# Patient Record
Sex: MALE | Race: WHITE | NOT HISPANIC OR LATINO | Employment: FULL TIME | URBAN - METROPOLITAN AREA
[De-identification: names, ages, dates, MRNs, and addresses within clinical notes are randomized per-mention and may not be internally consistent; named-entity substitution may affect disease eponyms.]

---

## 2017-01-31 ENCOUNTER — OFFICE VISIT - HEALTHEAST (OUTPATIENT)
Dept: FAMILY MEDICINE | Facility: CLINIC | Age: 37
End: 2017-01-31

## 2017-01-31 DIAGNOSIS — J32.9 SINUSITIS: ICD-10-CM

## 2017-01-31 DIAGNOSIS — H66.91 ROM (RIGHT OTITIS MEDIA): ICD-10-CM

## 2017-02-21 ENCOUNTER — OFFICE VISIT - HEALTHEAST (OUTPATIENT)
Dept: FAMILY MEDICINE | Facility: CLINIC | Age: 37
End: 2017-02-21

## 2017-02-21 DIAGNOSIS — J11.1 INFLUENZA-LIKE SYNDROME: ICD-10-CM

## 2017-07-10 ENCOUNTER — OFFICE VISIT - HEALTHEAST (OUTPATIENT)
Dept: FAMILY MEDICINE | Facility: CLINIC | Age: 37
End: 2017-07-10

## 2017-07-10 DIAGNOSIS — I83.11: ICD-10-CM

## 2017-07-10 DIAGNOSIS — R60.9 CHRONIC EDEMA: ICD-10-CM

## 2017-08-21 ENCOUNTER — OFFICE VISIT - HEALTHEAST (OUTPATIENT)
Dept: FAMILY MEDICINE | Facility: CLINIC | Age: 37
End: 2017-08-21

## 2017-08-21 DIAGNOSIS — J32.9 SINUSITIS: ICD-10-CM

## 2017-08-23 ENCOUNTER — OFFICE VISIT - HEALTHEAST (OUTPATIENT)
Dept: VASCULAR SURGERY | Facility: CLINIC | Age: 37
End: 2017-08-23

## 2017-08-23 DIAGNOSIS — Q66.6 VALGUS DEFORMITY OF BOTH FEET: ICD-10-CM

## 2017-08-23 DIAGNOSIS — I87.303 VENOUS HYPERTENSION OF LOWER EXTREMITY, BILATERAL: ICD-10-CM

## 2017-08-23 DIAGNOSIS — E66.01 MORBID OBESITY WITH BMI OF 40.0-44.9, ADULT (H): ICD-10-CM

## 2017-08-23 DIAGNOSIS — M21.42 ACQUIRED BILATERAL FLAT FEET: ICD-10-CM

## 2017-08-23 DIAGNOSIS — M21.41 ACQUIRED BILATERAL FLAT FEET: ICD-10-CM

## 2017-08-23 ASSESSMENT — MIFFLIN-ST. JEOR: SCORE: 2474.82

## 2017-08-24 ENCOUNTER — COMMUNICATION - HEALTHEAST (OUTPATIENT)
Dept: SURGERY | Facility: CLINIC | Age: 37
End: 2017-08-24

## 2017-10-11 ENCOUNTER — COMMUNICATION - HEALTHEAST (OUTPATIENT)
Dept: SURGERY | Facility: CLINIC | Age: 37
End: 2017-10-11

## 2017-10-23 ENCOUNTER — OFFICE VISIT - HEALTHEAST (OUTPATIENT)
Dept: FAMILY MEDICINE | Facility: CLINIC | Age: 37
End: 2017-10-23

## 2017-10-23 DIAGNOSIS — H66.90 ACUTE OTITIS MEDIA: ICD-10-CM

## 2017-11-30 ENCOUNTER — OFFICE VISIT - HEALTHEAST (OUTPATIENT)
Dept: VASCULAR SURGERY | Facility: CLINIC | Age: 37
End: 2017-11-30

## 2017-11-30 DIAGNOSIS — M21.41 ACQUIRED BILATERAL FLAT FEET: ICD-10-CM

## 2017-11-30 DIAGNOSIS — I87.2 VENOUS INSUFFICIENCY OF BOTH LOWER EXTREMITIES: ICD-10-CM

## 2017-11-30 DIAGNOSIS — M21.42 ACQUIRED BILATERAL FLAT FEET: ICD-10-CM

## 2017-11-30 DIAGNOSIS — R60.9 EDEMA: ICD-10-CM

## 2017-11-30 DIAGNOSIS — R06.83 SNORING: ICD-10-CM

## 2017-11-30 DIAGNOSIS — E66.01 MORBID OBESITY WITH BMI OF 40.0-44.9, ADULT (H): ICD-10-CM

## 2017-11-30 DIAGNOSIS — I87.303 VENOUS HYPERTENSION OF LOWER EXTREMITY, BILATERAL: ICD-10-CM

## 2017-11-30 ASSESSMENT — MIFFLIN-ST. JEOR: SCORE: 2456.68

## 2017-12-01 ENCOUNTER — COMMUNICATION - HEALTHEAST (OUTPATIENT)
Dept: OTHER | Facility: CLINIC | Age: 37
End: 2017-12-01

## 2017-12-05 ENCOUNTER — COMMUNICATION - HEALTHEAST (OUTPATIENT)
Dept: SURGERY | Facility: CLINIC | Age: 37
End: 2017-12-05

## 2017-12-07 ENCOUNTER — COMMUNICATION - HEALTHEAST (OUTPATIENT)
Dept: OTHER | Facility: CLINIC | Age: 37
End: 2017-12-07

## 2018-01-12 ENCOUNTER — AMBULATORY - HEALTHEAST (OUTPATIENT)
Dept: LAB | Facility: CLINIC | Age: 38
End: 2018-01-12

## 2018-01-12 ENCOUNTER — OFFICE VISIT - HEALTHEAST (OUTPATIENT)
Dept: SURGERY | Facility: CLINIC | Age: 38
End: 2018-01-12

## 2018-01-12 DIAGNOSIS — E66.01 OBESITY, CLASS III, BMI 40-49.9 (MORBID OBESITY) (H): ICD-10-CM

## 2018-01-12 LAB
ALBUMIN SERPL-MCNC: 4.1 G/DL (ref 3.5–5)
ALP SERPL-CCNC: 80 U/L (ref 45–120)
ALT SERPL W P-5'-P-CCNC: 66 U/L (ref 0–45)
ANION GAP SERPL CALCULATED.3IONS-SCNC: 12 MMOL/L (ref 5–18)
AST SERPL W P-5'-P-CCNC: 33 U/L (ref 0–40)
BILIRUB SERPL-MCNC: 0.6 MG/DL (ref 0–1)
BUN SERPL-MCNC: 20 MG/DL (ref 8–22)
CALCIUM SERPL-MCNC: 9.7 MG/DL (ref 8.5–10.5)
CHLORIDE BLD-SCNC: 108 MMOL/L (ref 98–107)
CHOLEST SERPL-MCNC: 187 MG/DL
CO2 SERPL-SCNC: 23 MMOL/L (ref 22–31)
CREAT SERPL-MCNC: 0.84 MG/DL (ref 0.7–1.3)
ERYTHROCYTE [DISTWIDTH] IN BLOOD BY AUTOMATED COUNT: 12.1 % (ref 11–14.5)
FASTING STATUS PATIENT QL REPORTED: ABNORMAL
FERRITIN SERPL-MCNC: 236 NG/ML (ref 27–300)
FOLATE SERPL-MCNC: 13.5 NG/ML
GFR SERPL CREATININE-BSD FRML MDRD: >60 ML/MIN/1.73M2
GLUCOSE BLD-MCNC: 98 MG/DL (ref 70–125)
HBA1C MFR BLD: 5.7 % (ref 3.5–6)
HCT VFR BLD AUTO: 45.1 % (ref 40–54)
HDLC SERPL-MCNC: 33 MG/DL
HGB BLD-MCNC: 15.3 G/DL (ref 14–18)
LDLC SERPL CALC-MCNC: 101 MG/DL
MCH RBC QN AUTO: 30.3 PG (ref 27–34)
MCHC RBC AUTO-ENTMCNC: 34 G/DL (ref 32–36)
MCV RBC AUTO: 89 FL (ref 80–100)
PLATELET # BLD AUTO: 266 THOU/UL (ref 140–440)
PMV BLD AUTO: 7.3 FL (ref 7–10)
POTASSIUM BLD-SCNC: 4.2 MMOL/L (ref 3.5–5)
PROT SERPL-MCNC: 7.6 G/DL (ref 6–8)
PTH-INTACT SERPL-MCNC: 37 PG/ML (ref 10–86)
RBC # BLD AUTO: 5.05 MILL/UL (ref 4.4–6.2)
SODIUM SERPL-SCNC: 143 MMOL/L (ref 136–145)
TRIGL SERPL-MCNC: 263 MG/DL
TSH SERPL DL<=0.005 MIU/L-ACNC: 1.77 UIU/ML (ref 0.3–5)
VIT B12 SERPL-MCNC: 505 PG/ML (ref 213–816)
WBC: 9.4 THOU/UL (ref 4–11)

## 2018-01-12 ASSESSMENT — MIFFLIN-ST. JEOR: SCORE: 2497.04

## 2018-01-15 ENCOUNTER — AMBULATORY - HEALTHEAST (OUTPATIENT)
Dept: SURGERY | Facility: CLINIC | Age: 38
End: 2018-01-15

## 2018-01-15 LAB
25(OH)D3 SERPL-MCNC: 24.2 NG/ML (ref 30–80)
ANNOTATION COMMENT IMP: NORMAL
VIT A SERPL-MCNC: 0.7 MG/L (ref 0.3–1.2)
VITAMIN A (RETINYL PALMITATE): 0.06 MG/L (ref 0–0.1)
ZINC SERPL-MCNC: 92 UG/DL (ref 60–120)

## 2018-01-17 ENCOUNTER — COMMUNICATION - HEALTHEAST (OUTPATIENT)
Dept: SURGERY | Facility: CLINIC | Age: 38
End: 2018-01-17

## 2018-01-17 LAB
TESTOST SERPL-MCNC: 145 NG/DL (ref 240–950)
TESTOSTERONE, FREE, S - HISTORICAL: 5.51 NG/DL (ref 4.65–18.1)
VIT B1 PYROPHOSHATE BLD-SCNC: 159 NMOL/L (ref 70–180)

## 2018-01-19 ENCOUNTER — OFFICE VISIT - HEALTHEAST (OUTPATIENT)
Dept: SLEEP MEDICINE | Facility: CLINIC | Age: 38
End: 2018-01-19

## 2018-01-19 DIAGNOSIS — R06.83 SNORING: ICD-10-CM

## 2018-01-19 DIAGNOSIS — R29.818 SUSPECTED SLEEP APNEA: ICD-10-CM

## 2018-01-19 DIAGNOSIS — G47.8 SLEEP DYSFUNCTION WITH SLEEP STAGE DISTURBANCE: ICD-10-CM

## 2018-01-19 DIAGNOSIS — E66.9 OBESITY: ICD-10-CM

## 2018-01-19 ASSESSMENT — MIFFLIN-ST. JEOR: SCORE: 2465.75

## 2018-02-23 ENCOUNTER — RECORDS - HEALTHEAST (OUTPATIENT)
Dept: SLEEP MEDICINE | Facility: CLINIC | Age: 38
End: 2018-02-23

## 2018-02-23 ENCOUNTER — RECORDS - HEALTHEAST (OUTPATIENT)
Dept: ADMINISTRATIVE | Facility: OTHER | Age: 38
End: 2018-02-23

## 2018-02-23 DIAGNOSIS — G47.8 OTHER SLEEP DISORDERS: ICD-10-CM

## 2018-02-23 DIAGNOSIS — R06.83 SNORING: ICD-10-CM

## 2018-02-23 DIAGNOSIS — G47.30 SLEEP APNEA, UNSPECIFIED: ICD-10-CM

## 2018-02-28 ENCOUNTER — OFFICE VISIT - HEALTHEAST (OUTPATIENT)
Dept: FAMILY MEDICINE | Facility: CLINIC | Age: 38
End: 2018-02-28

## 2018-02-28 DIAGNOSIS — J11.1 INFLUENZA-LIKE ILLNESS: ICD-10-CM

## 2018-02-28 DIAGNOSIS — R05.9 COUGH: ICD-10-CM

## 2018-02-28 DIAGNOSIS — R50.9 FEVER: ICD-10-CM

## 2018-02-28 DIAGNOSIS — J02.9 SORE THROAT: ICD-10-CM

## 2018-02-28 LAB
DEPRECATED S PYO AG THROAT QL EIA: NORMAL
FLUAV AG SPEC QL IA: NORMAL
FLUBV AG SPEC QL IA: NORMAL

## 2018-03-01 LAB — GROUP A STREP BY PCR: NORMAL

## 2018-03-02 ENCOUNTER — AMBULATORY - HEALTHEAST (OUTPATIENT)
Dept: SURGERY | Facility: CLINIC | Age: 38
End: 2018-03-02

## 2018-03-06 ENCOUNTER — AMBULATORY - HEALTHEAST (OUTPATIENT)
Dept: SLEEP MEDICINE | Facility: CLINIC | Age: 38
End: 2018-03-06

## 2018-03-06 ENCOUNTER — COMMUNICATION - HEALTHEAST (OUTPATIENT)
Dept: SLEEP MEDICINE | Facility: CLINIC | Age: 38
End: 2018-03-06

## 2018-03-06 DIAGNOSIS — G47.10 HYPERSOMNIA: ICD-10-CM

## 2018-03-06 DIAGNOSIS — G47.33 OBSTRUCTIVE SLEEP APNEA: ICD-10-CM

## 2018-03-09 ENCOUNTER — AMBULATORY - HEALTHEAST (OUTPATIENT)
Dept: SLEEP MEDICINE | Facility: CLINIC | Age: 38
End: 2018-03-09

## 2018-03-12 ENCOUNTER — OFFICE VISIT - HEALTHEAST (OUTPATIENT)
Dept: SURGERY | Facility: CLINIC | Age: 38
End: 2018-03-12

## 2018-03-12 ENCOUNTER — OFFICE VISIT - HEALTHEAST (OUTPATIENT)
Dept: VASCULAR SURGERY | Facility: CLINIC | Age: 38
End: 2018-03-12

## 2018-03-12 DIAGNOSIS — I87.303 VENOUS HYPERTENSION OF LOWER EXTREMITY, BILATERAL: ICD-10-CM

## 2018-03-12 DIAGNOSIS — Q66.6 VALGUS DEFORMITY OF BOTH FEET: ICD-10-CM

## 2018-03-12 DIAGNOSIS — E66.01 OBESITY, MORBID, BMI 40.0-49.9 (H): ICD-10-CM

## 2018-03-12 DIAGNOSIS — Z71.3 DIETARY COUNSELING: ICD-10-CM

## 2018-03-12 DIAGNOSIS — E66.01 MORBID OBESITY WITH BMI OF 40.0-44.9, ADULT (H): ICD-10-CM

## 2018-03-12 DIAGNOSIS — I87.2 VENOUS INSUFFICIENCY OF BOTH LOWER EXTREMITIES: ICD-10-CM

## 2018-03-12 DIAGNOSIS — M21.41 ACQUIRED BILATERAL FLAT FEET: ICD-10-CM

## 2018-03-12 DIAGNOSIS — M21.42 ACQUIRED BILATERAL FLAT FEET: ICD-10-CM

## 2018-03-12 DIAGNOSIS — E66.01 MORBID OBESITY (H): ICD-10-CM

## 2018-03-12 ASSESSMENT — MIFFLIN-ST. JEOR: SCORE: 2382.74

## 2018-03-14 ENCOUNTER — COMMUNICATION - HEALTHEAST (OUTPATIENT)
Dept: SURGERY | Facility: CLINIC | Age: 38
End: 2018-03-14

## 2018-04-23 ENCOUNTER — OFFICE VISIT - HEALTHEAST (OUTPATIENT)
Dept: SLEEP MEDICINE | Facility: CLINIC | Age: 38
End: 2018-04-23

## 2018-04-23 ENCOUNTER — OFFICE VISIT - HEALTHEAST (OUTPATIENT)
Dept: SURGERY | Facility: CLINIC | Age: 38
End: 2018-04-23

## 2018-04-23 DIAGNOSIS — Z71.3 DIETARY COUNSELING: ICD-10-CM

## 2018-04-23 DIAGNOSIS — G47.8 SLEEP DYSFUNCTION WITH SLEEP STAGE DISTURBANCE: ICD-10-CM

## 2018-04-23 DIAGNOSIS — G47.33 OBSTRUCTIVE SLEEP APNEA: ICD-10-CM

## 2018-04-23 DIAGNOSIS — E66.01 MORBID OBESITY (H): ICD-10-CM

## 2018-04-23 DIAGNOSIS — G47.33 OSA ON CPAP: ICD-10-CM

## 2018-04-23 DIAGNOSIS — E66.01 OBESITY, MORBID, BMI 40.0-49.9 (H): ICD-10-CM

## 2018-04-23 ASSESSMENT — MIFFLIN-ST. JEOR
SCORE: 2409.05
SCORE: 2396.35

## 2018-04-24 ENCOUNTER — AMBULATORY - HEALTHEAST (OUTPATIENT)
Dept: SURGERY | Facility: CLINIC | Age: 38
End: 2018-04-24

## 2018-05-07 ENCOUNTER — COMMUNICATION - HEALTHEAST (OUTPATIENT)
Dept: SURGERY | Facility: CLINIC | Age: 38
End: 2018-05-07

## 2018-05-23 ENCOUNTER — OFFICE VISIT - HEALTHEAST (OUTPATIENT)
Dept: SURGERY | Facility: CLINIC | Age: 38
End: 2018-05-23

## 2018-05-23 DIAGNOSIS — Z71.3 DIETARY COUNSELING: ICD-10-CM

## 2018-05-23 DIAGNOSIS — G47.33 OBSTRUCTIVE SLEEP APNEA ON CPAP: ICD-10-CM

## 2018-05-23 DIAGNOSIS — E66.01 OBESITY, MORBID, BMI 40.0-49.9 (H): ICD-10-CM

## 2018-05-23 ASSESSMENT — MIFFLIN-ST. JEOR: SCORE: 2364.59

## 2018-06-11 ENCOUNTER — OFFICE VISIT - HEALTHEAST (OUTPATIENT)
Dept: SURGERY | Facility: CLINIC | Age: 38
End: 2018-06-11

## 2018-06-11 DIAGNOSIS — E66.01 MORBID OBESITY (H): ICD-10-CM

## 2018-06-11 DIAGNOSIS — G47.33 OBSTRUCTIVE SLEEP APNEA: ICD-10-CM

## 2018-06-11 ASSESSMENT — MIFFLIN-ST. JEOR: SCORE: 2384.1

## 2018-06-14 ENCOUNTER — AMBULATORY - HEALTHEAST (OUTPATIENT)
Dept: SURGERY | Facility: CLINIC | Age: 38
End: 2018-06-14

## 2018-06-15 ENCOUNTER — COMMUNICATION - HEALTHEAST (OUTPATIENT)
Dept: SLEEP MEDICINE | Facility: CLINIC | Age: 38
End: 2018-06-15

## 2018-06-26 ENCOUNTER — OFFICE VISIT - HEALTHEAST (OUTPATIENT)
Dept: SLEEP MEDICINE | Facility: CLINIC | Age: 38
End: 2018-06-26

## 2018-06-26 DIAGNOSIS — G47.33 OSA ON CPAP: ICD-10-CM

## 2018-06-26 DIAGNOSIS — G47.8 SLEEP DYSFUNCTION WITH SLEEP STAGE DISTURBANCE: ICD-10-CM

## 2018-06-26 ASSESSMENT — MIFFLIN-ST. JEOR: SCORE: 2364.6

## 2018-06-29 ENCOUNTER — RECORDS - HEALTHEAST (OUTPATIENT)
Dept: ADMINISTRATIVE | Facility: OTHER | Age: 38
End: 2018-06-29

## 2018-07-03 ENCOUNTER — COMMUNICATION - HEALTHEAST (OUTPATIENT)
Dept: FAMILY MEDICINE | Facility: CLINIC | Age: 38
End: 2018-07-03

## 2018-07-05 ENCOUNTER — AMBULATORY - HEALTHEAST (OUTPATIENT)
Dept: SURGERY | Facility: CLINIC | Age: 38
End: 2018-07-05

## 2018-07-05 DIAGNOSIS — Z01.818 PRE-OP TESTING: ICD-10-CM

## 2018-07-09 ENCOUNTER — AMBULATORY - HEALTHEAST (OUTPATIENT)
Dept: SURGERY | Facility: CLINIC | Age: 38
End: 2018-07-09

## 2018-07-09 ENCOUNTER — OFFICE VISIT - HEALTHEAST (OUTPATIENT)
Dept: FAMILY MEDICINE | Facility: CLINIC | Age: 38
End: 2018-07-09

## 2018-07-09 DIAGNOSIS — E66.01 OBESITY, MORBID, BMI 40.0-49.9 (H): ICD-10-CM

## 2018-07-09 DIAGNOSIS — K21.9 ACID REFLUX: ICD-10-CM

## 2018-07-09 DIAGNOSIS — Z98.84 S/P BARIATRIC SURGERY: ICD-10-CM

## 2018-07-09 DIAGNOSIS — Z71.3 DIETARY COUNSELING: ICD-10-CM

## 2018-07-09 DIAGNOSIS — G47.33 OSA (OBSTRUCTIVE SLEEP APNEA): ICD-10-CM

## 2018-07-09 DIAGNOSIS — E66.01 MORBID OBESITY (H): ICD-10-CM

## 2018-07-09 DIAGNOSIS — Z01.818 PRE-OP TESTING: ICD-10-CM

## 2018-07-09 DIAGNOSIS — Z98.84 BARIATRIC SURGERY STATUS: ICD-10-CM

## 2018-07-09 LAB
ALBUMIN SERPL-MCNC: 4.3 G/DL (ref 3.5–5)
ALBUMIN UR-MCNC: NEGATIVE MG/DL
ALP SERPL-CCNC: 79 U/L (ref 45–120)
ALT SERPL W P-5'-P-CCNC: 40 U/L (ref 0–45)
AMORPH CRY #/AREA URNS HPF: ABNORMAL /[HPF]
ANION GAP SERPL CALCULATED.3IONS-SCNC: 8 MMOL/L (ref 5–18)
APPEARANCE UR: ABNORMAL
APTT PPP: 26 SECONDS (ref 24–37)
AST SERPL W P-5'-P-CCNC: 21 U/L (ref 0–40)
ATRIAL RATE - MUSE: 67 BPM
BACTERIA #/AREA URNS HPF: ABNORMAL HPF
BASOPHILS # BLD AUTO: 0.1 THOU/UL (ref 0–0.2)
BASOPHILS NFR BLD AUTO: 1 % (ref 0–2)
BILIRUB SERPL-MCNC: 0.9 MG/DL (ref 0–1)
BILIRUB UR QL STRIP: NEGATIVE
BUN SERPL-MCNC: 11 MG/DL (ref 8–22)
CALCIUM SERPL-MCNC: 9.8 MG/DL (ref 8.5–10.5)
CHLORIDE BLD-SCNC: 109 MMOL/L (ref 98–107)
CO2 SERPL-SCNC: 27 MMOL/L (ref 22–31)
COLOR UR AUTO: YELLOW
CREAT SERPL-MCNC: 0.9 MG/DL (ref 0.7–1.3)
DIASTOLIC BLOOD PRESSURE - MUSE: NORMAL MMHG
EOSINOPHIL # BLD AUTO: 0.3 THOU/UL (ref 0–0.4)
EOSINOPHIL NFR BLD AUTO: 2 % (ref 0–6)
ERYTHROCYTE [DISTWIDTH] IN BLOOD BY AUTOMATED COUNT: 12.2 % (ref 11–14.5)
GFR SERPL CREATININE-BSD FRML MDRD: >60 ML/MIN/1.73M2
GLUCOSE BLD-MCNC: 95 MG/DL (ref 70–125)
GLUCOSE UR STRIP-MCNC: NEGATIVE MG/DL
HCT VFR BLD AUTO: 48.5 % (ref 40–54)
HGB BLD-MCNC: 16.2 G/DL (ref 14–18)
HGB UR QL STRIP: NEGATIVE
INR PPP: 1.05 (ref 0.9–1.1)
INTERPRETATION ECG - MUSE: NORMAL
KETONES UR STRIP-MCNC: NEGATIVE MG/DL
LEUKOCYTE ESTERASE UR QL STRIP: NEGATIVE
LYMPHOCYTES # BLD AUTO: 2.9 THOU/UL (ref 0.8–4.4)
LYMPHOCYTES NFR BLD AUTO: 25 % (ref 20–40)
MCH RBC QN AUTO: 30.5 PG (ref 27–34)
MCHC RBC AUTO-ENTMCNC: 33.5 G/DL (ref 32–36)
MCV RBC AUTO: 91 FL (ref 80–100)
MONOCYTES # BLD AUTO: 0.8 THOU/UL (ref 0–0.9)
MONOCYTES NFR BLD AUTO: 7 % (ref 2–10)
NEUTROPHILS # BLD AUTO: 7.8 THOU/UL (ref 2–7.7)
NEUTROPHILS NFR BLD AUTO: 66 % (ref 50–70)
NITRATE UR QL: NEGATIVE
P AXIS - MUSE: 33 DEGREES
PH UR STRIP: 7.5 [PH] (ref 5–8)
PLATELET # BLD AUTO: 303 THOU/UL (ref 140–440)
PMV BLD AUTO: 7.2 FL (ref 7–10)
POTASSIUM BLD-SCNC: 4.1 MMOL/L (ref 3.5–5)
PR INTERVAL - MUSE: 150 MS
PROT SERPL-MCNC: 7.4 G/DL (ref 6–8)
QRS DURATION - MUSE: 102 MS
QT - MUSE: 404 MS
QTC - MUSE: 426 MS
R AXIS - MUSE: 33 DEGREES
RBC # BLD AUTO: 5.33 MILL/UL (ref 4.4–6.2)
RBC #/AREA URNS AUTO: ABNORMAL HPF
SODIUM SERPL-SCNC: 144 MMOL/L (ref 136–145)
SP GR UR STRIP: 1.01 (ref 1–1.03)
SQUAMOUS #/AREA URNS AUTO: ABNORMAL LPF
SYSTOLIC BLOOD PRESSURE - MUSE: NORMAL MMHG
T AXIS - MUSE: 34 DEGREES
UROBILINOGEN UR STRIP-ACNC: ABNORMAL
VENTRICULAR RATE- MUSE: 67 BPM
WBC #/AREA URNS AUTO: ABNORMAL HPF
WBC: 11.8 THOU/UL (ref 4–11)

## 2018-07-09 ASSESSMENT — MIFFLIN-ST. JEOR
SCORE: 2329.1
SCORE: 2330
SCORE: 2329.1

## 2018-07-12 ENCOUNTER — COMMUNICATION - HEALTHEAST (OUTPATIENT)
Dept: SURGERY | Facility: CLINIC | Age: 38
End: 2018-07-12

## 2018-07-16 ENCOUNTER — COMMUNICATION - HEALTHEAST (OUTPATIENT)
Dept: SURGERY | Facility: CLINIC | Age: 38
End: 2018-07-16

## 2018-08-10 ENCOUNTER — OFFICE VISIT - HEALTHEAST (OUTPATIENT)
Dept: SURGERY | Facility: CLINIC | Age: 38
End: 2018-08-10

## 2018-08-10 DIAGNOSIS — E66.01 OBESITY, CLASS III, BMI 40-49.9 (MORBID OBESITY) (H): ICD-10-CM

## 2018-08-10 DIAGNOSIS — G47.33 OSA ON CPAP: ICD-10-CM

## 2018-08-10 DIAGNOSIS — R63.4 WEIGHT LOSS OF MORE THAN 10% BODY WEIGHT: ICD-10-CM

## 2018-08-10 ASSESSMENT — MIFFLIN-ST. JEOR: SCORE: 2314.13

## 2018-08-23 ENCOUNTER — COMMUNICATION - HEALTHEAST (OUTPATIENT)
Dept: SURGERY | Facility: CLINIC | Age: 38
End: 2018-08-23

## 2018-09-10 ENCOUNTER — OFFICE VISIT - HEALTHEAST (OUTPATIENT)
Dept: SURGERY | Facility: CLINIC | Age: 38
End: 2018-09-10

## 2018-09-10 DIAGNOSIS — E66.01 OBESITY, MORBID, BMI 40.0-49.9 (H): ICD-10-CM

## 2018-09-10 DIAGNOSIS — Z71.3 DIETARY COUNSELING: ICD-10-CM

## 2018-09-10 ASSESSMENT — MIFFLIN-ST. JEOR: SCORE: 2315.49

## 2018-11-27 ENCOUNTER — OFFICE VISIT - HEALTHEAST (OUTPATIENT)
Dept: FAMILY MEDICINE | Facility: CLINIC | Age: 38
End: 2018-11-27

## 2018-11-27 DIAGNOSIS — R68.83 CHILLS (WITHOUT FEVER): ICD-10-CM

## 2018-11-27 DIAGNOSIS — B34.9 ACUTE VIRAL SYNDROME: ICD-10-CM

## 2018-11-27 DIAGNOSIS — R07.0 THROAT PAIN: ICD-10-CM

## 2018-11-27 LAB
DEPRECATED S PYO AG THROAT QL EIA: NORMAL
FLUAV AG SPEC QL IA: NORMAL
FLUBV AG SPEC QL IA: NORMAL

## 2018-11-28 LAB — GROUP A STREP BY PCR: NORMAL

## 2019-01-02 ENCOUNTER — OFFICE VISIT - HEALTHEAST (OUTPATIENT)
Dept: SLEEP MEDICINE | Facility: CLINIC | Age: 39
End: 2019-01-02

## 2019-01-02 DIAGNOSIS — G47.33 OBSTRUCTIVE SLEEP APNEA: ICD-10-CM

## 2019-01-02 DIAGNOSIS — G47.8 SLEEP DYSFUNCTION WITH SLEEP STAGE DISTURBANCE: ICD-10-CM

## 2019-01-02 ASSESSMENT — MIFFLIN-ST. JEOR: SCORE: 2407.12

## 2019-03-18 ENCOUNTER — COMMUNICATION - HEALTHEAST (OUTPATIENT)
Dept: SURGERY | Facility: CLINIC | Age: 39
End: 2019-03-18

## 2019-03-20 ENCOUNTER — AMBULATORY - HEALTHEAST (OUTPATIENT)
Dept: LAB | Facility: CLINIC | Age: 39
End: 2019-03-20

## 2019-03-20 ENCOUNTER — OFFICE VISIT - HEALTHEAST (OUTPATIENT)
Dept: SURGERY | Facility: CLINIC | Age: 39
End: 2019-03-20

## 2019-03-20 DIAGNOSIS — E66.01 OBESITY, CLASS III, BMI 40-49.9 (MORBID OBESITY) (H): ICD-10-CM

## 2019-03-20 DIAGNOSIS — E66.01 MORBID OBESITY (H): ICD-10-CM

## 2019-03-20 DIAGNOSIS — G47.33 OSA ON CPAP: ICD-10-CM

## 2019-03-20 DIAGNOSIS — K76.0 FATTY LIVER DISEASE, NONALCOHOLIC: ICD-10-CM

## 2019-03-20 DIAGNOSIS — G47.33 OBSTRUCTIVE SLEEP APNEA: ICD-10-CM

## 2019-03-20 LAB
ALBUMIN SERPL-MCNC: 4.4 G/DL (ref 3.5–5)
ALP SERPL-CCNC: 68 U/L (ref 45–120)
ALT SERPL W P-5'-P-CCNC: 52 U/L (ref 0–45)
ANION GAP SERPL CALCULATED.3IONS-SCNC: 11 MMOL/L (ref 5–18)
AST SERPL W P-5'-P-CCNC: 27 U/L (ref 0–40)
BILIRUB SERPL-MCNC: 0.8 MG/DL (ref 0–1)
BUN SERPL-MCNC: 17 MG/DL (ref 8–22)
CALCIUM SERPL-MCNC: 9.7 MG/DL (ref 8.5–10.5)
CHLORIDE BLD-SCNC: 105 MMOL/L (ref 98–107)
CHOLEST SERPL-MCNC: 193 MG/DL
CO2 SERPL-SCNC: 26 MMOL/L (ref 22–31)
CREAT SERPL-MCNC: 0.9 MG/DL (ref 0.7–1.3)
ERYTHROCYTE [DISTWIDTH] IN BLOOD BY AUTOMATED COUNT: 11.7 % (ref 11–14.5)
FASTING STATUS PATIENT QL REPORTED: ABNORMAL
FERRITIN SERPL-MCNC: 171 NG/ML (ref 27–300)
FOLATE SERPL-MCNC: >20 NG/ML
GFR SERPL CREATININE-BSD FRML MDRD: >60 ML/MIN/1.73M2
GLUCOSE BLD-MCNC: 86 MG/DL (ref 70–125)
HBA1C MFR BLD: 5 % (ref 3.5–6)
HCT VFR BLD AUTO: 47 % (ref 40–54)
HDLC SERPL-MCNC: 33 MG/DL
HGB BLD-MCNC: 16.1 G/DL (ref 14–18)
LDLC SERPL CALC-MCNC: 99 MG/DL
MCH RBC QN AUTO: 30.9 PG (ref 27–34)
MCHC RBC AUTO-ENTMCNC: 34.2 G/DL (ref 32–36)
MCV RBC AUTO: 90 FL (ref 80–100)
PLATELET # BLD AUTO: 284 THOU/UL (ref 140–440)
PMV BLD AUTO: 7 FL (ref 7–10)
POTASSIUM BLD-SCNC: 4.3 MMOL/L (ref 3.5–5)
PROT SERPL-MCNC: 7.7 G/DL (ref 6–8)
PTH-INTACT SERPL-MCNC: 40 PG/ML (ref 10–86)
RBC # BLD AUTO: 5.2 MILL/UL (ref 4.4–6.2)
SODIUM SERPL-SCNC: 142 MMOL/L (ref 136–145)
TRIGL SERPL-MCNC: 303 MG/DL
TSH SERPL DL<=0.005 MIU/L-ACNC: 1.68 UIU/ML (ref 0.3–5)
VIT B12 SERPL-MCNC: 1530 PG/ML (ref 213–816)
WBC: 10.4 THOU/UL (ref 4–11)

## 2019-03-20 ASSESSMENT — MIFFLIN-ST. JEOR
SCORE: 2442.5
SCORE: 2442.5

## 2019-03-21 LAB — 25(OH)D3 SERPL-MCNC: 32.4 NG/ML (ref 30–80)

## 2019-03-22 LAB — ZINC SERPL-MCNC: 94 UG/DL (ref 60–120)

## 2019-03-23 LAB — VIT B1 PYROPHOSHATE BLD-SCNC: 216 NMOL/L (ref 70–180)

## 2019-03-24 LAB
ANNOTATION COMMENT IMP: ABNORMAL
VIT A SERPL-MCNC: 0.8 MG/L (ref 0.3–1.2)
VITAMIN A (RETINYL PALMITATE): 0.2 MG/L (ref 0–0.1)

## 2019-03-27 ENCOUNTER — OFFICE VISIT - HEALTHEAST (OUTPATIENT)
Dept: VASCULAR SURGERY | Facility: CLINIC | Age: 39
End: 2019-03-27

## 2019-03-27 ENCOUNTER — RECORDS - HEALTHEAST (OUTPATIENT)
Dept: ADMINISTRATIVE | Facility: OTHER | Age: 39
End: 2019-03-27

## 2019-03-27 DIAGNOSIS — Q66.6 VALGUS DEFORMITY OF BOTH FEET: ICD-10-CM

## 2019-03-27 DIAGNOSIS — I87.2 VENOUS INSUFFICIENCY OF BOTH LOWER EXTREMITIES: ICD-10-CM

## 2019-03-27 DIAGNOSIS — M21.41 ACQUIRED BILATERAL FLAT FEET: ICD-10-CM

## 2019-03-27 DIAGNOSIS — M21.42 ACQUIRED BILATERAL FLAT FEET: ICD-10-CM

## 2019-03-27 DIAGNOSIS — I87.303 VENOUS HYPERTENSION OF LOWER EXTREMITY, BILATERAL: ICD-10-CM

## 2019-03-27 DIAGNOSIS — E66.01 MORBID OBESITY WITH BMI OF 40.0-44.9, ADULT (H): ICD-10-CM

## 2019-03-27 ASSESSMENT — MIFFLIN-ST. JEOR: SCORE: 2434

## 2019-04-02 ENCOUNTER — COMMUNICATION - HEALTHEAST (OUTPATIENT)
Dept: SURGERY | Facility: CLINIC | Age: 39
End: 2019-04-02

## 2019-04-04 ENCOUNTER — AMBULATORY - HEALTHEAST (OUTPATIENT)
Dept: SURGERY | Facility: CLINIC | Age: 39
End: 2019-04-04

## 2019-04-08 ENCOUNTER — COMMUNICATION - HEALTHEAST (OUTPATIENT)
Dept: SURGERY | Facility: CLINIC | Age: 39
End: 2019-04-08

## 2019-04-08 ENCOUNTER — COMMUNICATION - HEALTHEAST (OUTPATIENT)
Dept: FAMILY MEDICINE | Facility: CLINIC | Age: 39
End: 2019-04-08

## 2019-04-17 ENCOUNTER — OFFICE VISIT - HEALTHEAST (OUTPATIENT)
Dept: FAMILY MEDICINE | Facility: CLINIC | Age: 39
End: 2019-04-17

## 2019-04-17 DIAGNOSIS — G47.33 OSA (OBSTRUCTIVE SLEEP APNEA): ICD-10-CM

## 2019-04-17 DIAGNOSIS — E66.01 MORBID OBESITY WITH BMI OF 40.0-44.9, ADULT (H): ICD-10-CM

## 2019-04-17 DIAGNOSIS — J30.1 SEASONAL ALLERGIC RHINITIS DUE TO POLLEN: ICD-10-CM

## 2019-04-17 ASSESSMENT — MIFFLIN-ST. JEOR: SCORE: 2406.78

## 2019-04-22 ENCOUNTER — AMBULATORY - HEALTHEAST (OUTPATIENT)
Dept: SURGERY | Facility: CLINIC | Age: 39
End: 2019-04-22

## 2019-04-22 DIAGNOSIS — K21.9 ACID REFLUX: ICD-10-CM

## 2019-04-22 DIAGNOSIS — Z01.818 PRE-OPERATIVE CLEARANCE: ICD-10-CM

## 2019-04-22 DIAGNOSIS — Z98.84 STATUS POST LAPAROSCOPIC SLEEVE GASTRECTOMY: ICD-10-CM

## 2019-05-01 ENCOUNTER — RECORDS - HEALTHEAST (OUTPATIENT)
Dept: GENERAL RADIOLOGY | Facility: CLINIC | Age: 39
End: 2019-05-01

## 2019-05-01 ENCOUNTER — OFFICE VISIT - HEALTHEAST (OUTPATIENT)
Dept: FAMILY MEDICINE | Facility: CLINIC | Age: 39
End: 2019-05-01

## 2019-05-01 DIAGNOSIS — G47.33 OBSTRUCTIVE SLEEP APNEA SYNDROME: ICD-10-CM

## 2019-05-01 DIAGNOSIS — Z01.818 PRE-OPERATIVE CLEARANCE: ICD-10-CM

## 2019-05-01 DIAGNOSIS — Z01.818 ENCOUNTER FOR OTHER PREPROCEDURAL EXAMINATION: ICD-10-CM

## 2019-05-01 DIAGNOSIS — E66.01 MORBID OBESITY WITH BMI OF 40.0-44.9, ADULT (H): ICD-10-CM

## 2019-05-01 LAB
ALBUMIN UR-MCNC: ABNORMAL MG/DL
APPEARANCE UR: CLEAR
BACTERIA #/AREA URNS HPF: ABNORMAL HPF
BILIRUB UR QL STRIP: NEGATIVE
COLOR UR AUTO: YELLOW
GLUCOSE UR STRIP-MCNC: NEGATIVE MG/DL
HGB UR QL STRIP: NEGATIVE
KETONES UR STRIP-MCNC: ABNORMAL MG/DL
LEUKOCYTE ESTERASE UR QL STRIP: NEGATIVE
MUCOUS THREADS #/AREA URNS LPF: ABNORMAL LPF
NITRATE UR QL: NEGATIVE
PH UR STRIP: 5.5 [PH] (ref 5–8)
RBC #/AREA URNS AUTO: ABNORMAL HPF
SP GR UR STRIP: 1.02 (ref 1–1.03)
SQUAMOUS #/AREA URNS AUTO: ABNORMAL LPF
UROBILINOGEN UR STRIP-ACNC: ABNORMAL
WBC #/AREA URNS AUTO: ABNORMAL HPF

## 2019-05-01 ASSESSMENT — MIFFLIN-ST. JEOR: SCORE: 2367.99

## 2019-05-02 LAB
ALBUMIN SERPL-MCNC: 4.7 G/DL (ref 3.5–5)
ALP SERPL-CCNC: 69 U/L (ref 45–120)
ALT SERPL W P-5'-P-CCNC: 51 U/L (ref 0–45)
ANION GAP SERPL CALCULATED.3IONS-SCNC: 13 MMOL/L (ref 5–18)
APTT PPP: 29 SECONDS (ref 24–37)
AST SERPL W P-5'-P-CCNC: 33 U/L (ref 0–40)
ATRIAL RATE - MUSE: 61 BPM
BASOPHILS # BLD AUTO: 0.1 THOU/UL (ref 0–0.2)
BASOPHILS NFR BLD AUTO: 1 % (ref 0–2)
BILIRUB SERPL-MCNC: 1.1 MG/DL (ref 0–1)
BUN SERPL-MCNC: 18 MG/DL (ref 8–22)
CALCIUM SERPL-MCNC: 9.9 MG/DL (ref 8.5–10.5)
CHLORIDE BLD-SCNC: 107 MMOL/L (ref 98–107)
CO2 SERPL-SCNC: 22 MMOL/L (ref 22–31)
CREAT SERPL-MCNC: 0.97 MG/DL (ref 0.7–1.3)
DIASTOLIC BLOOD PRESSURE - MUSE: NORMAL MMHG
EOSINOPHIL # BLD AUTO: 0.3 THOU/UL (ref 0–0.4)
EOSINOPHIL NFR BLD AUTO: 3 % (ref 0–6)
ERYTHROCYTE [DISTWIDTH] IN BLOOD BY AUTOMATED COUNT: 12.6 % (ref 11–14.5)
GFR SERPL CREATININE-BSD FRML MDRD: >60 ML/MIN/1.73M2
GLUCOSE BLD-MCNC: 85 MG/DL (ref 70–125)
HCT VFR BLD AUTO: 46.2 % (ref 40–54)
HGB BLD-MCNC: 15.8 G/DL (ref 14–18)
INR PPP: 1.07 (ref 0.9–1.1)
INTERPRETATION ECG - MUSE: NORMAL
LYMPHOCYTES # BLD AUTO: 2.6 THOU/UL (ref 0.8–4.4)
LYMPHOCYTES NFR BLD AUTO: 26 % (ref 20–40)
MCH RBC QN AUTO: 29.9 PG (ref 27–34)
MCHC RBC AUTO-ENTMCNC: 34.2 G/DL (ref 32–36)
MCV RBC AUTO: 87 FL (ref 80–100)
MONOCYTES # BLD AUTO: 0.7 THOU/UL (ref 0–0.9)
MONOCYTES NFR BLD AUTO: 7 % (ref 2–10)
NEUTROPHILS # BLD AUTO: 6.4 THOU/UL (ref 2–7.7)
NEUTROPHILS NFR BLD AUTO: 64 % (ref 50–70)
P AXIS - MUSE: 8 DEGREES
PLATELET # BLD AUTO: NORMAL THOU/UL (ref 140–440)
PMV BLD AUTO: NORMAL FL (ref 8.5–12.5)
POTASSIUM BLD-SCNC: 4.3 MMOL/L (ref 3.5–5)
PR INTERVAL - MUSE: 150 MS
PROT SERPL-MCNC: 7.8 G/DL (ref 6–8)
QRS DURATION - MUSE: 106 MS
QT - MUSE: 428 MS
QTC - MUSE: 430 MS
R AXIS - MUSE: 17 DEGREES
RBC # BLD AUTO: 5.29 MILL/UL (ref 4.4–6.2)
SODIUM SERPL-SCNC: 142 MMOL/L (ref 136–145)
SYSTOLIC BLOOD PRESSURE - MUSE: NORMAL MMHG
T AXIS - MUSE: 44 DEGREES
VENTRICULAR RATE- MUSE: 61 BPM
WBC: 10.1 THOU/UL (ref 4–11)

## 2019-05-03 ENCOUNTER — COMMUNICATION - HEALTHEAST (OUTPATIENT)
Dept: FAMILY MEDICINE | Facility: CLINIC | Age: 39
End: 2019-05-03

## 2019-05-07 ENCOUNTER — COMMUNICATION - HEALTHEAST (OUTPATIENT)
Dept: SURGERY | Facility: CLINIC | Age: 39
End: 2019-05-07

## 2019-05-10 ENCOUNTER — COMMUNICATION - HEALTHEAST (OUTPATIENT)
Dept: SURGERY | Facility: CLINIC | Age: 39
End: 2019-05-10

## 2019-05-11 ENCOUNTER — ANESTHESIA - HEALTHEAST (OUTPATIENT)
Dept: SURGERY | Facility: CLINIC | Age: 39
End: 2019-05-11

## 2019-05-13 ENCOUNTER — SURGERY - HEALTHEAST (OUTPATIENT)
Dept: SURGERY | Facility: CLINIC | Age: 39
End: 2019-05-13

## 2019-05-13 ASSESSMENT — MIFFLIN-ST. JEOR
SCORE: 2215.13
SCORE: 2212.58
SCORE: 2215.13

## 2019-05-16 ENCOUNTER — COMMUNICATION - HEALTHEAST (OUTPATIENT)
Dept: SURGERY | Facility: CLINIC | Age: 39
End: 2019-05-16

## 2019-05-17 ENCOUNTER — COMMUNICATION - HEALTHEAST (OUTPATIENT)
Dept: SURGERY | Facility: CLINIC | Age: 39
End: 2019-05-17

## 2019-05-20 ENCOUNTER — COMMUNICATION - HEALTHEAST (OUTPATIENT)
Dept: SURGERY | Facility: CLINIC | Age: 39
End: 2019-05-20

## 2019-05-21 ENCOUNTER — COMMUNICATION - HEALTHEAST (OUTPATIENT)
Dept: SURGERY | Facility: CLINIC | Age: 39
End: 2019-05-21

## 2019-05-22 ENCOUNTER — COMMUNICATION - HEALTHEAST (OUTPATIENT)
Dept: SURGERY | Facility: CLINIC | Age: 39
End: 2019-05-22

## 2019-05-22 ENCOUNTER — AMBULATORY - HEALTHEAST (OUTPATIENT)
Dept: SURGERY | Facility: CLINIC | Age: 39
End: 2019-05-22

## 2019-05-22 DIAGNOSIS — Z71.3 DIETARY COUNSELING: ICD-10-CM

## 2019-05-22 DIAGNOSIS — Z98.84 STATUS POST LAPAROSCOPIC SLEEVE GASTRECTOMY: ICD-10-CM

## 2019-05-22 DIAGNOSIS — E66.9 OBESITY (BMI 30-39.9): ICD-10-CM

## 2019-05-29 ENCOUNTER — OFFICE VISIT - HEALTHEAST (OUTPATIENT)
Dept: SURGERY | Facility: CLINIC | Age: 39
End: 2019-05-29

## 2019-05-29 DIAGNOSIS — E66.01 MORBID OBESITY (H): ICD-10-CM

## 2019-05-29 ASSESSMENT — MIFFLIN-ST. JEOR: SCORE: 2224.2

## 2019-06-05 ENCOUNTER — COMMUNICATION - HEALTHEAST (OUTPATIENT)
Dept: SURGERY | Facility: CLINIC | Age: 39
End: 2019-06-05

## 2019-06-20 ENCOUNTER — AMBULATORY - HEALTHEAST (OUTPATIENT)
Dept: SURGERY | Facility: CLINIC | Age: 39
End: 2019-06-20

## 2019-06-20 DIAGNOSIS — Z98.84 BARIATRIC SURGERY STATUS: ICD-10-CM

## 2019-06-20 DIAGNOSIS — Z71.3 NUTRITIONAL COUNSELING: ICD-10-CM

## 2019-06-20 DIAGNOSIS — E66.9 OBESITY WITH BODY MASS INDEX OF 30.0-39.9: ICD-10-CM

## 2019-07-19 ENCOUNTER — COMMUNICATION - HEALTHEAST (OUTPATIENT)
Dept: SURGERY | Facility: CLINIC | Age: 39
End: 2019-07-19

## 2019-08-12 ENCOUNTER — COMMUNICATION - HEALTHEAST (OUTPATIENT)
Dept: SURGERY | Facility: CLINIC | Age: 39
End: 2019-08-12

## 2019-08-12 ENCOUNTER — OFFICE VISIT - HEALTHEAST (OUTPATIENT)
Dept: SURGERY | Facility: CLINIC | Age: 39
End: 2019-08-12

## 2019-08-12 DIAGNOSIS — E66.9 OBESITY (BMI 30-39.9): ICD-10-CM

## 2019-08-12 DIAGNOSIS — Z98.84 S/P BARIATRIC SURGERY: ICD-10-CM

## 2019-08-12 DIAGNOSIS — Z71.3 DIETARY COUNSELING: ICD-10-CM

## 2019-08-12 ASSESSMENT — MIFFLIN-ST. JEOR: SCORE: 2006.48

## 2019-08-13 ENCOUNTER — COMMUNICATION - HEALTHEAST (OUTPATIENT)
Dept: SURGERY | Facility: CLINIC | Age: 39
End: 2019-08-13

## 2019-08-15 ENCOUNTER — COMMUNICATION - HEALTHEAST (OUTPATIENT)
Dept: SURGERY | Facility: CLINIC | Age: 39
End: 2019-08-15

## 2019-11-13 ENCOUNTER — COMMUNICATION - HEALTHEAST (OUTPATIENT)
Dept: SURGERY | Facility: CLINIC | Age: 39
End: 2019-11-13

## 2019-11-13 ENCOUNTER — AMBULATORY - HEALTHEAST (OUTPATIENT)
Dept: LAB | Facility: CLINIC | Age: 39
End: 2019-11-13

## 2019-11-13 ENCOUNTER — OFFICE VISIT - HEALTHEAST (OUTPATIENT)
Dept: SURGERY | Facility: CLINIC | Age: 39
End: 2019-11-13

## 2019-11-13 DIAGNOSIS — K91.2 POSTOPERATIVE MALABSORPTION: ICD-10-CM

## 2019-11-13 DIAGNOSIS — Z90.3 HISTORY OF SLEEVE GASTRECTOMY: ICD-10-CM

## 2019-11-13 DIAGNOSIS — Z86.69 HISTORY OF SLEEP APNEA: ICD-10-CM

## 2019-11-13 LAB
ALBUMIN SERPL-MCNC: 4.2 G/DL (ref 3.5–5)
ALP SERPL-CCNC: 80 U/L (ref 45–120)
ALT SERPL W P-5'-P-CCNC: 17 U/L (ref 0–45)
ANION GAP SERPL CALCULATED.3IONS-SCNC: 8 MMOL/L (ref 5–18)
AST SERPL W P-5'-P-CCNC: 17 U/L (ref 0–40)
BILIRUB SERPL-MCNC: 1.2 MG/DL (ref 0–1)
BUN SERPL-MCNC: 17 MG/DL (ref 8–22)
CALCIUM SERPL-MCNC: 9.5 MG/DL (ref 8.5–10.5)
CHLORIDE BLD-SCNC: 108 MMOL/L (ref 98–107)
CO2 SERPL-SCNC: 27 MMOL/L (ref 22–31)
CREAT SERPL-MCNC: 0.78 MG/DL (ref 0.7–1.3)
ERYTHROCYTE [DISTWIDTH] IN BLOOD BY AUTOMATED COUNT: 11.1 % (ref 11–14.5)
FERRITIN SERPL-MCNC: 155 NG/ML (ref 27–300)
GFR SERPL CREATININE-BSD FRML MDRD: >60 ML/MIN/1.73M2
GLUCOSE BLD-MCNC: 93 MG/DL (ref 70–125)
HBA1C MFR BLD: 4.7 % (ref 3.5–6)
HCT VFR BLD AUTO: 43.3 % (ref 40–54)
HGB BLD-MCNC: 14.6 G/DL (ref 14–18)
MAGNESIUM SERPL-MCNC: 2 MG/DL (ref 1.8–2.6)
MCH RBC QN AUTO: 31.5 PG (ref 27–34)
MCHC RBC AUTO-ENTMCNC: 33.6 G/DL (ref 32–36)
MCV RBC AUTO: 94 FL (ref 80–100)
PLATELET # BLD AUTO: 260 THOU/UL (ref 140–440)
PMV BLD AUTO: 7 FL (ref 7–10)
POTASSIUM BLD-SCNC: 5.2 MMOL/L (ref 3.5–5)
PROT SERPL-MCNC: 6.7 G/DL (ref 6–8)
PTH-INTACT SERPL-MCNC: 55 PG/ML (ref 10–86)
RBC # BLD AUTO: 4.63 MILL/UL (ref 4.4–6.2)
SODIUM SERPL-SCNC: 143 MMOL/L (ref 136–145)
VIT B12 SERPL-MCNC: >2000 PG/ML (ref 213–816)
WBC: 5.7 THOU/UL (ref 4–11)

## 2019-11-13 RX ORDER — IBUPROFEN 200 MG
1 CAPSULE ORAL DAILY
Status: SHIPPED | COMMUNITY
Start: 2019-11-13 | End: 2023-05-16

## 2019-11-13 ASSESSMENT — MIFFLIN-ST. JEOR: SCORE: 1910.31

## 2019-11-14 LAB — 25(OH)D3 SERPL-MCNC: 73.3 NG/ML (ref 30–80)

## 2019-11-16 LAB
ANNOTATION COMMENT IMP: NORMAL
COPPER SERPL-MCNC: 98 UG/DL (ref 70–140)
VIT A SERPL-MCNC: 0.46 MG/L (ref 0.3–1.2)
VIT B1 PYROPHOSHATE BLD-SCNC: 166 NMOL/L (ref 70–180)
VITAMIN A (RETINYL PALMITATE): 0.04 MG/L (ref 0–0.1)
ZINC SERPL-MCNC: 77.1 UG/DL (ref 60–120)

## 2019-11-18 ENCOUNTER — COMMUNICATION - HEALTHEAST (OUTPATIENT)
Dept: SURGERY | Facility: CLINIC | Age: 39
End: 2019-11-18

## 2019-12-11 ENCOUNTER — COMMUNICATION - HEALTHEAST (OUTPATIENT)
Dept: OTHER | Facility: CLINIC | Age: 39
End: 2019-12-11

## 2019-12-11 ENCOUNTER — OFFICE VISIT - HEALTHEAST (OUTPATIENT)
Dept: VASCULAR SURGERY | Facility: CLINIC | Age: 39
End: 2019-12-11

## 2019-12-11 DIAGNOSIS — I87.2 VENOUS INSUFFICIENCY OF BOTH LOWER EXTREMITIES: ICD-10-CM

## 2019-12-11 DIAGNOSIS — Q66.6 VALGUS DEFORMITY OF BOTH FEET: ICD-10-CM

## 2019-12-11 DIAGNOSIS — M21.41 FLAT FEET, BILATERAL: ICD-10-CM

## 2019-12-11 DIAGNOSIS — I87.303 VENOUS HYPERTENSION OF LOWER EXTREMITY, BILATERAL: ICD-10-CM

## 2019-12-11 DIAGNOSIS — M21.42 FLAT FEET, BILATERAL: ICD-10-CM

## 2019-12-11 ASSESSMENT — MIFFLIN-ST. JEOR: SCORE: 1893.08

## 2020-02-17 ENCOUNTER — COMMUNICATION - HEALTHEAST (OUTPATIENT)
Dept: SURGERY | Facility: CLINIC | Age: 40
End: 2020-02-17

## 2020-09-24 ENCOUNTER — AMBULATORY - HEALTHEAST (OUTPATIENT)
Dept: INTERNAL MEDICINE | Facility: CLINIC | Age: 40
End: 2020-09-24

## 2020-09-24 ENCOUNTER — VIRTUAL VISIT (OUTPATIENT)
Dept: FAMILY MEDICINE | Facility: OTHER | Age: 40
End: 2020-09-24
Payer: COMMERCIAL

## 2020-09-24 DIAGNOSIS — Z20.822 SUSPECTED 2019 NOVEL CORONAVIRUS INFECTION: ICD-10-CM

## 2020-09-24 PROCEDURE — 99421 OL DIG E/M SVC 5-10 MIN: CPT | Performed by: PHYSICIAN ASSISTANT

## 2020-09-24 NOTE — PROGRESS NOTES
"Date: 2020 10:35:22  Clinician: Lester Dyson  Clinician NPI: [id number]  Patient: Lawson Daniels  Patient : 1980  Patient Address: [address] [place] [address]  Patient Phone: [phone]  Visit Protocol: URI  Patient Summary:  Lawson is a 40 year old ( : 1980 ) male who initiated a [facility] Visit for COVID-19 (Coronavirus) evaluation and screening. When asked the question \"Please sign me up to receive news, health information and promotions from [facility].\", Lawson responded \"Yes\".    Lawson states his symptoms started 1-2 days ago.   His symptoms consist of nasal congestion, a headache, nausea, facial pain or pressure, myalgia, chills, and malaise.   Symptom details     Nasal secretions: The color of his mucus is green.    Facial pain or pressure: The facial pain or pressure feels worse when bending over or leaning forward.     Headache: He states the headache is severe (7-9 on a 10 point pain scale).      Lawson denies having cough, ear pain, anosmia, vomiting, rhinitis, wheezing, enlarged lymph nodes, fever, sore throat, teeth pain, ageusia, and diarrhea. He also denies taking antibiotic medication in the past month and having recent facial or sinus surgery in the past 60 days. He is not experiencing dyspnea.   Precipitating events  He has not recently been exposed to someone with influenza. Lawson has not been in close contact with any high risk individuals.   Pertinent COVID-19 (Coronavirus) information  In the past 14 days, Lawson has not worked in a congregate living setting.   He does not work or volunteer as healthcare worker or a  and does not work or volunteer in a healthcare facility.   Lawson also has not lived in a congregate living setting in the past 14 days. He lives with a healthcare worker.   Lawson has not had a close contact with a laboratory-confirmed COVID-19 patient within 14 days of symptom onset.   Since 2019, Lawson " and has had upper respiratory infection (URI) or influenza-like illness. Has not been diagnosed with lab-confirmed COVID-19 test      Date(s) of previous URI or influenza-like illness (free-text): December 2019 - January 2020     Symptoms Lawson experienced during previous URI or influenza-like illness as reported by the patient (free-text): Headache, chills, weakness, continuously tired, sore throat, coughing, chills        Pertinent medical history  Lawson had 1 sinus infection within the past year.   Lawson does not need a return to work/school note.   Weight: 209 lbs   Lawson does not smoke or use smokeless tobacco.   Additional information as reported by the patient (free text): 3/17/19 had bariatric surgery.   Weight: 209 lbs    MEDICATIONS: Allegra Allergy oral, acetaminophen-DM oral, ALLERGIES: NKDA  Clinician Response:  Dear Lawson,   Your symptoms show that you may have coronavirus (COVID-19). This illness can cause fever, cough and trouble breathing. Many people get a mild case and get better on their own. Some people can get very sick.  What should I do?  We would like to test you for this virus.   1. Please call [phone] to schedule your visit. Explain that you were referred by [facility] to have a COVID-19 test. Be ready to share your [facility] visit ID number.  The following will serve as your written order for this COVID Test, ordered by me, for the indication of suspected COVID [Z20.828]: The test will be ordered in [employer], our electronic health record, after you are scheduled. It will show as ordered and authorized by José Antonio Yan MD.  Order: COVID-19 (Coronavirus) PCR for SYMPTOMATIC testing from [facility].      2. When it's time for your COVID test:  Stay at least 6 feet away from others. (If someone will drive you to your test, stay in the backseat, as far away from the  as you can.)   Cover your mouth and nose with a mask, tissue or washcloth.  Go straight to the testing site. Don't make any  "stops on the way there or back.      3.Starting now: Stay home and away from others (self-isolate) until:   You've had no fever---and no medicine that reduces fever---for one full day (24 hours). And...   Your other symptoms have gotten better. For example, your cough or breathing has improved. And...   At least 10 days have passed since your symptoms started.       During this time, don't leave the house except for testing or medical care.   Stay in your own room, even for meals. Use your own bathroom if you can.   Stay away from others in your home. No hugging, kissing or shaking hands. No visitors.  Don't go to work, school or anywhere else.    Clean \"high touch\" surfaces often (doorknobs, counters, handles, etc.). Use a household cleaning spray or wipes. You'll find a full list of  on the EPA website: www.epa.gov/pesticide-registration/list-n-disinfectants-use-against-sars-cov-2.   Cover your mouth and nose with a mask, tissue or washcloth to avoid spreading germs.  Wash your hands and face often. Use soap and water.  Caregivers in these groups are at risk for severe illness due to COVID-19:  o People 65 years and older  o People who live in a nursing home or long-term care facility  o People with chronic disease (lung, heart, cancer, diabetes, kidney, liver, immunologic)  o People who have a weakened immune system, including those who:   Are in cancer treatment  Take medicine that weakens the immune system, such as corticosteroids  Had a bone marrow or organ transplant  Have an immune deficiency  Have poorly controlled HIV or AIDS  Are obese (body mass index of 40 or higher)  Smoke regularly   o Caregivers should wear gloves while washing dishes, handling laundry and cleaning bedrooms and bathrooms.  o Use caution when washing and drying laundry: Don't shake dirty laundry, and use the warmest water setting that you can.  o For more tips, go to www.cdc.gov/coronavirus/2019-ncov/downloads/10Things.pdf.    " How can I take care of myself?    Get lots of rest. Drink extra fluids (unless a doctor has told you not to).   Take Tylenol (acetaminophen) for fever or pain. If you have liver or kidney problems, ask your family doctor if it's okay to take Tylenol.   Adults can take either:    650 mg (two 325 mg pills) every 4 to 6 hours, or...   1,000 mg (two 500 mg pills) every 8 hours as needed.    Note: Don't take more than 3,000 mg in one day. Acetaminophen is found in many medicines (both prescribed and over-the-counter medicines). Read all labels to be sure you don't take too much.   For children, check the Tylenol bottle for the right dose. The dose is based on the child's age or weight.    If you have other health problems (like cancer, heart failure, an organ transplant or severe kidney disease): Call your specialty clinic if you don't feel better in the next 2 days.       Know when to call 911. Emergency warning signs include:    Trouble breathing or shortness of breath Pain or pressure in the chest that doesn't go away Feeling confused like you haven't felt before, or not being able to wake up Bluish-colored lips or face.  Where can I get more information?   [facility] -- About COVID-19: [email or web address]   CDC -- What to Do If You're Sick: www.cdc.gov/coronavirus/2019-ncov/about/steps-when-sick.html   CDC -- Ending Home Isolation: www.cdc.gov/coronavirus/2019-ncov/hcp/disposition-in-home-patients.html   CDC -- Caring for Someone: www.cdc.gov/coronavirus/2019-ncov/if-you-are-sick/care-for-someone.html   [facility] -- Interim Guidance for Hospital Discharge to Home: www.health.[facility].[place]/diseases/coronavirus/hcp/hospdischarge.pdf   [facility] clinical trials (COVID-19 research studies): clinicalaffairs.[email or web address]/umn-clinical-trials    Below are the COVID-19 hotlines at the [place] Department of Health ([facility]). Interpreters are available.    For health questions: Call 711-436-9682 or  1-624.987.3499 (7 a.m. to 7 p.m.) For questions about schools and childcare: Call 412-386-5160 or 1-486.234.1348 (7 a.m. to 7 p.m.)    Diagnosis: Nasal congestion  Diagnosis ICD: R09.81

## 2020-09-25 ENCOUNTER — AMBULATORY - HEALTHEAST (OUTPATIENT)
Dept: FAMILY MEDICINE | Facility: CLINIC | Age: 40
End: 2020-09-25

## 2020-09-25 DIAGNOSIS — Z20.822 SUSPECTED 2019 NOVEL CORONAVIRUS INFECTION: ICD-10-CM

## 2020-09-27 ENCOUNTER — COMMUNICATION - HEALTHEAST (OUTPATIENT)
Dept: SCHEDULING | Facility: CLINIC | Age: 40
End: 2020-09-27

## 2020-10-05 ENCOUNTER — AMBULATORY - HEALTHEAST (OUTPATIENT)
Dept: NURSING | Facility: CLINIC | Age: 40
End: 2020-10-05

## 2021-01-15 ENCOUNTER — HEALTH MAINTENANCE LETTER (OUTPATIENT)
Age: 41
End: 2021-01-15

## 2021-04-10 ENCOUNTER — IMMUNIZATION (OUTPATIENT)
Dept: NURSING | Facility: CLINIC | Age: 41
End: 2021-04-10
Payer: COMMERCIAL

## 2021-04-10 PROCEDURE — 91301 PR COVID VAC MODERNA 100 MCG/0.5 ML IM: CPT

## 2021-04-10 PROCEDURE — 0011A PR COVID VAC MODERNA 100 MCG/0.5 ML IM: CPT

## 2021-05-08 ENCOUNTER — IMMUNIZATION (OUTPATIENT)
Dept: NURSING | Facility: CLINIC | Age: 41
End: 2021-05-08
Attending: INTERNAL MEDICINE
Payer: COMMERCIAL

## 2021-05-08 PROCEDURE — 0012A PR COVID VAC MODERNA 100 MCG/0.5 ML IM: CPT

## 2021-05-08 PROCEDURE — 91301 PR COVID VAC MODERNA 100 MCG/0.5 ML IM: CPT

## 2021-05-27 NOTE — PROGRESS NOTES
ASSESSMENT/PLAN:       1. Seasonal allergic rhinitis due to pollen  The patient will continue on his Allegra    2. URIEL (obstructive sleep apnea)  Continue to use CPAP even if staying over in the hospital overnight.      3. Morbid obesity with BMI of 40.0-44.9, adult (H)  Will see the patient back on May 1 for preop exam.  Answered his questions about the procedure and expectations.  I think it is good that he has stopped the naltrexone.  He has omeprazole on hand is he may have some increased heartburn after his surgery.  His blood pressure initially today was elevated but on the second check it was okay.  He is not had a history of diabetes.  He has had some peripheral edema which is likely related to his weight.    25 minutes spent total with the patient with greater than 50% of that time being spent in counseling in regard to his upcoming preop evaluation and his upcoming surgery.    Dawood Mondragon MD      PROGRESS NOTE   4/17/2019    SUBJECTIVE:  Lawson Daniels is a 39 y.o. male  who presents for   Chief Complaint   Patient presents with     Establish Care     pt having surgery in 1 1/2 wanting to establish care     1. Seasonal allergic rhinitis due to pollen  The patient has seasonal allergies particularly in the spring and he uses Allegra on a as needed basis.  Is currently not having much for symptoms    2. URIEL (obstructive sleep apnea)  The patient has a history of obstructive sleep apnea using CPAP which is been very effective for him improving the quality of his sleep and decreasing his daytime somnolence.  He is using nasal pillows for the interface    3. Morbid obesity with BMI of 40.0-44.9, adult (H)  The patient has a history of morbid obesity and over about a 2-year timeframe he has been working on weight loss through the weight management program had success at losing about 50 pounds with lifestyle changes but now has gained about 30 pounds back and he is decided to go ahead with surgical  "intervention with a Oestreich sleeve which is scheduled for about a month from now.  He had been on naltrexone to decrease appetite but stopped that about 2 weeks ago.  He has had labs done about a month ago and some further preop labs scheduled for next month.  He is also scheduled to see me on May 1 for a preop exam  Patient Active Problem List   Diagnosis     Morbid obesity with BMI of 40.0-44.9, adult (H)     Acquired bilateral flat feet     Allergic rhinitis     PAC (premature atrial contraction)     Edema     Venous hypertension of lower extremity, bilateral     Valgus deformity of both feet     Snoring     Venous insufficiency of both lower extremities       Current Outpatient Medications   Medication Sig Dispense Refill     cyanocobalamin, vitamin B-12, 1,000 mcg Subl Place 1,000 mcg under the tongue daily.       fexofenadine (ALLEGRA) 180 MG tablet Take 180 mg by mouth.       multivit-mins-ferrous gluconat 9 mg iron/15 mL Liqd Take 15 mL by mouth 2 (two) times a day.        [START ON 2019] omeprazole (PRILOSEC) 20 MG capsule Take 1 capsule (20 mg total) by mouth daily. Open capsule and sprinkle on food. 90 capsule 0     No current facility-administered medications for this visit.        Social History     Tobacco Use   Smoking Status Former Smoker     Packs/day: 2.00     Years: 15.00     Pack years: 30.00     Types: Cigarettes     Start date: 1995     Last attempt to quit: 2006     Years since quittin.6   Smokeless Tobacco Never Used       Review of Systems   Eyes:        Has some issues with excessive tearing   Gastrointestinal: Positive for nausea.   Musculoskeletal: Positive for neck pain.   Neurological: Positive for headaches.       OBJECTIVE:        No results found for this or any previous visit (from the past 240 hour(s)).    Vitals:    19 1624 19 1625   BP: (!) 120/98 122/82   Pulse: 64    Weight: (!) 324 lb (147 kg)    Height: 5' 11\" (1.803 m)      Initial Weight: " 331 lbs  Weight: (!) 324 lb (147 kg)  Weight loss from initial: 0  % Weight loss: 0 %          Physical Exam:  GENERAL APPEARANCE: 39-year-old female, NAD, well hydrated, well nourished

## 2021-05-27 NOTE — PATIENT INSTRUCTIONS - HE
Your surgery is scheduled for 5/13/2019 at 9:30 am.  Please arrive at 7:30 am.    Medication Recommendations    Acetaminophen (Brand Name: Tylenol or MPAP)   You will likely be sent home on Tylenol to go with your other pain medications after surgery.  It is ok to cut/crush regular or extra strength tablets.  Make sure tablet is not long acting or extended release.  Do not take more than 3000mg in 24 hours.  If you decide to use liquid Tylenol at home, we recommend you use Adult strength because you will have to take less liquid to get the same effect.  Dilute the medication 1:1 with water before taking the liquid version to prevent dumping or stomach upset.    Fexofenadine (Brand Name: Allegra)   Ok to cut/crush tablet.  Immediate release is preferred.    Multivitamin with Iron   If not already taking, start chewable MVI with at least 18mg of iron and 10-15 mg of zinc twice a day at least 2 weeks prior to surgery and resume the day after surgery for life. Do not take the morning of surgery.    Naltrexone  Stop 2 weeks prior to surgery.  Check with surgeon and Bariatrician after surgery prior to restarting.  If you are able to restart, you can swallow naltrexone whole if small enough. Do not break, crush, divide, or chew before swallowing. If you cannot swallow naltrexone whole, contact your doctor.    Omeprazole (Brand Name: Prilosec)   If not already taking, you will get a prescription to start when you get home from the hospital.  Tablets cannot be cut or crushed.  If a capsule, entire capsule contents may be sprinkled on a spoonful of applesauce and taken immediately without chewing.  If only on a full liquid diet, dump capsule contents into a spoonful of liquid and take without chewing.  May swallow whole again starting 6 weeks after surgery but can try swallowing sooner if having issues with opening into food.  Continue after surgery for at least 3 months.    Vitamin B12   If not already taking, start  sublingual (under the tongue) vitamin B12 1,000 mcg at least 2 weeks prior to surgery and resume the day after surgery for life. Do not take the morning of surgery.          [facility]   Laparoscopic Hector-en-Y Gastric Bypass, Gastric Sleeve & Single Anastomosis Duodenal Switch  Home Discharge Instructions      Coughing and Deep Breathing   Use your incentive spirometer frequently after you get home. Continued coughing and deep breathing help prevent complications such as fevers and pneumonia. If you are fever free after one week, stop using it, and discard it.    Incision and Dressing   All incisional coverings can get wet so you may continue to shower at home.  If you have Band-Aids, they should come off while in the hospital.  Remove all steri-strips one week from your surgery date unless told otherwise by the surgeon.  If you have gauze covered by a clear dressing, remove 2-3 days after surgery as directed by the surgeon.     Notify the clinic or your surgeon if:  You develop a fever orally of 101.5 or greater, see any unusual bright red or green infection-like drainage; see redness or swelling around the incision; have left shoulder pain or pain that does not go away with your narcotic; increasing anxiety or feeling that something is just  not right;  a persistent rapid heart rate (greater than or equal to 120 beats per minute) lasting longer than 15 minutes; progressive rapid breathing; increasing shortness of breath; continuous (non-stop) hiccups lasting longer than 15 minutes; persistent nausea; if you are unable to keep liquids down; have frothy vomiting; difficulty swallowing; excessive bloating; swelling, warmth, discoloration or pain in your lower legs; dark, bright red, black, or tarry bowel movements; or anything you are concerned might be an urgent problem or need reassurance about.    Dehydration/Nausea/Vomiting  Vomiting is not normal after surgery and can be caused by drinking with meals,  eating large portions, not chewing thoroughly and drinking large sips.  If you continue to have nausea and vomiting despite following our recommendations, call the clinic.  Nausea can be caused by dehydration so make sure you are drinking the suggested amount of fluids.  Symptoms of dehydration include dark colored urine, lack of energy, nausea, dizziness, headache and a bad taste in your mouth.  If you notice any of these symptoms, please don t delay in calling the clinic.  Early identification gives us more options for treatment.    Bowel Movements  Consider that your stools might be loose since you are currently only taking in fluids. Diarrhea may be caused by dumping syndrome if you had Gastric Bypass, so make sure you aren t drinking liquids containing excess sugar.  Otherwise, call the clinic if you have 3 or more diarrhea stools per day. If constipation is a problem, it is ok to use a Dulcolax  rectal suppository, Miralax or Milk of Magnesia . Other helpful ways to avoid constipation include: increasing your fluids; stop taking narcotic medications; and increasing your activity. Adding Benefiber  daily to your liquids once you have had a stool may help keep you regular until there is more natural fiber in your diet. You may also have foul smelling gas.    Risk for Blood Clots  For the next 6 weeks, if you are in any vehicle longer than 30 minutes, stop every 30 minutes, and walk for at least 3 minutes before continuing your journey. Traveling and/or flying are not recommended for 6 weeks.  If leaving the country within the first 3-6 months after surgery, check with your surgeon first.    Activity  Do not lift greater than 20 lbs. for 2 weeks unless told differently by your surgeon. After two weeks, let your body be your guide. You may drive only after you have been completely off of narcotics for a minimum of 24 hours. Continue to shower as you have in the hospital. NO BATHING IN THE BATHTUB, SWIMMING, etc.  for 2-4 weeks.  (You need to be sure your incisions are well healed to prevent infection.)    Pain Control  You will go home with a prescription for pain medication. If your pain does not go away with this medication, please notify your surgeon. If the pain requires medication, but not something as strong as the prescription, you may try Tylenol  (acetaminophen) cut  <    inch or crushed.   DO NOT USE PRESCRIBED OR OVER THE COUNTER NONSTEROIDAL ANTI-  INFLAMMATORY MEDICATION LIKE MOTRIN, ADVIL, IBUPROFEN OR ASPIRIN.    Acid Reducer and Gallbladder Medication  It is important to reduce the amount of acid in your new stomach for a couple months after surgery while it is healing.  We will prescribe an acid reducer that you should take daily.  It is important for you to let us know if you have any symptoms of heartburn or reflux.      For those of you who still have a gallbladder, we will also prescribe a medication called Actigall (ursodiol) and we recommend taking it twice a day for 6 months.  It is only effective if you take it twice a day.  You will start taking this two weeks after surgery.    ER Needs  If you need to go to the Emergency Room, we prefer you go to the [facility] ER.  Be sure to inform the ER staff that you are a bariatric surgery patient, and ask them to notify your surgeon that you are there.    Remember to refer to your bariatric program handbook and keep follow up appointments for long-term success. You will need regular labs to check your nutritional status and it is very important to take ALL your supplements and protein religiously,    For urgent concerns on evenings, weekends & holidays, call the clinic and the message will direct you to the surgeon on call.    [facility]: [phone]

## 2021-05-27 NOTE — TELEPHONE ENCOUNTER
New Appointment Needed  What is the reason for the visit:    Pre-Op Appt Request  When is the surgery? :  5/13/19  Where is the surgery?:   [facility]  Who is the surgeon? :  Dr Hernández  What type of surgery is being done?: bariatric surgery  Provider Preference: patient would like to establish care with another provider, he need pre-op after 4/23  How soon do you need to be seen?: 4/23 or after  Waitlist offered?: No  Okay to leave a detailed message:  Yes

## 2021-05-27 NOTE — PATIENT INSTRUCTIONS - HE
"Swelling in the legs can be caused by many reasons. No matter what the reason, treatment usually includes some type of compression. You should wear your compression socks as much as you can. Your compression should be put on first thing in the morning. Take the compression off at night. It is especially important to wear them with long periods of sitting/standing, long car rides or if you will be flying. Going without compression for even brief periods of time can be damaging to your legs and your health.  Compression socks should get replaced usually every 4-6 months. They do not need to be worn at night while in bed. If we have seen you in the last year, we can refill your sock prescription, otherwise your primary care provider is able to refill them for you. Call us with any problems or questions.   Compression Velcro may need to be replaced every 9-12 months.  Often the liners and socks need to be replaced every three or four months.  The neoprene velcro may last up to 2 years.  If the velcro becomes worn a tailor may be able to repair it for you inexpensively.    If you do a lot of standing, it is good to do calf raises to help keep the blood pumping. If you sit a lot at work, it is good to get up periodically to walk around. Elevation of the foot of your bed 4-6\" helps the blood return back to where it is needed.   Please call us if you have any questions [phone]    Thank you [facility] choosing [email or web address].  "

## 2021-05-27 NOTE — PROGRESS NOTES
Date of Service:  19    Date last seen by Dr. Ramos:  18    PCP: Aidan Comer MD    Impression:  1. Bilateral leg swelling-stable  2. Venous hypertension of the legs bilaterally-stable  3. Valgus foot deformity-bilateral  4. Morbid obesity     Plan:  1. Questions were answered.  2. Continue to work with bariatrics.  Planning for surgery.   3. Compression stockings will continue to wear.  4. Continue to exercise.  5. Patient will follow up in 8 months, or when needed. I would like to see how he does with the surgery and if he needs compression adjustment.      Time spent with patient 15 minutes with greater than 50% time in consultation, education and coordination of care.     ---------------------------------------------------------------------------------------------------------------------  Chief Complaint: Bilateral leg swelling     History of Present Illness:     Lawson Daniels returns to the [facility] for his bilateral leg swelling felt to be due to venous insufficiency with dependent swelling exacerbated by valgus foot deformity and morbid obesity.  When I last saw him he was wearing his compression.   He was working with his CPAP machine.  He was working with bariatric surgery.  He was to increase his exercise program.  He is here for follow-up.  He last saw Dr. Hogan on 3/20/2019.  He is getting scheduled for bariatric surgery specifically the laparoscopic sleeve gastrectomy.  He is using his CPAP machine.  He is sleeping well.  He works out regularly and coaches lacrosse. There has been no new numbness, tingling or weakness.  There have been no new masses, rashes, or swellings of any other joints. There has been no new decrease in appetite, unexplained weight loss, abdominal bloating and bowel or bladder changes.    Past Medical History:   Diagnosis Date     Abnormal ECG     PACs     Fatty liver      Hx of migraines     monthly     Jaw  fracture (H) 1993     Lower leg edema     compression socks. sees Dr. Ramos     Morbid obesity with BMI of 40.0-44.9, adult (H)      PAC (premature atrial contraction) 2/24/2016     Pes Planus      Radius head fracture December 2008    left     Sleep apnea     using cpap       Past Surgical History:   Procedure Laterality Date     CHOLECYSTECTOMY  May 2006    stones.     LAPAROSCOPIC CHOLECYSTECTOMY  May 2006    [place], [place]- [facility]     LASIK Bilateral 2015     VASECTOMY  4/30/2010    Dr Pickering at [facility]       Current Outpatient Medications:      fexofenadine (ALLEGRA) 180 MG tablet, Take 180 mg by mouth., Disp: , Rfl:      multivit-mins-ferrous gluconat 9 mg iron/15 mL Liqd, Take 15 mL by mouth 2 (two) times a day. , Disp: , Rfl:      naltrexone (DEPADE) 50 mg tablet, Start half tab daily for 10 days then increase to half tab twice daily (breakfast and supper)., Disp: 60 tablet, Rfl: 0    Allergies   Allergen Reactions     Demerol [Meperidine] Nausea And Vomiting     Sumatriptan Succinate      Annotation: heartburn, dizziness, upset stomach, would not want to use it again       Tramadol Nausea And Vomiting     Annotation: when broke toe, given tramadol, had nausea and vomiting from that         Social History     Socioeconomic History     Marital status:      Spouse name: Mitchel     Number of children: 2     Years of education: Not on file     Highest education level: Not on file   Occupational History     Occupation:      Employer: [employer]   Social Needs     Financial resource strain: Not on file     Food insecurity:     Worry: Not on file     Inability: Not on file     Transportation needs:     Medical: Not on file     Non-medical: Not on file   Tobacco Use     Smoking status: Former Smoker     Packs/day: 2.00     Years: 15.00     Pack years: 30.00     Types: Cigarettes     Start date: 8/23/1995     Last attempt to quit: 8/23/2006      Years since quittin.6     Smokeless tobacco: Never Used   Substance and Sexual Activity     Alcohol use: Yes     Alcohol/week: 0.0 - 1.2 oz     Comment: now and then     Drug use: No     Sexual activity: Yes     Partners: Female     Birth control/protection: Surgical     Comment: 2 kids 10-12 yo   Lifestyle     Physical activity:     Days per week: Not on file     Minutes per session: Not on file     Stress: Not on file   Relationships     Social connections:     Talks on phone: Not on file     Gets together: Not on file     Attends Sabianist service: Not on file     Active member of club or organization: Not on file     Attends meetings of clubs or organizations: Not on file     Relationship status: Not on file     Intimate partner violence:     Fear of current or ex partner: Not on file     Emotionally abused: Not on file     Physically abused: Not on file     Forced sexual activity: Not on file   Other Topics Concern     Not on file   Social History Narrative    .  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       Family History   Problem Relation Age of Onset     Breast cancer Mother      Myasthenia gravis Father      Snoring Father      Breast cancer Maternal Grandmother      ALS Paternal Grandmother      No Medical Problems Son      No Medical Problems Daughter        Review of Systems:  Lawson Daniels no new numbess, tingling or weakness, redness or rashes, fevers, new masses, abdominal bloating or discomfort, unexplained weight loss, increased pain, new ulcers, shortness of breath and chest pain  Full 12 point review of systems was completed.    Imaging:    I personally reviewed the following imaging today and those on care everywhere, if indicated    Nothing new to review    Labs:    I personally reviewed the following labs today and those on care everywhere, if indicated    Lab Results   Component Value Date    SEDRATE 3 2016         Lab Results   Component Value Date    CRP 0.4 2016            Lab Results   Component Value Date    CREATININE 0.90 03/20/2019      Lab Results   Component Value Date    HGBA1C 5.0 03/20/2019           Lab Results   Component Value Date    BUN 17 03/20/2019              Lab Results   Component Value Date    ALBUMIN 4.4 03/20/2019       Vitamin D, Total (25-Hydroxy)   Date Value Ref Range Status   03/20/2019 32.4 30.0 - 80.0 ng/mL Final       Lab Results   Component Value Date    TSH 1.68 03/20/2019     Lab Results   Component Value Date    WBC 10.4 03/20/2019    HGB 16.1 03/20/2019    HCT 47.0 03/20/2019    MCV 90 03/20/2019     03/20/2019     Physical Exam:  Vitals:    03/27/19 0919   BP: 138/82   Pulse: 76   Resp: 20   Temp: 98.4  F (36.9  C)      BMI 46.03 (increased) weight 330 pounds    Circumferential measures:    Vasc Edema 8/23/2017 11/30/2017 3/12/2018 3/27/2019   Right just above MTP 26.5 26.0 26 26.5   Right Ankle 26.3 25.2 25.5 26.3   Right Widest Calf 49.8 49.0 49.5 49.3   Right Thigh Up 10cm 61 60.0 57 59.8   Left - just above MTP 27 26.5 26.5 26.9   Left Ankle 24.8 24.0 25.5 26   Left Widest Calf 48 48.5 46 48.5   Left Thigh Up 10cm 60 59.2 53 59     Measures stable      General:  39 y.o. male in no apparent distress.    Psych: Alert and oriented x 3.  Cooperative. Affect normal.    HEENT: Atraumatic and normocephalic.     Musculoskeletal:  Normal range of motion in knees and ankles bilaterally.  There is no active joint synovitis, erythema, swelling or joint laxity.       Neurological:  Strength testing is normal in ankle dorsiflexion and great toe extension bilaterally.       Vascular: Dorsalis pedis and posterior tibialis pulses are strong and equal bilaterally. There are slight telangietasias, medial ankle venous flares and spider veins.     Integumentary: Skin of the legs is uniformly warm and dry and with negative Stemmer's Sign. Slight hemosiderin deposition bilateral distal anterior calves.   Nails are normal.  No unusual skin  changes.      Laura Ramos MD, ABWMS, FACCWS, [facility]  Medical Director Wound Care and Lymphedema  [facility]  [phone]

## 2021-05-27 NOTE — PROGRESS NOTES
Patient attended group class to review pre-op instructions and dietary plan for upcoming surgery.    Pt will begin 2-week pre-op liquid diet 4/29/2019, will do clear liquids the day before surgery. Pt is scheduled for Gastric Sleeve on 5/13/2019, and will then follow 2 week post-op liquid diet. Pt will f/u with RD 1-week post-op for further diet advancement.    Educated pt on 2-week pre-op and 1-week post-op liquid diets. Discussed appropriate liquids and demonstrated portions for each of the food groupings during each diet phase. Reviewed appropriate calories/protein/fluid goals during 2-week pre-op liquid diet. Educated on correct vitamins/minerals to take after surgery in correct dosage and frequency. Provided grocery list and sample menu plan for each diet stage, as well as unflavored protein powder samples.       Discussed admission process and hospital course.  Pharmacy information packet given and explained. Patient was given exercises to work on post-op for maintaining muscle mass and strengthening that was created by [facility].  Bariatric quiz given to patient for a review on their own.  Discharge instructions, information card and follow up appointments given and reviewed with pt at this time and patient verbalized understanding. He will have his H&P on 5/1/2019 with Dr. Mondragon and do his  pre-op testing during that visit with him as well. Prescriptions for Omeprazole sent to the patient's pharmacy to be started after surgery.      Ayaka Clifton, RN, CBN

## 2021-05-27 NOTE — TELEPHONE ENCOUNTER
PATIENT STATED THAT HE SPOKE TO LJ STANFORD ABOUT GETTING HELP FINDING A NEW PCP SINCE HIS RETIRED. PATIENT CALLING TO SEE IF SHE WOULD STILL BE ABLE TO HELP WITH THIS. OK TO LEAVE A DETAILED MESSAGE -[phone]

## 2021-05-28 NOTE — ANESTHESIA PROCEDURE NOTES
Spinal Block    Patient location during procedure: OR  Start time: 5/13/2019 9:10 AM  End time: 5/13/2019 9:15 AM  Reason for block: at surgeon's request and post-op pain management    Staffing:  Performing  Anesthesiologist: Artie Wolf MD    Preanesthetic Checklist  Completed: patient identified, risks, benefits, and alternatives discussed, timeout performed, consent obtained, at patient's request, airway assessed, oxygen available, suction available, emergency drugs available and hand hygiene performed  Spinal Block  Patient position: sitting  Prep: ChloraPrep  Patient monitoring: heart rate, cardiac monitor, continuous pulse ox and blood pressure  Approach: midline  Location: L3-4  Injection technique: single-shot  Needle type: pencil-tip   Needle gauge: 24 G

## 2021-05-28 NOTE — ANESTHESIA PREPROCEDURE EVALUATION
Anesthesia Evaluation      Patient summary reviewed   No history of anesthetic complications     Airway   Mallampati: II  Neck ROM: full   Pulmonary - normal exam   (+) sleep apnea on CPAP, ,                          Cardiovascular - normal exam  Exercise tolerance: > or = 4 METS  (+) dysrhythmias, ,      Neuro/Psych - negative ROS     Endo/Other    (+) obesity,      GI/Hepatic/Renal - negative ROS           Dental    (+) caps                       Anesthesia Plan  Planned anesthetic: general endotracheal and ITN  50 mg ketamine IV on induction.  Intrathecal Duramorph for post op pain control per surgeon request.  ASA 3   Induction: intravenous   Anesthetic plan and risks discussed with: patient  Anesthesia plan special considerations: antiemetics,   Post-op plan: routine recovery

## 2021-05-28 NOTE — TELEPHONE ENCOUNTER
"Post-Op Phone Call  [facility] Bariatric Care    Surgeon: Marcos Hernández M.D.  Date of Surgery: 5/13/2019  Discharge Date: 5/14/2019    Date/Time Called:   Date: 5/16/2019 Time: 2:45 PM   Attempt: First    Pain Control:  Intensity: Mild (1 - 3)  Duration/Location/Explain: \"tightness\" on the incisions on the right side  What makes it better/worse? Pain medication and movement  Medications:  Narcotic Use - No  Drug type: NA  Frequency: NA    Non-prescription pain control: Tylenol every 6 hours    Other medications currently taking: Gabapentin around the clock per dosage, See medication list    Complete Multivitamin + Iron BID? Yes    Vitamin B12? sublingual daily    Diabetics: NA    Incisions:  Drainage? clean and dry  Comment: slightly itchy at the incisions, but tolerable.      Intake/Output:  Fluid Intake(oz/day)? 56 sips so far of the full med cup  Fluid type? Water mostly, protein shake,     Heartburn? No  Counseling:  Yes  Nausea? No  Vomiting? No  BM? Yes - more liquid  Gas? Yes    Voiding Frequency? 4 or more/day     Are you using your incentive spirometer? If yes, how often? 3+/day    Any fever type symptoms? No    Walking activity? Yes  Frequency/Type: Walking, trying to get a couple big walks a day    In Preparation for Surgery:  On a scale of 1-5, with 5 being the highest, how well did the pre-op class prepare you for what actually happened in the hospital? 5    Is there anything that you wish you would have known prior to surgery that you did not know? If yes, what? no    On a scale of 1-5, with 5 being the highest, how was the service while you were in the hospital? way above 5 - the whole staff was amazing, Nicola at night was very friendly and patient.\"      Is/was there anyone in particular; nurse, aide, hospital staff, that did a great job and you would like us to recognize? If yes, whom. Nursing assistant before Nicola was also awesome - he was checking in with him on the way out and was paying " attention to the IV when it has started to inflitrate    Would you recommend [facility] to others? Yes    Thank you for your time. Please do not hesitate to call us with any questions or concerns.    Call completed by: Ayaka Clifton

## 2021-05-28 NOTE — PROGRESS NOTES
Preoperative Exam    Scheduled Procedure: Bariatric Surgery  Surgery Date:  May 13th 2019  Surgery Location: [facility], fax [phone]    Surgeon:  Dr. Hernández     Assessment/Plan:     1. Pre-operative clearance    - HM1(CBC and Differential)  - Comprehensive Metabolic Panel  - APTT(PTT)  - INR  - HM1 (CBC with Diff)  -EKG, Sinus rhythm, with PACs and incomplete RBBB, no change from 2018  -Chest x-ray, Normal    2. Morbid obesity with BMI of 40.0-44.9, adult (H)  The patient has not had complications of diabetes and is not on any medication for hypertension.  The patient does have a history of metabolic syndrome with triglycerides in the 300s and 33.  He certainly is at risk for diabetes without weight loss    3. Obstructive sleep apnea syndrome  Patient has severe obstructive sleep apnea using CPAP with great results        Surgical Procedure Risk: Intermediate (reported cardiac risk generally 1-5%)  Have you had prior anesthesia?: Yes  Have you or any family members had a previous anesthesia reaction:  Had trouble getting put under for endoscopy previously.  Do you or any family members have a history of a clotting or bleeding disorder?: No  Cardiac Risk Assessment: no increased risk for major cardiac complications  The patient is able to exercise 4 METs without needing to rest and has no significant shortness of breath or chest pain associated with that level of exercise  He has no family history of anesthesia related complications  He has had some nausea and vomiting after anesthesia in the past  No complications of bleeding or blood clots    Patient approved for surgery with general or local anesthesia.  Please note: labs, CXR and EKG reviewed and no concerns    Please Note:  Patient uses a CPAP machine.    Functional Status: Independent  Patient plans to recover at home with family.     Subjective:      Lawson Daniels is a 39 y.o. male who presents for a preoperative consultation.  He presently  feels well with no complaints including no respiratory or GI acute illness.  Over the last 6 weeks he has lost about 17 pounds with the ketogenic diet.    All other systems reviewed and are negative, other than those listed in the HPI.    Pertinent History  Do you have difficulty breathing or chest pain after walking up a flight of stairs: No  History of obstructive sleep apnea: Yes: uses cpap machine  Steroid use in the last 6 months: No  Frequent Aspirin/NSAID use: No  Prior Blood Transfusion: No  Prior Blood Transfusion Reaction: No  If for some reason prior to, during or after the procedure, if it is medically indicated, would you be willing to have a blood transfusion?:  There is no transfusion refusal.    Current Outpatient Medications   Medication Sig Dispense Refill     fexofenadine (ALLEGRA) 180 MG tablet Take 180 mg by mouth.       multivit-mins-ferrous gluconat 9 mg iron/15 mL Liqd Take 15 mL by mouth 2 (two) times a day.        [START ON 5/14/2019] omeprazole (PRILOSEC) 20 MG capsule Take 1 capsule (20 mg total) by mouth daily. Open capsule and sprinkle on food. 90 capsule 0     No current facility-administered medications for this visit.         Allergies   Allergen Reactions     Demerol [Meperidine] Nausea And Vomiting     Sumatriptan Succinate      Annotation: heartburn, dizziness, upset stomach, would not want to use it again       Tramadol Nausea And Vomiting     Annotation: when broke toe, given tramadol, had nausea and vomiting from that         Patient Active Problem List   Diagnosis     Morbid obesity with BMI of 40.0-44.9, adult (H)     Acquired bilateral flat feet     Allergic rhinitis     PAC (premature atrial contraction)     Edema     Venous hypertension of lower extremity, bilateral     Valgus deformity of both feet     Snoring     Venous insufficiency of both lower extremities     Sleep apnea       Past Medical History:   Diagnosis Date     Abnormal ECG     PACs     Fatty liver      Hx of  migraines     monthly     Jaw fracture (H)      Lower leg edema     compression socks. sees Dr. Ramos     Morbid obesity with BMI of 40.0-44.9, adult (H)      PAC (premature atrial contraction) 2016     Pes Planus      Radius head fracture 2008    left     Sleep apnea     using cpap       Past Surgical History:   Procedure Laterality Date     CHOLECYSTECTOMY  May 2006    stones.     LAPAROSCOPIC CHOLECYSTECTOMY  May 2006    [place], [place]- [facility]     LASIK Bilateral 2015     VASECTOMY  2010    Dr Pickering at [facility]       Social History     Socioeconomic History     Marital status:      Spouse name: Mitchel     Number of children: 2     Years of education: Not on file     Highest education level: Not on file   Occupational History     Occupation:      Employer: [employer]   Social Needs     Financial resource strain: Not on file     Food insecurity:     Worry: Not on file     Inability: Not on file     Transportation needs:     Medical: Not on file     Non-medical: Not on file   Tobacco Use     Smoking status: Former Smoker     Packs/day: 2.00     Years: 15.00     Pack years: 30.00     Types: Cigarettes     Start date: 1995     Last attempt to quit: 2006     Years since quittin.7     Smokeless tobacco: Never Used   Substance and Sexual Activity     Alcohol use: Not Currently     Alcohol/week: 0.0 - 1.2 oz     Comment: now and then     Drug use: No     Sexual activity: Yes     Partners: Female     Birth control/protection: Surgical     Comment: 2 kids 10-12 yo   Lifestyle     Physical activity:     Days per week: Not on file     Minutes per session: Not on file     Stress: Not on file   Relationships     Social connections:     Talks on phone: Not on file     Gets together: Not on file     Attends Buddhist service: Not on file     Active member of club or organization: Not on file     Attends meetings of clubs or  "organizations: Not on file     Relationship status: Not on file     Intimate partner violence:     Fear of current or ex partner: Not on file     Emotionally abused: Not on file     Physically abused: Not on file     Forced sexual activity: Not on file   Other Topics Concern     Not on file   Social History Narrative    .  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       Patient Care Team:  Dawood Mondragon MD as PCP - General (Family Medicine)          Objective:     Vitals:    05/01/19 1616   BP: 120/82   Pulse: 61   SpO2: 95%   Weight: (!) 313 lb 11.2 oz (142.3 kg)   Height: 5' 11.5\" (1.816 m)         Physical Exam:  Physical Exam   Constitutional: He is oriented to person, place, and time. He appears well-developed and well-nourished.   HENT:   Mouth/Throat: Oropharyngeal exudate present.   Posterior pharynx malum potty score 4 with marked narrowing   Eyes: Pupils are equal, round, and reactive to light.   Neck: Neck supple. No JVD present. No thyromegaly present.   Cardiovascular: Normal rate, regular rhythm and normal heart sounds.   No murmur heard.  Pulmonary/Chest: Effort normal and breath sounds normal.   Abdominal: Soft. Bowel sounds are normal. He exhibits no mass. There is no tenderness.   Laparoscopic scars from previous laparoscopic cholecystectomy   Musculoskeletal: Normal range of motion.   Trace of edema in the lower legs and ankles bilaterally   Lymphadenopathy:     He has no cervical adenopathy.   Neurological: He is alert and oriented to person, place, and time.   Skin: No rash noted.   Particular attention to rash on the abdomen in the region of his surgery and no concerns noted   Psychiatric: He has a normal mood and affect. His behavior is normal. Judgment and thought content normal.   Nursing note and vitals reviewed.        EKG: No change from 1 year ago with PACs noted and a incomplete right bundle branch block    Labs:  INR 1.10  CBC normal with Hgb 15.8  UA negative except for positive " ketones  Metabolic panel ALT 51, T Bili 1.1,  PTT 29      Immunization History   Administered Date(s) Administered     Influenza, Seasonal, Inj PF IIV3 09/27/2010, 10/06/2015     Influenza, inj, historic,unspecified 11/05/2014, 09/15/2016, 10/23/2018     Influenza, seasonal,quad inj 6-35 mos 09/27/2010     Influenza,seasonal quad, PF, 36+MOS 11/16/2017     Influenza,seasonal, Inj IIV3 10/12/2011, 10/10/2012, 10/28/2013     Tdap 11/16/2015           Electronically signed by Dawood Mondragon MD 05/03/19 4:10 PM

## 2021-05-28 NOTE — ANESTHESIA CARE TRANSFER NOTE
Patient spontaneously breathing.  Tidal volumes > 400ml.  Following commands, suctioned and extubated with balloon down.  O2 via mask at 10L.  To PACU, VSS, SBAR report to RN per institutional handoff policy and procedure.  Transfer of care.    Last vitals:   Vitals:    05/13/19 1054   BP: 142/71   Pulse: 72   Resp: 18   Temp: 36.7  C (98.1  F)   SpO2: 97%     Patient's level of consciousness is drowsy  Spontaneous respirations: yes  Maintains airway independently: yes  Dentition unchanged: yes  Oropharynx: oropharynx clear of all foreign objects    QCDR Measures:  ASA# 20 - Surgical Safety Checklist: WHO surgical safety checklist completed prior to induction    PQRS# 430 - Adult PONV Prevention: 4558F - Pt received => 2 anti-emetic agents (different classes) preop & intraop  ASA# 8 - Peds PONV Prevention: NA - Not pediatric patient, not GA or 2 or more risk factors NOT present  PQRS# 424 - Natasha-op Temp Management: 4559F - At least one body temp DOCUMENTED => 35.5C or 95.9F within required timeframe  PQRS# 426 - PACU Transfer Protocol: - Transfer of care checklist used  ASA# 14 - Acute Post-op Pain: ASA14B - Patient did NOT experience pain >= 7 out of 10

## 2021-05-28 NOTE — TELEPHONE ENCOUNTER
I completed FMLA and and Fitness for Duty forms for Lawson. Per his message, he is ready to go back on 05/20/19 with no restrictions.    Forms are in [facility] and will be brought over to [facility] on Monday for physicians signature.    Fabio Kuhn, [facility]  P: [phone]  F: [phone]

## 2021-05-28 NOTE — ANESTHESIA POSTPROCEDURE EVALUATION
Patient: Lawson Daniels  LAPAROSCOPIC SLEEVE GASTRECTOMY  Anesthesia type: general    Patient location: PACU  Last vitals:   Vitals Value Taken Time   /56 5/13/2019 11:15 AM   Temp 36.8  C (98.2  F) 5/13/2019 11:00 AM   Pulse 58 5/13/2019 11:18 AM   Resp 24 5/13/2019 11:18 AM   SpO2 95 % 5/13/2019 11:18 AM   Vitals shown include unvalidated device data.  Post vital signs: stable  Level of consciousness: awake and responds to simple questions  Post-anesthesia pain: pain controlled  Post-anesthesia nausea and vomiting: no  Pulmonary: unassisted, nasal cannula  Cardiovascular: stable and blood pressure at baseline  Hydration: adequate  Anesthetic events: no    QCDR Measures:  ASA# 11 - Natasha-op Cardiac Arrest: ASA11B - Patient did NOT experience unanticipated cardiac arrest  ASA# 12 - Natasha-op Mortality Rate: ASA12B - Patient did NOT die  ASA# 13 - PACU Re-Intubation Rate: ASA13B - Patient did NOT require a new airway mgmt  ASA# 10 - Composite Anes Safety: ASA10A - No serious adverse event    Additional Notes:

## 2021-05-29 NOTE — PROGRESS NOTES
Time in: 4:00 pm Time out: 4:30 pm  .     Pt presents for 1 month post op dietitian follow up class . Reports tolerating soft food diet well. Denies n/v, constipation/diarrhea, or significant pain. Taking MVI and SL B12 appropriately.Instructed pt to begin taking 500 mg calcium citrate BID and 5000 IU Vitamin D3. Educated pt on soft to bariatric regular diets, medications, developing an exercise regimen, and other dietary points of care. Provided  Education handout on items discussed . Pt to follow up with RD at 3 months post op.

## 2021-05-29 NOTE — PROGRESS NOTES
Time in: 10:00a   /Time out: 11:00a     BMI: There is no height or weight on file to calculate BMI.     Pt presents for 1 week post op dietitian follow up. Reports tolerating full liquids well. Denies n/v, constipation/diarrhea, or significant pain. Taking MVI and SL B12 appropriately. Taking 70 oz fluid/day. Educated pt on pureed and soft to bariatric regular diets. Provided grocery list and sample meal plan for each diet stage.  Pt will begin pureed diet on 5/27/2019 and advance as tolerated to softs/bariatric regular on 6/17/2019. Instructed pt to begin 500 mg calcium citrate BID and 5000IU Vitamin D3 on 6/17/2019. Pt to follow up with RD at 3 months post op.     ++FMLA form given to pt from [employer]

## 2021-05-29 NOTE — PROGRESS NOTES
HPI: Pt is here for follow up of a Laparoscopic Sleeve Gastrectomy. he is doing well. Taking po well. No vomiting. No fevers or chills. Ambulating without problems.       There were no vitals taken for this visit.  Wt Readings from Last 3 Encounters:   05/13/19 (!) 280 lb (127 kg)   05/01/19 (!) 313 lb 11.2 oz (142.3 kg)   04/22/19 (!) 327 lb (148.3 kg)     There is no height or weight on file to calculate BMI.    EXAM:  GENERAL:Appears well  ABDOMEN: Incisions healing well      Assessment/Plan: Pt s/p Laparoscopic Sleeve Gastrectomy. Doing well. Diet and activity discussed. he will [facility] in [facility] months.    Marcos Hernández MD  [facility] Department of Surgery

## 2021-05-30 VITALS — BODY MASS INDEX: 45.9 KG/M2 | WEIGHT: 315 LBS

## 2021-05-30 VITALS — WEIGHT: 315 LBS | BODY MASS INDEX: 45.15 KG/M2

## 2021-05-31 VITALS — WEIGHT: 315 LBS | BODY MASS INDEX: 46.58 KG/M2

## 2021-05-31 VITALS — BODY MASS INDEX: 44.1 KG/M2 | WEIGHT: 315 LBS | HEIGHT: 71 IN

## 2021-05-31 VITALS — BODY MASS INDEX: 44.1 KG/M2 | HEIGHT: 71 IN | WEIGHT: 315 LBS

## 2021-05-31 VITALS — WEIGHT: 315 LBS | BODY MASS INDEX: 46.64 KG/M2

## 2021-05-31 VITALS — HEIGHT: 71 IN | BODY MASS INDEX: 44.1 KG/M2 | WEIGHT: 315 LBS

## 2021-05-31 VITALS — BODY MASS INDEX: 47.32 KG/M2 | WEIGHT: 315 LBS

## 2021-05-31 NOTE — PROGRESS NOTES
"Post-op Surgical Weight Loss Diet Evaluation     Assessment:  Pt presents for 3 months post-op RD visit, s/p LSG on 5/13/2019 with Dr. Hernández. Today we reviewed current eating habits and level of physical activity, and instructed on the changes that are required for successful bariatric outcomes.    Patient Progress: Pt is doing amazing! He has been hiking and kayaking and really enjoying life!  -children are running with him  -does not have a weight goal and answered questions throughout    Pt's Initial Weight: 343 lbs  Weight: (!) 234 lb (106.1 kg)  Weight loss from initial: 109  % Weight loss: 31.78 %      Body mass index is 32.18 kg/m .     Concerns: none at this point; pt is on track and doing well  +discussed strength training    Vitamins   Multi Vit with Iron: yes  Calcium Citrate: yes  B12: yes  D3: yes    Do you experience hunger? Yes  Do you have \"dumping\" syndrome? No  Do you experience any reflux or discomfort with eating? No - taking omeprazole   Nausea: no  Vomiting: no  Diarrhea: no  Constipation: no  Hair loss:no    Diet Recall/Time:   Breakfast: 2 eggs w/ cutie outside OR oikos greek yogurt (15g)   Am Snack:  Protein shake (30g)  Lunch: tuna packets w/ fruit   Pm snack: Protein shake (30g)   Dinner: Pro/CHO (15g)  HS Snack: sometimes protein chemo if working ou (30g)    Typical Snacks: protein shake     Proteins/Veg/Fruits/CHO (NOT well tolerated): none    Estimated protein intake: 100 grams    Estimated portion size per meals: 1/2-3/4 cup/meal    Incorporation of vegetables, fruits, carbohydrates into diet/meals using   Bariatric \"Plate Method\"   The patient and I discussed the importance of including lean/low fat protein at each meal, including a vegetable/fruit, and limiting carbohydrate intake to less than 25% of plate volume. Always keeping within approved perimeters of post op meal portion sizes according to 3 months post op guidelines.    Meals per week away from home: rare  Recommended " "limiting eating out to no more than 2x/week.    Meal Duration:20 minutes  Encouraged slowing meal times down, 20-30 minutes, chewing to applesauce consistency.     Fluid-meal separation:  Fluids are  30min before and 30 minutes after meals.  The patient and I reviewed the anatomy of the bypass and why  fluids from a meal is so important.    Fluid Intake  Water: 64oz plus  Caffeine: none  Alcohol: none  Carbonation: none  Milk: none  Juice: 100% jucy juice - rare     Discussed the importance of adequate hydration after surgery and the goal of 64+ oz of fluid daily.   The patient understands the importance of avoiding all carbonated, caffeinated, and sweetened drinks; and instead choosing 64oz plain water.    Exercise  Running and hiking  Discussed strength training - plans to this during the winter months    Pt's understands that 30-60 minutes of daily activity is an important part of bariatric surgery success.   Encouraged pt to incorporate strength training exercise along with cardiovascular exercise as well, most days of the week.    PES statement:    1. (NC-1.4) Altered GI Function related to Alteration in gastrointestinal tract structure and/or function/ Decreased functional length of the GI tract as evidenced by Weight loss of 20% initial body weight; sleeve gastrectomy    Intervention    Discussion  1. Discussed 3 months  Post-Op Nutritional Guidelines for LSG  2. Recommended to consume 15-20gm protein at 3 meals daily, along with protein supplement/\"planned protein containing snack\" of 15-30gm protein, to reach goal of 60-80 gm protein daily.  3. Educated on post-op vitamin regimen: Multi Vit + iron 2x/day, calcium citrate 400-600 mg 2x/day, 4243-5001 mcg of Sublingual B-12 daily, and 5000 IU Vitamin D3 daily (MVI and calcium can be taken at the same time BID)  4. Reviewed lean protein sources  5. Bariatric Plate Method-  including lean/low fat protein at each meal, including a " "vegetable/fruit, and limiting carbohydrate intake to less than 25% of plate volume. Approved perimeters of post op meal portion sizes according to 3 months post op guidelines.  Instructions  1. Include 15-20gm protein at each meal, along with protein supplement/\"planned protein containing snack\" of 15-30gm protein, to reach goal of 60-80 gm protein daily.  2. Increase fluid intake to 64oz daily: choose plain or calorie/carbonation-free beverages.  3. Incorporate daily structured activity, 30-60 minutes most days of the week  4. Recommended pt to start taking: Multi Vit + iron 2x/day, calcium citrate 400-600 mg 2x/day, 8105-5845 mcg of Sublingual B-12 daily, and 5000 IU Vitamin D3 daily. (MVI and calcium can be taken at the same time)  5. Read food labels more consistently: keeping total fat grams <10, total sugar grams <10, fiber >3gm per serving.  6. Increase vegetable/fruit intake, by having a vegetable or fruit with each meal daily.  7. Practice plate method: 1/2 plate lean/low fat protein source, vegetable/fruit, <25% of plate complex carbohydrates.  8. Separate fluids 30 minutes before/after meal times.  9. Practice eating off of smaller plates/bowls, chewing to applesauce consistency, taking 20-30 minutes to eat in a calm/relaxed environment without distractions of tv/email/cell phone.    Handouts provided:  3 months  Post-Op Nutritional Guidelines for LSG    Monitor/Evaluation    Pt to follow up for 6 months  post-op visit with bariatrician - scheduled     Time In: 7:50a  Time Out: 8:30a    ABN signed: Yes    "

## 2021-06-01 VITALS — BODY MASS INDEX: 42.38 KG/M2 | HEIGHT: 71 IN | WEIGHT: 302.7 LBS

## 2021-06-01 VITALS — WEIGHT: 314.7 LBS | BODY MASS INDEX: 44.06 KG/M2 | HEIGHT: 71 IN

## 2021-06-01 VITALS
WEIGHT: 306 LBS | BODY MASS INDEX: 42.87 KG/M2 | HEIGHT: 71 IN | HEIGHT: 71 IN | WEIGHT: 306.2 LBS | BODY MASS INDEX: 42.84 KG/M2

## 2021-06-01 VITALS — BODY MASS INDEX: 44.91 KG/M2 | WEIGHT: 315 LBS | HEIGHT: 71 IN | BODY MASS INDEX: 44.1 KG/M2 | WEIGHT: 315 LBS

## 2021-06-01 VITALS — BODY MASS INDEX: 44.1 KG/M2 | WEIGHT: 315 LBS | HEIGHT: 71 IN

## 2021-06-01 VITALS — HEIGHT: 71 IN | WEIGHT: 314 LBS | BODY MASS INDEX: 43.96 KG/M2

## 2021-06-01 VITALS — BODY MASS INDEX: 45.19 KG/M2 | WEIGHT: 315 LBS

## 2021-06-01 VITALS — HEIGHT: 71 IN | BODY MASS INDEX: 42.84 KG/M2 | WEIGHT: 306 LBS

## 2021-06-01 VITALS — HEIGHT: 71 IN | BODY MASS INDEX: 47 KG/M2 | BODY MASS INDEX: 47 KG/M2 | WEIGHT: 315 LBS

## 2021-06-01 VITALS — HEIGHT: 71 IN | WEIGHT: 315 LBS | BODY MASS INDEX: 44.1 KG/M2

## 2021-06-01 VITALS — BODY MASS INDEX: 44.1 KG/M2 | HEIGHT: 71 IN | WEIGHT: 315 LBS

## 2021-06-02 VITALS — WEIGHT: 315 LBS | BODY MASS INDEX: 44.1 KG/M2 | HEIGHT: 71 IN

## 2021-06-02 VITALS — HEIGHT: 71 IN | BODY MASS INDEX: 42.42 KG/M2 | WEIGHT: 303 LBS

## 2021-06-02 VITALS — BODY MASS INDEX: 44.1 KG/M2 | WEIGHT: 315 LBS | HEIGHT: 71 IN

## 2021-06-02 VITALS — BODY MASS INDEX: 44.1 KG/M2 | HEIGHT: 71 IN | WEIGHT: 315 LBS

## 2021-06-02 VITALS — WEIGHT: 315 LBS | BODY MASS INDEX: 44.04 KG/M2

## 2021-06-03 VITALS — BODY MASS INDEX: 31.69 KG/M2 | HEIGHT: 72 IN | WEIGHT: 234 LBS

## 2021-06-03 VITALS — HEIGHT: 72 IN | BODY MASS INDEX: 37.93 KG/M2 | WEIGHT: 280 LBS

## 2021-06-03 VITALS — HEIGHT: 71 IN | WEIGHT: 315 LBS | BODY MASS INDEX: 44.1 KG/M2

## 2021-06-03 VITALS — BODY MASS INDEX: 45.61 KG/M2 | WEIGHT: 315 LBS

## 2021-06-03 VITALS — WEIGHT: 313.7 LBS | BODY MASS INDEX: 42.49 KG/M2 | HEIGHT: 72 IN

## 2021-06-03 VITALS — HEIGHT: 72 IN | WEIGHT: 282 LBS | BODY MASS INDEX: 38.19 KG/M2

## 2021-06-03 NOTE — PATIENT INSTRUCTIONS - HE
Plan:  1. Great work, 130 lbs down wonderful fitness and a model patient thus far. Consider adding in some nutrition on your long runs about every 25-35 minutes.     2. Check labs and consider adjustments to your regimen based on results. For now until they come back, you can reduce your calcium to once daily.  Continue good hydration and protein intake.    3. If daytime sleepiness recurs/snoring or apneic spells redevelops restart your cpap or we'll consider follow up test to determine severity/need for treatment.      [facility]  Nutritional Guidelines  Gastric Sleeve 6 Months Post Op    General Guidelines and Helpful Hints:    Eat 3 meals per day + protein supplement(s). No snacks between meals.  o Do not skip meals.  This can cause overeating at the next meal and will prevent adequate protein and nutritional intake.    Aim for 60-80 grams of protein per day.   o Always eat your protein first. This assists with optimal nutrition and helps you stay full longer.  o To achieve daily protein goals, it is recommended to drink approved protein supplement between meals.    Follow appropriate portion size: Use measuring cups to be accurate.    Months Post Op Portion Size per Meal   6 months 1/2 cup   7-8 months 1/2 - 2/3 cup   8 -9 months 2/3 - 3/4 cup   10-12 months 3/4 - 1 cup   12 months and beyond 1 cup maximum       Continue to use saucer/salad plates, infant/toddler silverware to keep portion sizes small and take small bites.    Eat S-L-O-W-L-Y to make each meal last 20-30 minutes. Always stop eating when satisfied.    Continue to use caution with foods containing skins, peels or membranes. Chew well!    Aim for 64 oz. of calorie-free fluids daily.  o Continue to avoid caffeine, carbonation and alcohol.  o Remember to avoid drinking during meals, 15-30 minutes before and 30 minutes after.    Aim for 30-60 minutes of physical activity most days of the week.    If having trouble tolerating meat, try  using a crock-pot, tinfoil tent, steamer or other moist cooking method to create tender meats. Add broth or low-fat gravy to help meat stay moist.     Avoid high sugar and high fat foods to prevent high calorie intake. This will reduce your rate of weight loss.  o Check nutrition labels for less than 10 grams of sugar and less than 10 grams of fat per serving.    Continue Taking Vitamins/Minerals:  o 1000 mcg of Sublingual B-12 3 days weekly should be minimum.  o 1 Multivitamin with Iron twice daily (chewable or swallow tabs)  o 500-600 mg Calcium Citrate once daily (chewable or swallow tabs)  o 5000 IU Vitamin D3 daily    Sample Grocery List    Protein:    Fat free Greek or light yogurt (less than 10 grams sugar)    Fat free or low-fat cottage cheese    String cheese or reduced fat cheese slices    Tuna, salmon, crab, egg, or chicken salad made with light or fat free mayonnaise    Egg or Egg Substitute    Lean/extra lean turkey, beef, bison, venison (ground, sirloin, round, flank)    Pork loin or tenderloin (grilled, baked, broiled)    Fish such as salmon, tuna, trout, tilapia, etc. (grilled, baked, broiled)    Tender cuts of lean (skinless) turkey or chicken    Lean deli meats: turkey, lean ham, chicken, lean roast beef    Beans such as kidney, garbanzo, black, ospina, or low-fat/fat free refried beans    Peanut butter (natural preferred). Limit to 1 Tbsp. per day.    Low-fat meatloaf (made with lean ground beef or turkey)    Sloppy Joes made with low-sugar ketchup and lean ground beef or turkey    Soy or vegetable protein (i.e. vegan crumbles, soy/veggie burger, tofu)    Hummus    Vegetables:    Fresh: cooked or raw (as tolerated)    Frozen vegetables    Canned vegetables (low sodium or no salt added, rinse before cooking/eating)    (Ok to have skins/peels/membranes/seeds - just chew well)    Fruits:    Fresh fruit    Frozen fruit (no sugar added)    Canned fruit (packed in its own juice, NOT syrup)    (Ok to have  skins/peels/membranes/seeds - just chew well)    Starch:    Unsweetened whole-grain hot cereal (or high fiber cold cereal, dry)    Toasted whole wheat bread or [place] Thins    Whole grain crackers    Baked/boiled/mashed potato/sweet potato    Cooked whole grain pasta, brown rice, or other cooked whole grains    Starchy vegetables: corn, peas, winter squash    Protein Supplement:     Ready to drink protein shake with:  o 15-30 grams protein per serving  o Less than 10 grams total carbohydrate per serving     Protein powder mixed with:  o  Skim or 1% milk  o Low fat or fat free Lactaid milk, plain or no sugar added soymilk  o Water     Fats: (use in moderation)    1 teaspoon of soft tub margarine    1 teaspoon olive oil, canola oil, or peanut oil    1 tablespoon of low-fat blue or salad dressing     Sample Menu for 6 months after Gastric Sleeve    You do NOT need to eat/drink the full portion sizes listed below  Always stop when you are satisfied  Breakfast 6 Tbsp. 1% cottage cheese   2 Tbsp. peaches    Lunch 2 oz. lean hamburger or veggie burger  1-2 tsp. salsa   Supplement Approved Protein Shake   (Have between meals throughout the day)   Dinner 6 Tbsp. chicken breast  1-2 Tbsp. green beans     Breakfast 2 Eggs or   cup scrambled egg substitute product   Lunch 7 Tbsp. low-fat Sloppy Alejandro mixture   2 high fiber crackers   Supplement Approved Protein Shake   (Have between meals throughout the day)   Dinner 6 Tbsp. grilled, broiled, or baked lemon pepper salmon  2 Tbsp. asparagus     Breakfast 6 Tbsp. light yogurt with 2 Tbsp. Grape Nuts or high fiber cereal    Lunch   cup of chili made with extra lean ground beef and kidney beans   Dinner 5 Tbsp. pork loin made in a crock pot  2 Tbsp. cooked broccoli  1 Tbsp. baked potato with 2 sprays of spray margarine    Supplement Approved Protein Shake   (Have between meals throughout the day)     Breakfast 6 Tbsp. lean ham  2 Tbsp. seedless melon   Lunch 7 Tbsp. diced turkey  with 1 teaspoon low-fat gravy  1 Tbsp. green beans   Dinner 6 Tbsp. extra lean ground beef mixed with low sugar spaghetti sauce  2 Tbsp. whole wheat pasta   Supplement Approved Protein Shake   (Have between meals throughout the day)     Breakfast 2 oz turkey or soy based sausage taya    Lunch 6 Tbsp. low-fat cottage cheese  2 Tbsp. pineapple   Supplement Approved Protein Shake   (Have between meals throughout the day)   Dinner 7 Tbsp. beef tenderloin  1 Tbsp. asparagus     Breakfast 1/2 of a whole wheat English Muffin (toasted)  1 Tbsp. creamy natural peanut butter   Lunch   cup of black bean soup with 1 Tbsp. low fat shredded cheese   Dinner 7 Tbsp. low-fat turkey meat loaf   1 Tbsp. cooked carrots   Supplement Approved Protein Shake   (Have between meals throughout the day)     Breakfast 6 Tbsp. scrambled egg or scrambled egg substitute  1 Tbsp. finely chopped bell pepper  1 Tbsp. low fat shredded cheese   Lunch 7 Tbsp. lean diced ham  1 Tbsp. mandarin oranges   Supplement Approved Protein Shake   (Have between meals throughout the day)   Dinner 6 Tbsp. flaked fish   2 Tbsp. mashed sweet potato

## 2021-06-04 VITALS
BODY MASS INDEX: 28.82 KG/M2 | HEIGHT: 72 IN | DIASTOLIC BLOOD PRESSURE: 62 MMHG | WEIGHT: 212.8 LBS | HEART RATE: 45 BPM | SYSTOLIC BLOOD PRESSURE: 118 MMHG | OXYGEN SATURATION: 99 %

## 2021-06-04 VITALS
RESPIRATION RATE: 28 BRPM | HEIGHT: 72 IN | BODY MASS INDEX: 28.31 KG/M2 | TEMPERATURE: 98.2 F | SYSTOLIC BLOOD PRESSURE: 114 MMHG | WEIGHT: 209 LBS | HEART RATE: 68 BPM | DIASTOLIC BLOOD PRESSURE: 62 MMHG

## 2021-06-04 NOTE — PROGRESS NOTES
Pt has not been wearing bilateral knee high compression; only wear compression when he is traveling. No tingling, numbness or pain.

## 2021-06-04 NOTE — PATIENT INSTRUCTIONS - HE
"To order compression socks through [email or web address]:     Compression needed:  15-20 mmHg and 20-30 mmHg    Length:  knee high    Toe Piece:  closed toe    Measures:        Inches (in)  Centimeters (cm)    Ankle     Calf (Widest Part)     Thigh     Length-heel to knee       Short   Regular     Long       You may need to search \"compression sock large or extra large\"      Please call one of the [facility] locations below to schedule an appointment. If you received a prescription please bring it with you to your appointment. Some locations are limited to what they carry.    Office Locations    [facility]  [address] [place] [address]  Home Medical Equipment, [address]   Phone: [phone]   Orthotics and Prosthetics, [address]   Phone: [phone]    [facility]  Home Medical Equipment  [address], [address], [place] [address]   Phone: [phone]    Orthotics and Prosthetics ([facility])    [address], [address], [place] [address]  Phone: [phone]      "
Patient Specific Counseling (Will Not Stick From Patient To Patient): -Discussed most likely diagnosis \\n-Advised to treat today with LN2 today
Detail Level: Detailed

## 2021-06-04 NOTE — PROGRESS NOTES
Date of Service:  19    Date last seen by Dr. Ramos:  19    PCP: Dawood Mondragon MD    Impression:  1. Bilateral leg swelling-greatly improved  2. Venous hypertension/insufficiency of the legs bilaterally-improved  3. Valgus foot deformity-bilateral  4. Bilateral flat feet  5. Foot deformities due to above     Plan:  1. Questions were answered.  2. Compression stockings will continue to wear.  Discussed ways to get them over-the-counter.  Discussed the benefits with heavy exercise.  3. He is putting significant stress on the skeletal system especially with the severe hyperpronation and valgus ankles and his feet.  I highly recommend good insoles with arch support.  He needs to get ones that are comfortable for him and that we can adjust as necessary.  Insoles were written for.  4. Patient will follow up in 2 months, or when needed to check on the insoles.    Time spent with patient 15 minutes with greater than 50% time in consultation, education and coordination of care.     ---------------------------------------------------------------------------------------------------------------------  Chief Complaint: Bilateral leg swelling     History of Present Illness:     Lawson [facility] for his bilateral leg swelling due to venous insufficiency with dependent swelling exacerbated by valgus foot deformity and morbid obesity.  He was to continue his compression stockings, increase his exercise and continue to work with bariatric surgery.  He was scheduled for laparoscopic sleeve gastrectomy per Dr. Hogan.  This was done 2019.  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  He has lost over 100 pounds and is exercising extremely aggressively now.  He runs regularly.  He has been able to do 5K's.  He can run 10 miles.  He is feeling great and very excited about his increased abilities.  He continues to follow closely with bariatrics.   There has been no new numbness, tingling or weakness.  There have been no new masses, rashes, or swellings of any other joints. There has been no new decrease in appetite, unexplained weight loss, abdominal bloating and bowel or bladder changes.    Past Medical History:   Diagnosis Date     Abnormal ECG     PACs     Fatty liver      Hx of migraines     monthly     Jaw fracture (H) 1993     Lower leg edema     compression socks. sees Dr. Ramos     Morbid obesity with BMI of 40.0-44.9, adult (H)      PAC (premature atrial contraction) 2/24/2016     Pes Planus      Radius head fracture December 2008    left     Sleep apnea     using cpap       Past Surgical History:   Procedure Laterality Date     CHOLECYSTECTOMY  May 2006    stones.     LAPAROSCOPIC CHOLECYSTECTOMY  May 2006    [place], [place]- [facility]     LASIK Bilateral 2015     DE LAP, KRISTEN RESTRICT PROC, LONGITUDINAL GASTRECTOMY N/A 5/13/2019    Procedure: LAPAROSCOPIC SLEEVE GASTRECTOMY;  Surgeon: Marcos Hernández MD;  Location: [facility];  Service: General     VASECTOMY  4/30/2010    Dr Pickering at [facility]       Current Outpatient Medications:      calcium citrate (CALCITRATE) 200 mg (950 mg) tablet, Take 1 tablet by mouth daily. , Disp: , Rfl:      cholecalciferol, vitamin D3, 5,000 unit Tab, Take 5,000 Units by mouth daily., Disp: , Rfl:      cyanocobalamin, vitamin B-12, 1,000 mcg Subl, Place 1,000 mcg under the tongue daily., Disp: , Rfl:      fexofenadine (ALLEGRA) 180 MG tablet, Take 180 mg by mouth every evening.    , Disp: , Rfl:      pediatric multivitamin-iron (POLY-VI-SOL WITH IRON) chewable tablet, Chew 1 tablet 2 (two) times a day., Disp: , Rfl:     Allergies   Allergen Reactions     Demerol [Meperidine] Nausea And Vomiting     Sumatriptan Succinate      Annotation: heartburn, dizziness, upset stomach, would not want to use it again       Tramadol Nausea And Vomiting     Annotation: when broke toe, given tramadol, had  nausea and vomiting from that         Social History     Socioeconomic History     Marital status:      Spouse name: Mitchel     Number of children: 2     Years of education: Not on file     Highest education level: Not on file   Occupational History     Occupation:      Employer: [employer]   Social Needs     Financial resource strain: Not on file     Food insecurity:     Worry: Not on file     Inability: Not on file     Transportation needs:     Medical: Not on file     Non-medical: Not on file   Tobacco Use     Smoking status: Former Smoker     Packs/day: 2.00     Years: 15.00     Pack years: 30.00     Types: Cigarettes     Start date: 1995     Last attempt to quit: 2006     Years since quittin.3     Smokeless tobacco: Never Used   Substance and Sexual Activity     Alcohol use: Not Currently     Alcohol/week: 0.0 - 2.0 standard drinks     Comment: now and then     Drug use: No     Sexual activity: Yes     Partners: Female     Birth control/protection: Surgical     Comment: 2 kids 10-14 yo   Lifestyle     Physical activity:     Days per week: Not on file     Minutes per session: Not on file     Stress: Not on file   Relationships     Social connections:     Talks on phone: Not on file     Gets together: Not on file     Attends [employer] service: Not on file     Active member of club or organization: Not on file     Attends meetings of clubs or organizations: Not on file     Relationship status: Not on file     Intimate partner violence:     Fear of current or ex partner: Not on file     Emotionally abused: Not on file     Physically abused: Not on file     Forced sexual activity: Not on file   Other Topics Concern     Not on file   Social History Narrative    .  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       Family History   Problem Relation Age of Onset     Breast cancer Mother      Myasthenia gravis Father      Snoring Father      Breast cancer  Maternal Grandmother      ALS Paternal Grandmother      No Medical Problems Son      No Medical Problems Daughter        Review of Systems:  Lawson TERRAZAS Grams no new numbess, tingling or weakness, redness or rashes, fevers, new masses, abdominal bloating or discomfort, unexplained weight loss, increased pain, new ulcers, shortness of breath and chest pain  Full 12 point review of systems was completed.    Imaging:    I personally reviewed the following imaging results today and those on care everywhere, if indicated    Nothing new to review    Labs:    I personally reviewed the following lab results today and those on care everywhere, if indicated    Lab Results   Component Value Date    SEDRATE 3 09/12/2016         Lab Results   Component Value Date    CRP 0.4 09/12/2016           Lab Results   Component Value Date    CREATININE 0.78 11/13/2019      Lab Results   Component Value Date    HGBA1C 4.7 11/13/2019           Lab Results   Component Value Date    BUN 17 11/13/2019              Lab Results   Component Value Date    ALBUMIN 4.2 11/13/2019       Vitamin D, Total (25-Hydroxy)   Date Value Ref Range Status   11/13/2019 73.3 30.0 - 80.0 ng/mL Final       Lab Results   Component Value Date    TSH 1.68 03/20/2019     Lab Results   Component Value Date    WBC 5.7 11/13/2019    HGB 14.6 11/13/2019    HCT 43.3 11/13/2019    MCV 94 11/13/2019     11/13/2019     Physical Exam:  Vitals:    12/11/19 1122   BP: 114/62   Pulse: 68   Resp: 28   Temp: 98.2  F (36.8  C)      BMI 28.74 (greatly decreased) weight 209 (-121) pounds    Circumferential measures:    Vasc Edema 8/23/2017 11/30/2017 3/12/2018 3/27/2019 12/11/2019   Right just above MTP 26.5 26.0 26 26.5 24.8   Right Ankle 26.3 25.2 25.5 26.3 23.6   Right Widest Calf 49.8 49.0 49.5 49.3 43.5   Right Thigh Up 10cm 61 60.0 57 59.8 -   Left - just above MTP 27 26.5 26.5 26.9 24.8   Left Ankle 24.8 24.0 25.5 26 22.7   Left Widest Calf 48 48.5 46 48.5 42   Left Thigh  Up 10cm 60 59.2 53 59 -     Measures greatly decreased     General:  39 y.o. male in no apparent distress.    Psych: Alert and oriented x 3.  Cooperative. Affect normal.    HEENT: Atraumatic and normocephalic.     Musculoskeletal:  Normal range of motion in knees and ankles bilaterally.  There is no active joint synovitis, erythema, swelling or joint laxity.  Severe hyperpronation of gait with a valgus ankles and resultant impaired gait with distal foot eversion.      Neurological:  Strength testing is normal in ankle dorsiflexion and great toe extension bilaterally.  Sensation intact pinprick and light touch in lower extremities bilaterally.      Vascular: Dorsalis pedis and posterior tibialis pulses are strong and equal bilaterally. There are slight telangietasias, medial ankle venous flares and spider veins.     Integumentary: Skin of the legs is uniformly warm and dry and with negative Stemmer's Sign. Slight hemosiderin deposition bilateral distal anterior calves to knees.   Nails are normal.  No unusual skin changes.    Laura Ramos MD, ABWMS, FACCWS, [facility]  Medical Director Wound Care and Lymphedema  [facility]  [phone]

## 2021-06-08 NOTE — PROGRESS NOTES
SUBJECTIVE: Lawson Daniels is a 36 y.o.  male who presents today with a complaint of ear pain and sinus pain.  He tells me he's been sick for weeks.  Back in December he saw Dr. Torres and was given a Z-Rupesh for sinusitis and he did get better but then started to get sick again and now is sinusitis kicked in and he is noting that even though he is using [place] pot intermittently he's getting worse.  Now more than his sinusitis is ear is paining him and he feels a bit dizzy at times and complains of decreased hearing.  He feels quite fatigued.  He did receive his flu shot at work this year.    OBJECTIVE:   Visit Vitals     /80     Pulse 80     Temp 98.9  F (37.2  C)     Wt (!) 329 lb 1.6 oz (149.3 kg)     SpO2 94%     BMI 45.9 kg/m2     General: Morbidly obese younger gentleman in no acute distress  HEENT: Eyes are clear, nose without rhinorrhea, left ear shows fluid behind the TM but no sign of infection, right ear shows a reddened T [place] that is bulging, some tenderness to palpation of the sinuses  Heart: Regular rate and rhythm without murmur  Lungs: Clear bilaterally  Abdomen: Soft, nontender  Extremities: Warm, dry and without edema    ASSESSMENT & PLAN:    1. Sinusitis  amoxicillin-clavulanate (AUGMENTIN) 875-125 mg per tablet   2. ROM (right otitis media)  amoxicillin-clavulanate (AUGMENTIN) 875-125 mg per tablet     Since he has an ear infection I am more willing to treat concurrently with Augmentin for sinusitis.  I will give him 10 days of Augmentin.  He is to continue with using his [place] pot more aggressively than he was in the past.  He will follow-up on an as-needed basis.    Patient Active Problem List   Diagnosis     Morbid obesity with BMI of 40.0-44.9, adult     Allergic rhinitis     PAC (premature atrial contraction)     Edema       Current Outpatient Prescriptions on File Prior to Visit   Medication Sig Dispense Refill     fexofenadine (ALLEGRA) 180 MG tablet Take 180 mg by  mouth.       ranitidine (ZANTAC) 150 MG tablet Take 1 tablet (150 mg total) by mouth 2 (two) times a day. 60 tablet 1     azithromycin (ZITHROMAX Z-RANDY) 250 MG tablet Take 2 tablets on first day then 1 tablet qd x 4 days 6 tablet 0     No current facility-administered medications on file prior to visit.

## 2021-06-11 NOTE — PROGRESS NOTES
Chief Complaint   Patient presents with     Ankle Pain     x on and off for 1 year. Flared up this past weekend. Both ankles are swollen        History of Present Illness: Nursing notes reviewed. Patient related he has had swelling of both of his lower extremities to the point of it being difficult to put his shoes on over the past year. There is no clear correlation with body position, with his legs and feet still being edematous in the morning after being in bed all night. No recent shortness of breath. He has already had a cardiology workup to address the edema, and his labs looking for an inflammatory condition last year were unremarkable. His feet sometime have pain when the swelling is especially severe. It seems to get worse with warmer weather. I reviewed the CT scan of pelvis and abdomen from last year, and no obstructive vascular process was noted. He states he prefers not to use a diuretic for treatment, due to him having problems with dehydration in the past.    Review of systems: See history of present illness, otherwise negative.     Current Outpatient Prescriptions   Medication Sig Dispense Refill     fexofenadine (ALLEGRA) 180 MG tablet Take 180 mg by mouth.       DEXTROMETHORPHAN HBR (VICKS DAYQUIL COUGH ORAL) Take by mouth.       ranitidine (ZANTAC) 150 MG tablet Take 1 tablet (150 mg total) by mouth 2 (two) times a day. 60 tablet 1     No current facility-administered medications for this visit.        Past Medical History:   Diagnosis Date     Jaw fracture 1993     Morbid obesity with BMI of 40.0-44.9, adult      PAC (premature atrial contraction) 2/24/2016     Pes Planus      Radius head fracture December 2008    left      Past Surgical History:   Procedure Laterality Date     LAPAROSCOPIC CHOLECYSTECTOMY  May 2006    [place], [place]- [facility]     LASIK Bilateral 2015     VASECTOMY  4/30/2010    Dr Pickering at [facility]      Social History     Social History     Marital status:       Spouse name: Mitchel     Number of children: 2     Years of education: N/A     Occupational History      [employer]     Social History Main Topics     Smoking status: Former Smoker     Types: Cigarettes     Smokeless tobacco: Former User     Alcohol use No     Drug use: No     Sexual activity: Not Asked     Other Topics Concern     None     Social History Narrative       History   Smoking Status     Former Smoker     Types: Cigarettes   Smokeless Tobacco     Former User      Exam:   Blood pressure 138/82, pulse 67, temperature 99.1  F (37.3  C), temperature source Oral, resp. rate 14, weight (!) 334 lb 6.4 oz (151.7 kg), SpO2 97 %.    EXAM:   General: Vital signs reviewed. Patient is in no acute appearing distress with a normal pleasant affect. Breathing is non labored appearing. Patient is alert and oriented x 3.   Heart: Normal rate and rhythm without murmur  Lungs: Clear to auscultation with good air flow bilaterally.  Lower extremity exam shows about 3 mm of pitting edema in the bilateral shins, calves, ankles, and feet. On the right mid-shin is an area of skin changes about 4-5 cm diameter, which he states has been present for about a year with little change. It is occasionally itchy. I advised him that this is likely related to circulatory changes associated with the swelling.    Assessment/Plan   1. Chronic edema  Ambulatory referral to Lymphedema Care    bilateral lower extremity         There are no Patient Instructions on file for this visit.   Alec Manley DO

## 2021-06-12 NOTE — PROGRESS NOTES
Referred By: Aidan Comer MD    Date Of Service: 08/23/2017    Chief Complaint: Bilateral leg swelling    History of Present Illness:    Lawson Daniels presents to the [facility], as a new consult, with Bilateral leg swelling.  The swelling began June 2016 on a business trip to [place] after working out on a treadmill. When he returned home he was given water pills that seemed to help.  Now during the summer it is worse and when he sits too long.  His job involves a lot of sitting.   There was no other associated trauma, medication change or major illness.  He had an US which was negative for DVT.  Previous treatments have included diuretics and compression socks.  He wears the compression socks about once a week.  They are not comfortable.  He has gained a lot of weight over 60 pounds in the last few years.  He used to run 1/2 marathons.  The swelling been stable since onset. There is no pain.  The patient sleeps in a bed.  He is snores heavily.  His wife notices he sometimes stops breathing at night for short periods.  He has not have any sleep studies.  There has been no new numbness, tingling or weakness.  There have been no new masses, rashes, or swellings of any other joints. There has been no new decrease in appetite, unexplained weight loss, abdominal bloating and bowel or bladder changes    Past Medical History:   Diagnosis Date     Jaw fracture 1993     Morbid obesity with BMI of 40.0-44.9, adult      PAC (premature atrial contraction) 2/24/2016     Pes Planus      Radius head fracture December 2008    left       Past Surgical History:   Procedure Laterality Date     LAPAROSCOPIC CHOLECYSTECTOMY  May 2006    [place], [place]- [facility]     LASIK Bilateral 2015     VASECTOMY  4/30/2010    Dr Pickering at [facility]         Current Outpatient Prescriptions:      fexofenadine (ALLEGRA) 180 MG tablet, Take 180 mg by mouth., Disp: , Rfl:      amoxicillin-clavulanate (AUGMENTIN)  875-125 mg per tablet, Take 1 tablet by mouth 2 (two) times a day for 10 days., Disp: 20 tablet, Rfl: 0     DEXTROMETHORPHAN HBR (VICKS DAYQUIL COUGH ORAL), Take by mouth., Disp: , Rfl:      ranitidine (ZANTAC) 150 MG tablet, Take 1 tablet (150 mg total) by mouth 2 (two) times a day., Disp: 60 tablet, Rfl: 1    Allergies   Allergen Reactions     Demerol [Meperidine] Nausea And Vomiting     Sumatriptan Succinate      Annotation: heartburn, dizziness, upset stomach, would not want to use it again       Tramadol Nausea And Vomiting     Annotation: when broke toe, given tramadol, had nausea and vomiting from that         Social History     Social History     Marital status:      Spouse name: Mitchel     Number of children: 2     Years of education: N/A     Occupational History      [employer]     Social History Main Topics     Smoking status: Former Smoker     Packs/day: 2.00     Years: 15.00     Types: Cigarettes     Start date: 8/23/1995     Quit date: 8/23/2009     Smokeless tobacco: Never Used     Alcohol use 1.2 oz/week     2 Cans of beer per week     Drug use: No     Sexual activity: Yes     Partners: Female     Birth control/ protection: Surgical     Other Topics Concern     Not on file     Social History Narrative    .  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       Family History   Problem Relation Age of Onset     Breast cancer Mother      Myasthenia gravis Father      Breast cancer Maternal Grandmother      ALS Paternal Grandmother      No Medical Problems Son      No Medical Problems Daughter        Review of Systems:  Review of systems is otherwise negative, except as noted above.  Full 12 point review of systems was completed.    Radiology/Diagnostic Studies:   CT ABDOMEN PELVIS W ORAL W IV CONTRAST  10/31/2016 4:03 PM      INDICATION: epigastric, periumbilical and LLQ pain for last 2 months, worse over last 3 days  TECHNIQUE: CT abdomen and pelvis.  Multiplanar reformation images (MPR). Dose reduction techniques were used.   IV CONTRAST: Iohexol (Omni) 100 mL  COMPARISON: None.     FINDINGS:  LUNG BASES: Negative.     ABDOMEN: Punctate granulomatous calcifications in the spleen and liver. Normal appearance of the pancreas and adrenal glands. Severe hepatic steatosis. Status post cholecystectomy. No biliary ductal dilatation. Normal kidneys without hydronephrosis or   hydroureter. No ureteral calculi. Normal stomach and small bowel. The major vascular structures of the abdomen are normal in caliber and are patent.     PELVIS: Normal urinary bladder. Normal prostate gland and seminal vesicles. Normal appendix and colon.     MUSCULOSKELETAL: Small fat-containing umbilical hernia.     IMPRESSION:   CONCLUSION:  1.  Severe hepatic steatosis.  2.  Status post cholecystectomy. No biliary ductal dilatation.  3.  No bowel obstruction. Normal appendix.  4.  Normal kidneys without hydronephrosis or hydroureter.  5.  Small fat-containing umbilical hernia. Minimal associated fat stranding is nonspecific.  Patient: Lawson Daniels   MRN: [id number]  : 1980  Primary Care Provider: Aidan Comer MD  Ordering Physician: Cb Davis MD  Date of Service: 2016  Location: [facility]      Indication: Left ventricular enlargement     TECHNIQUE:  ECG gated MRI of the heart without and with IV contrast (45 mL Magnevist of IV gadolinium) performed on a Siemens Aera 1.5 Maribell MRI scanner.  Axial HASTE and steady state free precession images, multiplanar steady-state free procession cine images, multiplanar gradient echo cine images, T1 and T2 Turbo spin-echo images with and without Fat saturation suppression, Grid tagging images, resting first pass contrast perfusion images, and delayed contrast-enhanced phase sensitive inversion recovery images were obtained.  Physical myocardial measurements were obtained by the physician using [employer] workstation.      FINDINGS:  LEFT VENTRICLE:  Left ventricle is normal size with normal systolic function. Normal myocardial mass index. There is no regional wall motion abnormality.  There is no intraventricular mass or thrombus. No myocardial scar.       LV EF:  70.2% (normal 56 to 78%)     Absolute Volumes:   LV EDV:  150 mL (normal 77 to 195 mL)  LV ESV:  45 mL (normal 19 to 72 mL)   LV SV:  105.9 mL (normal  51 to 133 mL)   Cardiac Output:  7.73 L/minute (normal 2.8 to 8.8 L/minute)  Myocardial Mass:  211 g (normal 118 to 238 g)  Index Volumes: Height: 73 inches, Weight: 319 pounds  LV EDV Index: 57.4 mL/sq-m (normal 47 to 92 mL/sq-m)  LV ESV Index: 17.1 mL/sq-m (normal 13 to 30 mL/sq-m)  LV SV Index:  40.3 mL/sq-m (normal 32 to 62 ml/sq-m)  Cardiac lndex:  2.94 L/minute/sq-m (normal 1.7 to 4.2 L/minute/sq-m)  Myocardial Mass Index:  80.6 (70 to 113 g/m2)     RIGHT VENTRICLE:  Right ventricle is normal size with normal right ventricular systolic function.  No aneurysmal wall or regional wall motion abnormalities.      ATRIA:  Mild left atrial enlargement (32 ml/m2).   Normal right atrial size. No atrial septal defect.  AORTIC VALVE:  Tricuspid aortic valve.  No aortic insufficiency. No significant aortic stenosis.  MITRAL VALVE:  No obvious mitral valve structure abnormality or mitral valve stenosis.  No significant mitral regurgitation.  TRICUSPID VALVE:  Normal structure. No stenosis. No tricuspid regurgitation.    PULMONIC VALVE:  No significant pulmonic stenosis or regurgitation.    PERICARDIUM:  No pericardial effusion.    AORTA:  Normal thoracic size and its major branches.       EXTRACARDIAC STRUCTURES:  Please refer to radiology interpretation for extracardiac structures     IMPRESSIONS:    1.  Normal left ventricular size and systolic function. The quantified left ventricular ejection fraction is 70.2%. No myocardial scar is identified.  Normal indexed myocardial mass.   2.  Normal right ventricular size and function    3.  Mild left atrial enlargement (32 ml/m2)  4.  No significant valvular abnormalites    Laboratory Studies:    Lab Results   Component Value Date    SEDRATE 3 09/12/2016         Lab Results   Component Value Date    CRP 0.4 09/12/2016           Lab Results   Component Value Date    CREATININE 0.87 09/12/2016          Lab Results   Component Value Date    BUN 15 09/12/2016              Lab Results   Component Value Date    ALBUMIN 4.2 09/12/2016         Lab Results   Component Value Date    TSH 1.11 08/15/2016     Lab Results   Component Value Date    WBC 10.7 10/31/2016    HGB 15.2 09/12/2016    HCT 43.9 09/12/2016    MCV 89 09/12/2016     09/12/2016       Physical Exam:  Vitals:    08/23/17 1452   BP: 142/78   Pulse: 81   Resp: 18   Temp: 98.6  F (37  C)     BMI 47.28    See flow chart for circumferential measures.    Vasc Edema 8/23/2017   Right just above MTP 26.5   Right Ankle 26.3   Right Widest Calf 49.8   Right Thigh Up 10cm 61   Left - just above MTP 27   Left Ankle 24.8   Left Widest Calf 48   Left Thigh Up 10cm 60     General:  37 y.o. male in no apparent distress.  Alert and oriented x 3.  Cooperative. Affect normal.    Respiratory:  Lungs are clear to auscultation throughout with full inspiration    Cardiovascular: Normal S1, S2 without murmur, gallop or rub.  No audible carotid bruits. Regular rhythm.     Abdominal: Normal bowel sounds without any pain, guarding or rigidity. No inguinal lymphadenopathy palpated.  Exam somewhat compromised by morbid obesity.    Musculoskeletal:  Normal range of motion in hips, knees and ankles bilaterally throughout .  There is no active joint synovitis, erythema, swelling or joint laxity.  Valgus foot deformity bilaterally with flat feet.      Neurological:  Sensation is intact to pin prick and light touch in both legs.  Strength testing is normal in hip flexion, knee flexion, knee extension, ankle dorsiflexion and great toe extension bilaterally. Deep tendon  reflexes, knee jerks and ankle jerks, are normal bilaterally.      Vascular: Dorsalis pedis and posterior tibialis pulses are strong and equal bilaterally and reveal audible biphasic waves with a hand held doppler bilaterally. There are no significant telangietasias, medial ankle venous flares, venous varicosities  and spider veins . There is normal capillary refill.     Integumentary: Skin of the legs is uniformly warm and dry and with negative Stemmer's Sign. Slight hemosiderin deposition bilateral distal anterior calves.   Nails are normal    Impression:  1.  Bilateral leg swelling  2.  Venous hypertension of the legs bilaterally  3.  Valgus foot deformity-bilateral  4.  Morbid obesity    Plan:  1. Type of swelling was reviewed in detail with the patient.  Questions were answered and education was completed.  2. Discussed contribution of weight to swelling.  He agrees to bariatric medicine referral to help with this.  3.  Insoles for foot deformities and decrease stress on skeletal system so he can exercise. Importance and recommendations of exercise reviewed.    4.  Compression stockings written for.  5.  Patient will follow up in 3 months or when needed..     Thank you very much for allowing me to see Lawson Daniels today.  If you have any questions, please feel free to contact me.    Time spent with patient 60 minutes with greater than 50% time in consultation, education and coordination of care.     Laura Ramos MD, ABWMS, FACCWS, [facility]  Medical Director Wound Care and Lymphedema  [facility]  [phone]

## 2021-06-12 NOTE — PROGRESS NOTES
ASSESSMENT:   1. Sinusitis         PLAN:  Sinusitis  Likely viral at this time. If not improving in 3 -4 days, then  You may start the antibiotic that has been provided. Recommend probiotics such as acidophillis and bifidus to replace the normal bacteria that lives in the colon and gets depleted by antibiotics. You can buy it as an over the counter supplement (Culturelle, Florajen) or eat yogurt with live or active cultures.  Recommend using a decongestant such as Sudafed and/or Flonase or Nasocort to lessen nasal congestion.   Push fluids, get extra rest  Recommend hot steamy showers, saline nasal spray or a netti pot to relieve congestion  Return to clinic if symptoms are not improving as expected or if worsening in any way.       SUBJECTIVE:   Lawson Daniels is a 37 y.o. male presents today with cold symptoms for 6 days. He has had facial pressure/pain, fatigue and congestion but denies coryza, sore throat, post nasal drip, dry cough, myalgias, fever and chills. Sick contacts: none. Has tried ibuprofen  with relief. He does have seasonal allergies but has been taking Allegra quite consistently.     Past Medical History:   Diagnosis Date     Jaw fracture 1993     Morbid obesity with BMI of 40.0-44.9, adult      PAC (premature atrial contraction) 2/24/2016     Pes Planus      Radius head fracture December 2008    left       History   Smoking Status     Former Smoker     Types: Cigarettes   Smokeless Tobacco     Former User       Current Medications:  Current Outpatient Prescriptions   Medication Sig Dispense Refill     fexofenadine (ALLEGRA) 180 MG tablet Take 180 mg by mouth.       amoxicillin-clavulanate (AUGMENTIN) 875-125 mg per tablet Take 1 tablet by mouth 2 (two) times a day for 10 days. 20 tablet 0     DEXTROMETHORPHAN HBR (VICKS DAYQUIL COUGH ORAL) Take by mouth.       ranitidine (ZANTAC) 150 MG tablet Take 1 tablet (150 mg total) by mouth 2 (two) times a day. 60 tablet 1     No current  facility-administered medications for this visit.        Allergies:   Allergies   Allergen Reactions     Demerol [Meperidine] Nausea And Vomiting     Sumatriptan Succinate      Annotation: heartburn, dizziness, upset stomach, would not want to use it again       Tramadol Nausea And Vomiting     Annotation: when broke toe, given tramadol, had nausea and vomiting from that         OBJECTIVE:   Vitals:    08/21/17 1411   BP: 124/81   Patient Site: Right Arm   Patient Position: Sitting   Cuff Size: Thigh   Pulse: 80   Resp: 14   Temp: 98.5  F (36.9  C)   TempSrc: Oral   SpO2: 97%   Weight: (!) 339 lb 4.8 oz (153.9 kg)     Physical exam reveals a pleasant 37 y.o. male.   Appears alert and cooperative.  Eyes:  JAYMIE, EOMI  Ears:  normal TMs bilaterally and normal canals bilaterally  Nose:    Mucosa normal. Scant, clear rhinorrhea.septum midline, normal mucosa and clear rhinorrhea  Mouth:  Mucosa pink and moist.  normal-appearing mucosa and no pharyngitis, no exudate   Neck: normal, supple and no adenopathy  Sinuses: nontender with palpation  Lungs: Chest is clear, no wheezing or rales. Symmetric air entry throughout both lung fields.  Heart: regular rate and rhythm, no murmur, rub or gallop

## 2021-06-13 NOTE — PROGRESS NOTES
Assessment:     1. Acute otitis media  cefdinir (OMNICEF) 300 MG capsule          Plan:     We will treat acute otitis media with a course of cefdinir.  Recommend Tylenol or ibuprofen as needed for fever or discomfort.  Recommend following up if symptoms are getting worse or not improving.    Subjective:       37 y.o. male presents for evaluation of a 5 day history of fatigue, headache, and nasal congestion.  Over the last 3 days he has developed some body aches and chills and a cough and worsening left ear pain.  He has not been short of breath.  He denies much of a sore throat.  He has not tried anything for his symptoms.    The following portions of the patient's history were reviewed and updated as appropriate: allergies, current medications, past family history, past medical history, past social history, past surgical history and problem list.    Review of Systems  A 12 point comprehensive review of systems was negative except as noted.     Objective:        Vitals:    10/23/17 1348   BP: 138/76   Pulse:    Resp:    Temp:    SpO2:      General Appearance:    Alert, pleasant, cooperative, mildly ill-appearing, no distress, appears stated age   Head:    Normocephalic, without obvious abnormality, atraumatic   Eyes:    Conjunctiva/corneas clear   Ears:   Left tympanic membrane is erythematous and bulging with purulent effusion present.   Nose:   Nares normal, septum midline, mucosa normal, no drainage    or sinus tenderness   Throat:   Lips, mucosa, and tongue normal; teeth and gums normal.  No tonsilar hypertrophy or exudate.   Neck:    Cardiovascular:   Supple, symmetrical, trachea midline, no adenopathy;     thyroid:  no enlargement/tenderness/nodules  Regular rate and rhythm, no murmurs, rubs, or gallops.   Lungs:     Clear to auscultation bilaterally without wheezes, rales, or rhonchi, respirations unlabored                          This note has been dictated using voice recognition software. Any grammatical  or context distortions are unintentional and inherent to the software

## 2021-06-14 NOTE — PROGRESS NOTES
Date of Service: 2017     Date last seen by Dr. Ramos:  2017    PCP: Aidan Comer MD    Impression:  1.  Bilateral leg swelling  2.  Venous hypertension of the legs bilaterally  3.  Valgus foot deformity-bilateral  4.  Morbid obesity     Plan:  1.  Questions were answered.  2.  Discussed contribution of weight to swelling.  He agrees to rewriting of  bariatric medicine referral to help with this.  Redone.    3.  Insoles for foot deformities and decrease stress on skeletal system so he can exercise. Still has not gotten.    4.  Compression stockings written for.  Still needs to get.   5.  Patient will follow up in 3 months or when needed.  He promised to follow through as requested and understands there is very little I can do for him if he does not.        Time spent with patient 15 minutes with greater than 50% time in consultation, education and coordination of care.     ---------------------------------------------------------------------------------------------------------------------  Chief Complaint: Bilateral leg swelling     History of Present Illness:     Lawson Daniels returns to the [facility] for his bilateral leg swelling felt to be due to venous insufficiency with dependent swelling.  This was exacerbated by valgus foot deformity and morbid obesity.  Bariatric referral was recommended.  In addition, insoles were ordered along with compression stockings.  He is now here for his follow-up.  Felt the bariatrics is also important as he has symptoms consistent with obstructive sleep apnea.  He did not follow through with any of the recommendations.  Thus, things have not changed.   There has been no new numbness, tingling or weakness.  There have been no new masses, rashes, or swellings of any other joints. There has been no new decrease in appetite, unexplained weight loss, abdominal bloating and bowel or bladder changes      Past Medical History:   Diagnosis Date      Jaw fracture 1993     Morbid obesity with BMI of 40.0-44.9, adult      PAC (premature atrial contraction) 2/24/2016     Pes Planus      Radius head fracture December 2008    left       Past Surgical History:   Procedure Laterality Date     LAPAROSCOPIC CHOLECYSTECTOMY  May 2006    [place], [place]- [facility]     LASIK Bilateral 2015     VASECTOMY  4/30/2010    Dr Pickering at [facility]         Current Outpatient Prescriptions:      fexofenadine (ALLEGRA) 180 MG tablet, Take 180 mg by mouth., Disp: , Rfl:      DEXTROMETHORPHAN HBR (VICKS DAYQUIL COUGH ORAL), Take by mouth., Disp: , Rfl:     Allergies   Allergen Reactions     Demerol [Meperidine] Nausea And Vomiting     Sumatriptan Succinate      Annotation: heartburn, dizziness, upset stomach, would not want to use it again       Tramadol Nausea And Vomiting     Annotation: when broke toe, given tramadol, had nausea and vomiting from that         Social History     Social History     Marital status:      Spouse name: Mitchel     Number of children: 2     Years of education: N/A     Occupational History      [employer]     Social History Main Topics     Smoking status: Former Smoker     Packs/day: 2.00     Years: 15.00     Types: Cigarettes     Start date: 8/23/1995     Quit date: 8/23/2006     Smokeless tobacco: Never Used     Alcohol use 1.2 oz/week     2 Cans of beer per week     Drug use: No     Sexual activity: Yes     Partners: Female     Birth control/ protection: Surgical     Other Topics Concern     Not on file     Social History Narrative    .  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       Family History   Problem Relation Age of Onset     Breast cancer Mother      Myasthenia gravis Father      Breast cancer Maternal Grandmother      ALS Paternal Grandmother      No Medical Problems Son      No Medical Problems Daughter        Review of Systems:  Lawson Daniels no new numbess, tingling or weakness,  redness or rashes, fevers, new masses, abdominal bloating or discomfort, unexplained weight loss, increased pain, new ulcers, shortness of breath and chest pain  Full 12 point review of systems was completed.    Imaging:    No results found.    Labs:  Lab Results   Component Value Date    SEDRATE 3 09/12/2016         Lab Results   Component Value Date    CRP 0.4 09/12/2016           Lab Results   Component Value Date    CREATININE 0.87 09/12/2016      No results found for: HGBA1C        Lab Results   Component Value Date    BUN 15 09/12/2016              Lab Results   Component Value Date    ALBUMIN 4.2 09/12/2016       No results found for: [id number]    Lab Results   Component Value Date    TSH 1.11 08/15/2016     Lab Results   Component Value Date    WBC 10.7 10/31/2016    HGB 15.2 09/12/2016    HCT 43.9 09/12/2016    MCV 89 09/12/2016     09/12/2016         Physical Exam:  Vitals:    11/30/17 1515   BP: 142/78   Pulse: 88   Resp: 18   Temp: 98.9  F (37.2  C)      BMI 46.72    Circumferential measures:    Vasc Edema 8/23/2017 11/30/2017   Right just above MTP 26.5 26.0   Right Ankle 26.3 25.2   Right Widest Calf 49.8 49.0   Right Thigh Up 10cm 61 60.0   Left - just above MTP 27 26.5   Left Ankle 24.8 24.0   Left Widest Calf 48 48.5   Left Thigh Up 10cm 60 59.2     Measures stable      General:  37 y.o. male in no apparent distress.  Alert and oriented x 3.  Cooperative. Affect normal.     Musculoskeletal:  Normal range of motion in  ankles bilaterally.  There is no active joint synovitis, erythema, swelling or joint laxity.  Valgus foot deformity bilaterally with flat feet.       Neurological:  Strength testing is normal in  ankle dorsiflexion and great toe extension bilaterally.       Vascular: Dorsalis pedis and posterior tibialis pulses are strong and equal bilaterally. There are no significant telangietasias, medial ankle venous flares, venous varicosities  and spider veins.     Integumentary: Skin  of the legs is uniformly warm and dry and with negative Stemmer's Sign. Slight hemosiderin deposition bilateral distal anterior calves.   Nails are normal      Laura Ramos MD, ABWMS, FACCWS, [facility]  Medical Director Wound Care and Lymphedema  [facility]  [phone]

## 2021-06-15 NOTE — PROGRESS NOTES
Outpatient Bariatric Medicine Consultation on 1/12/2018, 2:00 PM.    Indication: Medical Bariatric Consultation to Precede Bariatric Surgery  Primary Provider: Aidan Comer MD  Requesting Physician: TBD  Type of Bariatric Surgery: TBD but would be a candidate for either a sleeve gastrectomy or layton en y gastric bypass    Impression Lawson Daniels is a 37 y.o. year old male with  has a past medical history of Abnormal ECG; Fatty liver; migraines; Jaw fracture (1993); Lower leg edema; Morbid obesity with BMI of 40.0-44.9, adult; PAC (premature atrial contraction) (2/24/2016); Pes Planus; and Radius head fracture (December 2008).  Poor functional capacity and musculoskeletal disability due to morbid obesity which satisfies NIH criteria for bariatric surgery. His  Body mass index is 47.6 kg/(m^2)..       His edema issues, blood pressure elevation and likely frequency of headaches should improve/respond to meaningful weight loss.   His blood pressure was moderately elevated today but he's not on any regular medications. Weight loss, diet change and avoiding energy drinks should help reduce his blood pressure.    He's motivated for change and after hearing about the surgical tool option, has decided to pursue surgical weight loss and will view our online seminar prior to his next appointments.    He quite smoking over 10 years ago for his children and has no urge to restart.     He was referred to our clinic by Dr. Ramos.  Lawson Daniels hopes to achieve improved health and fitness/aerobic capacity and less edema issues after bariatric surgery.    PLAN of ACTION:  1. Welcome to the surgical weight loss program, watch the Online video series at [email or web address] (scroll down).   2. Intake labs and referrals placed today.  3. Follow up as planned with your sleep study as you do have a high risk for sleep apnea, would require at least one month of treatment if needed prior to surgery.  4. Attend one  support group meeting at least.  5. Check with Freda regarding structured weight loss needs for your insurance.  6. Continue working out, phase out your sweetened beverages.    BARIATRIC RECOMMENDATIONS  Bariatric therapy is indicated for Lawson as a means of modifying his obesity related co-morbidities.  Therapeutic lifestyle changes have not lead to significant and or durable weight loss.  Surgical bariatric therapy is most likely to induce significant weight loss, promote long-term weight maintenance and lead to clinical improvement and/or resolution of his weight related co-morbidities.   -Medical Nutrition Therapy is indicated including comprehensive guidance of nutrition and lifestyle management.  -A Bariatric Psychological Assessment and clearance is indicated.  -Screening Bariatric Laboratory studies are indicated.  -Currency of Routine Healthcare Maintenance is indicated.  -Physical Activity Guidance was provided. Follow up of recommendations will be provided.    PERIOPERATIVE RECOMMENDATIONS  CARDIAC: Cardiac consultation and clearance will be required of patients with significant cardiac disease and/or multiple cardiac risk factors.  PULMONARY: Pre-operative therapy with CPAP/BIPAP is indicated for a minimum of one month for patients with sleep apnea. Complete tobacco abstinence for two months pre-operatively and indefinitely thereafter is required.   RENAL: Diuretics will be discontinued 2 weeks before surgery at the time of liquid diet if the patient is on them at that time.  ENDOCRINE: Optimizing perioperative glycemic control is indicated. Our goal is an AIC of 8 or less at the time of surgery for optimal healing. Patients using insulin will be referred to a [facility] endocrinologist for perioperative insulin management.  GASTROINTESTINAL: Evaluation of the esophagus and stomach by EGD and/or UGI series will be considered in patients with severe GERD, previous weight loss surgery, or other  indication.  GYNECOLOGIC: For patients on Estrogen- Estrogen will be discontinued 4 weeks prior to surgery and resumed 4 weeks after surgery unless otherwise advised. Reliable contraception is required post-operatively for 1 year for women of childbearing age. DEPOT PROVERA is contraindicated due to its association with weight gain. Post-operative birth control plan is na  MUSCULOSKELETAL/RHEUMATOLOGIC: NSAIDS are contraindicated following surgery and lifelong abstinence is indicated. When indicated for cardioprotection or otherwise, patients should use an enteric coated ASA and concomitant proton pump inhibitor.  HEMATOLOGIC: Risks of deep venous thromboembolism have been assessed. Patients with history of DVT/PE or current anti-coagulation will be placed on the High Risk DVT Prophylaxis Protocol. Objections (if any) to receiving blood products if necessary have been documented.  DENTAL: Reasonable and functional dentition is indicated in order to chew food to applesauce consistency post-operatively.  NUTRITIONAL: Pre and post-operative nutritional and lifestyle modification guidance is indicated. Pre-operative weight reduction can reduce liver volume, improving technical aspects of surgery.    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   He has had several past supervised and unsupervised weight loss attempts  The most weight lost was: 60 lbs with biking and running, self guided. Worked up to a half marathon.  Unfortunately there was not durable weight maintenance.  History of bulimia, anorexia, or binge eating disorder? no  If Present has eating disorder been in remission at least 3 years? na  Night time eating? No.    Dietary History  Meals per day: 3  Snacks: one AM and evening.  Typical Snack: hard boiled eggs and popcorn.  Who does the grocery shopping? shared  Who does the cooking? Shared, mostly wife. He'll  "grill.  A typical meal includes: supper is hotdish.  Regular Pop: once energy MTN DEW.  Juice: no  Caffeine: mtn dew one daily. occ frozen coffee  Amount of restaurant eating per week: 3-4  Eating a the table with the TV off? Table with family. At work sometimes eats at desk.    Physical Activity Patterns  Current physical activity routine includes: exercise bike 30 minutes nightly.     Limitations from being physically active on a regular basis includes: no    He describes his general health as: Fair    Past Medical History  Past Medical History:   Diagnosis Date     Abnormal ECG     PACs     Fatty liver      Hx of migraines     monthly     Jaw fracture 1993     Lower leg edema     compression socks. sees Dr. Ramos     Morbid obesity with BMI of 40.0-44.9, adult      PAC (premature atrial contraction) 2/24/2016     Pes Planus      Radius head fracture December 2008    left     Patient Active Problem List   Diagnosis     Morbid obesity with BMI of 40.0-44.9, adult     Acquired bilateral flat feet     Allergic rhinitis     PAC (premature atrial contraction)     Edema     Venous hypertension of lower extremity, bilateral     Valgus deformity of both feet     Snoring     Venous insufficiency of both lower extremities     HTN: not usually  Dyslipidemia: no  URIEL: upcoming study  Obesity Hypoventilation: NO  DM2: no DM1: no DX: no Most recent AIC: no  Neuropathy: no  Nephropathy: no  Retinopathy: no  IFG or \"pre-DM\": no  MI: no  CVA:no  CHF: no  Previous cardiac testing includes: no  Cancers: no  Kidney Disease: no  DVT: no  PE: no  Colitis: no  Crohn's: no  IBS: no  PUD: no  Fatty Liver: yes, on CT scan 10/16  Abnormal LFTs: no  Hepatitis: no  Asthma: no  Bronchitis: no  Pneumonia: no  Other Lung Problems: no  Back Pain:no  DDD: no  Gout: no  Fibromyalgia: no  Severe Headaches: occ migraine  Seizures: no If so, last seizure: no  Pseudotumor: no  PCOS: no  Menstrual Irregularity: na  Menorrhagia: na  Infertility: " no  Thyroid problems: no  Thyroid medications: no  Glaucoma: no  HIV positive: NO  MRSA/VRE history: na  History of Blood transfusion: no  Anemia: no    Medications   Current Outpatient Prescriptions   Medication Sig Dispense Refill     fexofenadine (ALLEGRA) 180 MG tablet Take 180 mg by mouth.       multivit-mins-ferrous gluconat 9 mg iron/15 mL Liqd Take by mouth.       No current facility-administered medications for this visit.      Allergies   Demerol [meperidine]; Sumatriptan succinate; and Tramadol  Past Surgical History  Past Surgical History:   Procedure Laterality Date     CHOLECYSTECTOMY  May 2006    stones.     LAPAROSCOPIC CHOLECYSTECTOMY  May 2006    [place], [place]- [facility]     LASIK Bilateral 2015     VASECTOMY  4/30/2010    Dr Pickering at [facility]     History of problems with anesthesia: Tramadol seems to cause him severe naueas/vomiting.  History of Malignant Hyperthermia: NO    Gyne  Family History  family history includes ALS in his paternal grandmother; Breast cancer in his maternal grandmother and mother; Myasthenia gravis in his father; No Medical Problems in his daughter and son.    Social History  Social History     Social History     Marital status:      Spouse name: Mitchel     Number of children: 2     Years of education: N/A     Occupational History      [employer]     Social History Main Topics     Smoking status: Former Smoker     Packs/day: 2.00     Years: 15.00     Types: Cigarettes     Start date: 8/23/1995     Quit date: 8/23/2006     Smokeless tobacco: Never Used     Alcohol use 0.0 - 1.2 oz/week     0 - 2 Cans of beer per week      Comment: now and then     Drug use: No     Sexual activity: Yes     Partners: Female     Birth control/ protection: Surgical      Comment: 2 kids     Other Topics Concern     None     Social History Narrative    .  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     Work Status:  for  [employer].      Addiction History  Current or Past history of alcohol or substance abuse: no  Last used: no  Chemical Dependency Treatment History: no  Chemicals: na  Informed about need to be tobacco free 2 months before and indefinitely after surgery due to risk of Marginal Ulcers.    Psychiatric History  Diagnoses: no  Treated by: no  Psychiatric Hospitalizations: no  Suicide attempts: no  Panic attacks: no  History of Abuse: no    Palliative Medicine History  Involvement in a pain clinic: no    Dental History  Missing teeth: no  Pending Dental Work: no  Regular Dental Visits: yes  Dentures/Partials: no              ROS:    Snoring: upcoming PSG  Witnessed Apneas upcoming PSG  PND no  [place] Score: 3  STOP BAN  General  Fatigue: some  Sleep Quality: up a lot in the night to urinate.  HEENT  Visual changes: lasik.  Cardiovascular  Murmur: no  Elevated BP: some  Chest Pain with Exertion: no  Dyspnea with Exertion: no  Palpitations: in the past had PACs  Lower Extremity Edema: some, uses compression  Syncope: no  Blood Clotting Problems:  no  Pulmonary  Shortness of breath at rest: no  Wheezing: no  CPAP use: pending evaluation  Gastrointestinal  Trouble swallowing:no  Heartburn: in the past  HX UGI/EGD: around time of his Brittany. Normal.  Abdominal pain: no  Hematochezia: no  Urologic  Hesitancy: no  Urgency: no  Bloody Urine:no  Genitourinary  ED: no  Menorrhagia: na  Dysmenorrhea: na  Neurologic  Severe headache:once monthly: takes ice pack and rests often.  Paresthesias: no  Psychiatric  Moods Stable: yes  Hallucinations: no  Rheumatologic  Myalgias: no  Arthralgias: no  Endocrine  Polydipsia: no  Polyuria: no  Galactorrhea: no  Heat intolerance: yes  Hirsutism: no  Musculoskeletal  Joint pain:no  Falls: no  Use of cane, crutch or motorized scooter: na  Hematologic  Abnormal Bleeding or Clotting: no  Dermatologic  Skin Tags: yes on necline  Striae: yes  Furuncles/boils: no  Acne:  "no  Intertrigo: no  Lower Leg ulcers: purple tiffanie on right lawson, normal per Dr. Ramos.      Physical Exam  Vitals: BP (!) 155/94 (Patient Site: Right Arm, Patient Position: Sitting, Cuff Size: Thigh)  Pulse 75  Ht 5' 11.2\" (1.808 m)  Wt (!) 343 lb 3.2 oz (155.7 kg)  SpO2 95%  BMI 47.6 kg/m2  Height:   Ht Readings from Last 1 Encounters:   01/12/18 5' 11.2\" (1.808 m)     Weight:   Wt Readings from Last 1 Encounters:   01/12/18 (!) 343 lb 3.2 oz (155.7 kg)     Height: 5' 11.2\" (1.808 m) (1/12/2018  1:09 PM)  Initial Weight: 343.2 lbs (1/12/2018  1:09 PM)  Weight: 343 lb 3.2 oz (155.7 kg) (1/12/2018  1:09 PM)  Weight loss from initial: 0 (1/12/2018  1:09 PM)  % Weight loss: 0 % (1/12/2018  1:09 PM)  BMI (Calculated): 47.6 (1/12/2018  1:09 PM)  SpO2: 95 % (1/12/2018  1:09 PM)  Heart Rate (/min): 75 (1/12/2018  1:09 PM)  BP (mmHg): 155/94 (1/12/2018  1:09 PM)  Waist Circumference (In): 58.25 Inches (1/12/2018  1:09 PM)  Hip Circumference (In): 50.75 Inches (1/12/2018  1:09 PM)  Body mass index is 47.6 kg/(m^2).    General Appearance  No acute distress. Obesity: class III  Alert: yes  Sleepy: no    HEENT  PERRLA, EOMI  Neck  Flower: no nodules, several skin tags, No carotid bruits  Airway: mallampati 3  Cardiovascular  Rhythm regular Rate Regular  Murmur: none  Pulmonary  [place] Score: 3  Lungs clear to ascultation  Abdomen  No rashes.   Post surgical Scars: lap zohreh scars well healed. Non tender to palpation.  Extremities:  Pitting edema: compression socks in place  Palpable distal pulses: 2 plus  Varicose veins: none visible but compression socks left on.  Neurologic  Tremors: none.  Alert and oriented with intact cognition.   Speech fluent and cranial nerves are grossly intact.  Psychiatric  Thought Content Organized  Mood appears stable  Endocrine  Moon Facies: NO  Dorsal Thoracic Prominence: NO  Skin tags: yes.  Acanthosis nigricans: no  Dermatologic  Intertrigo: none. No pallor or jaundice.    Intake " Photos:  No images are attached to the encounter.    LABS on File:    No results found for: 68390  No results found for: 7597    WBC   Date Value Ref Range Status   10/31/2016 10.7 4.0 - 11.0 thou/uL Final   08/05/2014 10.0 4.0 - 11.0 thou/uL Final     Hemoglobin   Date Value Ref Range Status   09/12/2016 15.2 14.0 - 18.0 g/dL Final   02/24/2016 15.5 14.0 - 18.0 g/dL Final     Hematocrit   Date Value Ref Range Status   09/12/2016 43.9 40.0 - 54.0 % Final     MCV   Date Value Ref Range Status   09/12/2016 89 80 - 100 fL Final     Platelets   Date Value Ref Range Status   09/12/2016 283 140 - 440 thou/uL Final         AST   Date Value Ref Range Status   09/12/2016 24 0 - 40 U/L Final     ALT   Date Value Ref Range Status   09/12/2016 51 (H) 0 - 45 U/L Final     Alkaline Phosphatase   Date Value Ref Range Status   09/12/2016 70 45 - 120 U/L Final     Bilirubin, Total   Date Value Ref Range Status   09/12/2016 0.7 0.0 - 1.0 mg/dL Final     Lipase   Date Value Ref Range Status   09/12/2016 37 0 - 52 U/L Final       No results found for: INR    No results found for: HGBA1C    TSH   Date Value Ref Range Status   08/15/2016 1.11 0.30 - 5.00 uIU/mL Final     No results found for: PTH  No results found for: [id number]  No results found for: FERRITIN    Triglycerides   Date Value Ref Range Status   11/16/2015 441 (H) <=149 mg/dL Final     Cholesterol   Date Value Ref Range Status   11/16/2015 187 <=199 mg/dL Final     HDL Cholesterol   Date Value Ref Range Status   11/16/2015 28 (L) >=40 mg/dL Final       No results found for: FOLATE  No components found for: THIAMINE  No results found for: TESTOSTERONE  No results found for: [id number]        Counseled on what to expect regarding the post operative dietary recommendations which include:  -eating 3 meals daily  -eating protein first, getting >60gm protein daily 80gm if duodenal switch  -eating slowly, chewing food well  -avoiding/limiting calorie containing  beverages  -drinking water 15-30 minutes before or after meals  -choosing wheat, not white with breads, crackers, pastas, dinora, bagels, tortillas, rice  -limiting restaurant or cafeteria eating to twice a week or less    We discussed the importance of restorative sleep and stress management in maintaining a healthy weight.  We discussed the National Weight Control Registry healthy weight maintenance strategies and ways to optimize metabolism.  We discussed the importance of physical activity including cardiovascular and strength training in maintaining a healthier weight.  We discussed the importance of lifelong vitamin supplementation and lifelong follow-up.    Lawson Daniels was reminded that, postoperatively,  to avoid marginal ulcers he should avoid tobacco at all, alcohol in excess, caffeine in excess, and NSAIDS (unless indicated for cardioprotection or othewise and opposed by a PPI).     He was also reminded on the lifelong need for vitamin supplementation after bariatric surgery due to post operative malabsorption to maintain adequate nutrition and health.    He was reminded that after bariatric surgery alcohol will affect him differently and he should not drive after consuming even one alcoholic beverage.  Time spent 60 minutes face-to-face with over 50% of the time spent counseling about how excess weight may affect him health, improving dietary hygiene and habits, details of the bariatric surgery pathway and our expectations and requirements prior to any surgery, and coordination of treatment plan.      Answers for HPI/ROS submitted by the patient on 1/12/2018   BARIATRIC NEW PATIENT  Interested in Surgery?: Yes  Pierce About?: Specialty Care Doctor  Barriers to learning:: No  Attended Seminar?: No  PCP:: None  Specialist:: None  Mental Health Provider:: [id number]  Present Ht:: 72  Present Wt:: 340  Age Overweight:: 24  Age 100lbs:: 30  Wt 20yrs:: 190  Wt 5yrs ago:: 310  # of Wt Loss Efforts:: 1  Prescribed  Wt Loss Meds?: No  OTC Wt Loss Meds?: No  Surgeon Choice:: undecided  Procedure Choice:: Not sure or no preference  Program1:: Exercise  Start Date1:: 4/30/2014  End Date1:: 11/16/2015  Total Months1:: 19  Starting Wt1:: 320  Ending Wt1:: 260  Pounds Lost1:: 60

## 2021-06-15 NOTE — PROGRESS NOTES
I met with Lawson while he was in clinic for a bariatric consult with Dr. Castrejon.  He was initially coming in for non surgical counseling, however after meeting with Dr. Castrejon, it was decided he would make a good candidate for surgery.  He has [employer] for insurance and while he was here I contacted them and it was determined that he does not have any plan exclusions and he will need 3 months of SWL.  We discussed his need to be cleared by our psych and nutritions teams and complete the needs list provided to him at his visit before scheduling a surgical consult. He is very motivated to go ahead.

## 2021-06-15 NOTE — PROGRESS NOTES
Dear Dr. Laura Ramos Md  [address] [place] [address]    Thank you for the opportunity to participate in the care of Mr. Lawson Daniels.    He is a 37 y.o. male who comes to the clinic with a chief complaint of sleep disturbance.  While the patient denies any episodes of excessive daytime sleepiness he has been told by his wife that he has significant pauses in his breathing during sleep followed by loud snoring.  The patient's review of system is otherwise unremarkable.     Ideal Sleep-Wake Cycle(devoid of societal pressure):    Patient would try to initiate sleep at around midnight with a sleep latency of 5-10 minutes. The patient would have 1-4 nocturnal awakening. Final wake up time is around 6 AM.    Past Medical History  Past Medical History:   Diagnosis Date     Abnormal ECG     PACs     Fatty liver      Hx of migraines     monthly     Jaw fracture 1993     Lower leg edema     compression socks. sees Dr. Ramos     Morbid obesity with BMI of 40.0-44.9, adult      PAC (premature atrial contraction) 2/24/2016     Pes Planus      Radius head fracture December 2008    left        Past Surgical History  Past Surgical History:   Procedure Laterality Date     CHOLECYSTECTOMY  May 2006    stones.     LAPAROSCOPIC CHOLECYSTECTOMY  May 2006    [place], [place]- [facility]     LASIK Bilateral 2015     VASECTOMY  4/30/2010    Dr Pickering at [facility]        [facility]  Current Outpatient Prescriptions   Medication Sig Dispense Refill     fexofenadine (ALLEGRA) 180 MG tablet Take 180 mg by mouth.       multivit-mins-ferrous gluconat 9 mg iron/15 mL Liqd Take by mouth.       No current facility-administered medications for this visit.         Allergies  Demerol [meperidine]; Sumatriptan succinate; and Tramadol     Social History  Social History     Social History     Marital status:      Spouse name: Mitchel     Number of children: 2     Years of education: N/A     Occupational  History      [employer]     Social History Main Topics     Smoking status: Former Smoker     Packs/day: 2.00     Years: 15.00     Types: Cigarettes     Start date: 8/23/1995     Quit date: 8/23/2006     Smokeless tobacco: Never Used     Alcohol use 0.0 - 1.2 oz/week     0 - 2 Cans of beer per week      Comment: now and then     Drug use: No     Sexual activity: Yes     Partners: Female     Birth control/ protection: Surgical      Comment: 2 kids     Other Topics Concern     Not on file     Social History Narrative    .  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        Family History  Family History   Problem Relation Age of Onset     Breast cancer Mother      Myasthenia gravis Father      Snoring Father      Breast cancer Maternal Grandmother      ALS Paternal Grandmother      No Medical Problems Son      No Medical Problems Daughter         Review of Systems:  Constitutional: Negative except as noted in HPI.   Eyes: Negative except as noted in HPI.   ENT: Negative except as noted in HPI.   Cardiovascular: Negative except as noted in HPI.   Respiratory: Negative except as noted in HPI.   Gastrointestinal: Negative except as noted in HPI.   Genitourinary: Negative except as noted in HPI.   Musculoskeletal: Negative except as noted in HPI.   Integumentary: Negative except as noted in HPI.   Neurological: Negative except as noted in HPI.   Psychiatric: Negative except as noted in HPI.   Endocrine: Negative except as noted in HPI.   Hematologic/Lymphatic: Negative except as noted in HPI.      STOP BANG 1/19/2018   Do you snore loudly (louder than talking or loud enough to be heard through closed doors)? 1   Do you often feel tired, fatigued, or sleepy during daytime? 0   Has anyone observed you stop breathing in your sleep? 1   Do you have or are you being treated for high blood pressure? 1   BMI more than 35 kg/m2 1   Age over 50 years old? 0   Neck circumference greater than 16  "inches? 1   Gender male? 1   Total Score 6   Epworths Sleepiness Scale 1/19/2018   Sitting and reading 0   Watching TV 0   Sitting, inactive in a public place (e.g. a theatre or a meeting) 0   As a passenger in a car for an hour without a break 0   Lying down to rest in the afternoon when circumstances permit 1   Sitting and talking to someone 0   Sitting quietly after a lunch without alcohol 0   In a car, while stopped for a few minutes in traffic 0   Total score 1   Rooming 1/19/2018   Usual bedtime 11pm   Sleep Latency 5-10 minutes   Awakenings 1-4 times   Wake Up Time 5:45am   Weekends varies   Energy Drinks 0   Coffee 1 cup qd   Cola 0   Difficulty falling asleep No   Difficulty staying asleep Yes   Excessive daytime tiredness Yes   Excessive daytime sleepiness No   Dozing off while driving No   Shift Worker No   Sleep Walking? No   Sleep Talking? Yes   Kicking or punching? Yes   Restless legs symptoms No       Physical Exam:  /78  Pulse 83  Ht 5' 11\" (1.803 m)  Wt (!) 337 lb (152.9 kg)  SpO2 96%  BMI 47 kg/m2  BMI:Body mass index is 47 kg/(m^2).   GEN: NAD, morbidly obese  Head: Normocephalic.  EYES: PERRLA, EOMI  ENT: Oropharynx is clear, mallampatti class 4+ airway.   Nasal mucosa is moist without erythema  Neck : Thyroid is within normal limits. Neck size 20 inches  CV: Regular rate and rhythm, S1 & S2 positive.  LUNGS: Bilateral breathsounds heard.   ABDOMEN: Positive bowel sounds in all quadrants, soft, no rebound or guarding  MUSCULOSKELETAL: Bilateral 2+ leg swelling  SKIN: warm, dry, no rashes  Neurological: Alert, oriented to time, place, and person.  Psych: normal mood, normal affect     Labs/Studies:     Lab Results   Component Value Date    WBC 9.4 01/12/2018    HGB 15.3 01/12/2018    HCT 45.1 01/12/2018    MCV 89 01/12/2018     01/12/2018         Chemistry        Component Value Date/Time     01/12/2018 1523    K 4.2 01/12/2018 1523     (H) 01/12/2018 1523    CO2 23 " 01/12/2018 1523    BUN 20 01/12/2018 1523    CREATININE 0.84 01/12/2018 1523    GLU 98 01/12/2018 1523        Component Value Date/Time    CALCIUM 9.7 01/12/2018 1523    ALKPHOS 80 01/12/2018 1523    AST 33 01/12/2018 1523    ALT 66 (H) 01/12/2018 1523    BILITOT 0.6 01/12/2018 1523            Lab Results   Component Value Date    FERRITIN 236 01/12/2018     Lab Results   Component Value Date    TSH 1.77 01/12/2018         Assessment and Plan:  In summary Lawson Daniels is a 37 y.o. year old male here for sleep disturbance.  1.  Suspected sleep apnea   Mr. Lawson Daniels has high risk for obstructive sleep apnea based on the history of witnessed apnea, snoring and a crowded airway. I educated the patient on the underlying pathophysiology of obstructive sleep apnea. We reviewed the risks associated with sleep apnea, including increased cardiovascular risk and overall death. We talked about treatments briefly. I recommend getting an split-night nocturnal polysomnography. The patient should return to the clinic to discuss results and treatment option in a patient-centered approach.  2.  Snoring  3.  Other sleep disturbance  4.  Obesity    Patient verbalized understanding of these issues, agrees with the plan and all questions were answered today. Patient was given an opportuntity to voice any other symptoms or concerns not listed above. Patient did not have any other symptoms or concerns.      Patient told to return in one week after the sleep study is interpreted. Patient instructed to stop at  to schedule appointment before leaving today.     Joseph Alaniz DO  Board Certified in Internal Medicine and Sleep Medicine  [facility].    (Note created with Dragon voice recognition and unintended spelling errors and word substitutions may occur)

## 2021-06-16 PROBLEM — Q66.6 VALGUS DEFORMITY OF BOTH FEET: Status: ACTIVE | Noted: 2017-08-23

## 2021-06-16 PROBLEM — I87.2 VENOUS INSUFFICIENCY OF BOTH LOWER EXTREMITIES: Status: ACTIVE | Noted: 2017-11-30

## 2021-06-16 PROBLEM — R06.83 SNORING: Status: ACTIVE | Noted: 2017-11-30

## 2021-06-16 PROBLEM — E66.01 MORBID OBESITY DUE TO EXCESS CALORIES (H): Status: ACTIVE | Noted: 2019-05-13

## 2021-06-16 PROBLEM — I87.303 VENOUS HYPERTENSION OF LOWER EXTREMITY, BILATERAL: Status: ACTIVE | Noted: 2017-08-23

## 2021-06-16 NOTE — PROGRESS NOTES
Attended support group. Workflow updated.    Fabio Kuhn [facility]  P: [phone]  F: [phone]

## 2021-06-16 NOTE — PROGRESS NOTES
Order for Durable Medical Equipment was processed and equipment ordered.   DME provider: [facility]  Date Faxed: 03/06/2018  Ordering Provider: Dr. Alaniz  Equipment ordered: Cpap

## 2021-06-16 NOTE — PROGRESS NOTES
Bilateral swelling down with weight down 25lbs in 2 months. Does wear knee high compression everyday.

## 2021-06-16 NOTE — PROGRESS NOTES
Initial Structured Weight Loss Supervised Diet Evaluation     Assessment:  Pt. is a 38 y.o. male is being seen today for initial RD nutritional evaluation. Pt. has been unsuccessful with non-surgical weight loss methods and is interested in bariatric surgery. Today we reviewed current eating habits and level of physical activity, and instructed on the changes that are required for successful bariatric outcomes.    +pt missed hour initial appt - only 30minutes today  Workflow review: Pt is starting psych, pt has completed labs and has attended support group   Weight goal: At or below initial weight     Pt's Initial Weight: 343.2 lbs  Weight: 318 lb (144.2 kg)  Weight loss from initial: 25.2  % Weight loss: 7.34 %  BMI: Body mass index is 44.1 kg/(m^2).    Estimated RMR ([place]-St Jeor equation): 2269 calories    Food allergies, intolerances, and Pentec[employer]al customs: none    Vitamins   Educated on post-op vitamin regimen: Multi Vit + iron 2x/day, calcium citrate 500-600 mg 2x/day, 2154-0234 mcg of Sublingual B-12 daily, and 5000 IU Vitamin D3 daily.    Biggest struggle with weight loss: portions; choices - loves pasta and pizza     Diet/Weight History  Previously lost weight by running 30-40miles/week (down to 260lb) - broke toe     Who does the grocery shopping for your household? Wife  Who prepares your meals at home? Wife  -lies with wife and 2 children (8, 11) - all know     -pt watching portions and eating more slowly (chewing well)   Diet Recall/Time:   Breakfast: 1 caff drink and greek yogurt (12g)   Am Snack: 10 -greek yogurt (12g)   Lunch: fist portions (eating slowly) Pro/CHO - sometimes vegs   Pm snack:2/3pm - KIND bar   Dinner: Pro/Veg/CHO   HS Snack: Greek yogurt (12g)     Eating 4 large meals daily - more CHO    Typical Snacks: above   Fats used at home: olive oil     Meals per week away from home: rare     Fried Foods: 1 times per week - previously was eating more frequently     Recommended limiting  eating out to no more than 2x/week.  Patient and I reviewed the importance of eating three consistent meals per day; as well as meal timing to be spaced 4-5 hours apart.  Snack choices: 100-150 calories (1-2x/day if physically hungry), incorporating a fruit/vegetable w/ protein source.    Portion Sizes problematic? Yes per patient/diet recall  Encouraged slowing meal times down, 20-30 minutes, chewing to applesauce consistency.   To aid in proper portion control and slow meal time down discussed consuming meals off smaller plates, use toddler/children utensils and set utensils down after each bite.    Protein, vegetables/fruits, carbohydrates:   Reviewed lean protein sources today. Recommended consuming 15-20gm protein at 3 meals daily    Beverages (Type/Oz. per day)  Water: 3-4L   Coffee: 8oz starbucks bottles/day  Tea: none  Milk: loves milk but rarely drinking   Regular soda: none  Diet soda: none  Juice: none  Harjinder-Aid/lemonade/etc: none  Alcohol: none  2-3 energy drinks    Discussed the importance of adequate hydration after surgery and the goal of 64+ oz. of fluid daily.   The patient understands the importance of avoiding all carbonated, caffeinated and sweetened drinks; instead choosing 64 oz. plain water.    Fluid-meal separation:  The patient and I reviewed the anatomy of the bypass and why  fluids from a meal is so important.    Exercise  Biking currently on stationary bike 5-7miles 3-4X.week     Pt. s understands that 30-60 minutes of daily activity is an important part of bariatric surgery success.   Encouraged pt. to incorporate upper body strength training exercise, even if its lifting soup cans while watching TV at night, doing pushups/sit-ups, and abdominal work.    PES statement:   1. (NI-1.3)Excessive energy intake related to Food and nutrition related knowledge deficit concerning excessive energy/oral intake as evidenced by Intake of high caloric density foods/beverages (juice, soda,  alcohol) at meals and/or snacks; large portions; frequent grazing; Estimated intake that exceeds estimated daily energy intake; Binge eating patterns; Frequent excessive fast food or restaurant intake; and BMI 44.10    2. (NC-3.3.5) Obese, class III, BMI ?40 related to physical inactivity as evidenced by Infrequent, low-duration and or low intensity physical activity; and Large amounts of sedentary activities, no structured exercise regimen.     Intervention  Discussion:    1. Educated pt on the [place] to Bariatric Success handout.  2. Reviewed lean protein sources today. Recommended consuming 15-20gm protein at 3 meals daily    Instructions/Goals:     1. Include 15-20gm lean protein at each meal.  2. Increase vegetable/fruit intake, by having a vegetable or fruit with each meal daily. Recommended pt to increase vegetable/fruit intake to 4-5 servings daily.  3. Increase fluid intake to 64oz daily: choose plain or calorie/carbonation-free beverages.  4. Incorporate daily structured activity, 30-60 minutes most days of the week  5. Practice plate method: 1/2 plate lean/low fat protein source, vegetable/fruit, <25% of plate complex carbohydrates.  6. Read food labels more consistently: keeping total fat grams <10, total sugar grams <10, fiber >3gm per serving.  7. Separate fluids 30 minutes before/after meal times.  8. Practice eating off of smaller plates/bowls, chewing to applesauce consistency, taking 20-30 minutes to eat in a calm/relaxed environment without distractions of tv/email/cell phone.    Handouts Provided:  Pt. [facility] Bariatric Care Patient Handbook    Monitor/Evaluation:  Pt. s target weight: no gain from initial visit, pt. verbalized understanding.     Has realistic expectations for weight loss: Yes  Verbalizes understanding of dietary changes post procedure: Yes  Verbalizes understanding of supplement needs: No  Verbalizes willingness to participate in physical activity: Yes  Motivation for  change: average  RD s prediction for client success and compliance on a scale of 1 (low) to 10 (high):  5    Plan for next visit:   Bariatric plate. Give food journal homework.    Time In: 8:30a  Time Out: 9:00a    ABN signed: Yes

## 2021-06-16 NOTE — PROGRESS NOTES
Assessment/Plan:   Fever/Sore throat/Cough  Influenza-like illness  URI with cough and ST and fever x 3-4 days.  RST and flu test negative. Not much benefit with albuterol neb.  No history of bronchospasm. Some sinus pressure but only 3 days of illness, still likely viral.   - Influenza A/B Rapid Test  - acetaminophen tablet 650 mg (TYLENOL); Take 2 tablets (650 mg total) by mouth once.  - Rapid Strep A Screen-Throat  - Group A Strep, RNA Direct Detection, Throat  - XR Chest 2 Views  - albuterol nebulizer solution 3 mL (PROVENTIL); Take 3 mL by nebulization once.  - codeine-guaiFENesin (GUAIFENESIN AC)  mg/5 mL liquid; Take 5-10 mL by mouth every 6 (six) hours as needed for cough.  Dispense: 240 mL; Refill: 0  - benzonatate (TESSALON PERLES) 100 MG capsule; Take 1 capsule (100 mg total) by mouth 3 (three) times a day as needed for cough.  Dispense: 30 capsule; Refill: 0    Influenza like illness, should be getting toward the end - expect 5 days of contagiousness  Chest xray was normal  Cough syrup with codeine at night as needed for severe cough  Tessalon perles every 8 hours as needed for cough  Steam, nasal saline, humidifier  Cough drops  No work or work from home through Friday  Follow up if worse or no better     Subjective:      Lawson Daniels is a 38 y.o. male who presents with 3 day history of fever, cough, URI, ST body aches and fatigue.  No wheeze or SOB but cough is persistent especially at night.  Today he feels worse.  Some sinus pressure and HA.  Tried to go to work but was sent home.  Still feverish.  No chills.  No N/V/D.  No rash.  No history of asthma or RAD.  Generally healthy.  Has tried some cold medications, mucinex, fluids, tylenol or ibuprofen though has had none today. Prone to sinus infections and ear infections.  Has allergies in the fall.  Former smoker.     Allergies   Allergen Reactions     Demerol [Meperidine] Nausea And Vomiting     Sumatriptan Succinate      Annotation:  heartburn, dizziness, upset stomach, would not want to use it again       Tramadol Nausea And Vomiting     Annotation: when broke toe, given tramadol, had nausea and vomiting from that       Current Outpatient Prescriptions on File Prior to Visit   Medication Sig Dispense Refill     fexofenadine (ALLEGRA) 180 MG tablet Take 180 mg by mouth.       multivit-mins-ferrous gluconat 9 mg iron/15 mL Liqd Take by mouth.       No current facility-administered medications on file prior to visit.      Patient Active Problem List   Diagnosis     Morbid obesity with BMI of 40.0-44.9, adult     Acquired bilateral flat feet     Allergic rhinitis     PAC (premature atrial contraction)     Edema     Venous hypertension of lower extremity, bilateral     Valgus deformity of both feet     Snoring     Venous insufficiency of both lower extremities       Objective:     /84 (Patient Site: Right Arm, Patient Position: Sitting, Cuff Size: Adult Regular)  Pulse 76  Temp 99  F (37.2  C) (Oral)   Resp 22  Wt (!) 324 lb (147 kg)  SpO2 96%  BMI 45.19 kg/m2    Physical  General Appearance: Alert, cooperative, no distress, febrile, ill appearing.   Head: Normocephalic, without obvious abnormality, atraumatic  Eyes: Conjunctivae are normal.   Ears: Normal TMs and external ear canals, both ears  Nose: congestion, no sinus pain with percussion  Throat: Throat is red.  No exudate.  No significant lesions  Neck: No adenopathy  Lungs: decreased breath sounds, otherwise clear to auscultation bilaterally, respirations unlabored.  Frequent cough, harsh.  Albuterol neb given in the office without much subjective change.  auscultation the same.   Heart: Regular rate and rhythm  Extremities: No lower extremity edema  Skin: Skin color, texture, turgor normal, no rashes or lesions  Psychiatric: Patient has a normal mood and affect.      CXR obtained and viewed by me showed no definite infiltrate.     Recent Results (from the past 24 hour(s))    Influenza A/B Rapid Test   Result Value Ref Range    Influenza  A, Rapid Antigen No Influenza A antigen detected No Influenza A antigen detected    Influenza B, Rapid Antigen No Influenza B antigen detected No Influenza B antigen detected   Rapid Strep A Screen-Throat   Result Value Ref Range    Rapid Strep A Antigen No Group A Strep detected, presumptive negative No Group A Strep detected, presumptive negative

## 2021-06-16 NOTE — PROGRESS NOTES
Date of Service:  18    Date last seen by Dr. Ramos:  17    PCP: Aidan Comer MD    Impression:  1.  Bilateral leg swelling-improved  2.  Venous hypertension of the legs bilaterally-improved  3.  Valgus foot deformity-bilateral  4.  Morbid obesity-doing well losing weight     Plan:  1.  Questions were answered.  2.  Continue to work with bariatrics.   3.  Insoles for foot deformities.  Rewritten for.   4.  Compression stockings will continue to wear.  5.  Increasing exercise.  6.  Patient will follow up in 1 year, or when needed.     Time spent with patient 15 minutes with greater than 50% time in consultation, education and coordination of care.     ---------------------------------------------------------------------------------------------------------------------  Chief Complaint: Bilateral leg swelling     History of Present Illness:     Lawson Daniels returns to the [facility] for his bilateral leg swelling felt to be due to venous insufficiency with dependent swelling.  This was exacerbated by valgus foot deformity and morbid obesity.  He initially did not see bariatrics or get his insoles as recommended.  At his last visit we discussed this in detail.  He now has been to bariatrics and is working with them.  He also finally had a sleep study and it showed obstructive sleep apnea.  He is working with Dr. Alaniz.  He is getting his CPAP machine.   He is losing weight (down 25 pounds).  He is wearing compression.  He still needs to get the insoles.  He is considering bariatric surgery, but first wants to work on changes his lifestyle.  There has been no new numbness, tingling or weakness.  There have been no new masses, rashes, or swellings of any other joints. There has been no new decrease in appetite, unexplained weight loss, abdominal bloating and bowel or bladder changes      Past Medical History:   Diagnosis Date     Abnormal ECG     PACs     Fatty liver      Hx of  migraines     monthly     Jaw fracture 1993     Lower leg edema     compression socks. sees Dr. Ramos     Morbid obesity with BMI of 40.0-44.9, adult      PAC (premature atrial contraction) 2/24/2016     Pes Planus      Radius head fracture December 2008    left       Past Surgical History:   Procedure Laterality Date     CHOLECYSTECTOMY  May 2006    stones.     LAPAROSCOPIC CHOLECYSTECTOMY  May 2006    [place], [place]- [facility]     LASIK Bilateral 2015     VASECTOMY  4/30/2010    Dr Pickering at [facility]         Current Outpatient Prescriptions:      fexofenadine (ALLEGRA) 180 MG tablet, Take 180 mg by mouth., Disp: , Rfl:      multivit-mins-ferrous gluconat 9 mg iron/15 mL Liqd, Take by mouth., Disp: , Rfl:     Allergies   Allergen Reactions     Demerol [Meperidine] Nausea And Vomiting     Sumatriptan Succinate      Annotation: heartburn, dizziness, upset stomach, would not want to use it again       Tramadol Nausea And Vomiting     Annotation: when broke toe, given tramadol, had nausea and vomiting from that         Social History     Social History     Marital status:      Spouse name: Mitchel     Number of children: 2     Years of education: N/A     Occupational History      [employer]     Social History Main Topics     Smoking status: Former Smoker     Packs/day: 2.00     Years: 15.00     Types: Cigarettes     Start date: 8/23/1995     Quit date: 8/23/2006     Smokeless tobacco: Never Used     Alcohol use 0.0 - 1.2 oz/week     0 - 2 Cans of beer per week      Comment: now and then     Drug use: No     Sexual activity: Yes     Partners: Female     Birth control/ protection: Surgical      Comment: 2 kids     Other Topics Concern     Not on file     Social History Narrative    .  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       Family History   Problem Relation Age of Onset     Breast cancer Mother      Myasthenia gravis Father      Snoring Father       Breast cancer Maternal Grandmother      ALS Paternal Grandmother      No Medical Problems Son      No Medical Problems Daughter        Review of Systems:  Lawson Daniels no new numbess, tingling or weakness, redness or rashes, fevers, new masses, abdominal bloating or discomfort, unexplained weight loss, increased pain, new ulcers, shortness of breath and chest pain  Full 12 point review of systems was completed.    Imaging:    Xr Chest 2 Views    Result Date: 2/28/2018  XR CHEST 2 VIEWS 2/28/2018 3:28 PM INDICATION: fever, cough, wheeze COMPARISON: None. FINDINGS: Negative chest.      Labs:  Lab Results   Component Value Date    SEDRATE 3 09/12/2016         Lab Results   Component Value Date    CRP 0.4 09/12/2016           Lab Results   Component Value Date    CREATININE 0.84 01/12/2018      Lab Results   Component Value Date    HGBA1C 5.7 01/12/2018           Lab Results   Component Value Date    BUN 20 01/12/2018              Lab Results   Component Value Date    ALBUMIN 4.1 01/12/2018       Vitamin D, Total (25-Hydroxy)   Date Value Ref Range Status   01/12/2018 24.2 (L) 30.0 - 80.0 ng/mL Final       Lab Results   Component Value Date    TSH 1.77 01/12/2018     Lab Results   Component Value Date    WBC 9.4 01/12/2018    HGB 15.3 01/12/2018    HCT 45.1 01/12/2018    MCV 89 01/12/2018     01/12/2018         Physical Exam:  Vitals:    03/12/18 1127   BP: 128/88   Pulse: 72   Resp: 12   Temp: 99  F (37.2  C)      BMI 44.66 (decreased)    Circumferential measures:    Vasc Edema 8/23/2017 11/30/2017 3/12/2018   Right just above MTP 26.5 26.0 26   Right Ankle 26.3 25.2 25.5   Right Widest Calf 49.8 49.0 49.5   Right Thigh Up 10cm 61 60.0 57   Left - just above MTP 27 26.5 26.5   Left Ankle 24.8 24.0 25.5   Left Widest Calf 48 48.5 46   Left Thigh Up 10cm 60 59.2 53     Measures stable      General:  38 y.o. male in no apparent distress.  Alert and oriented x 3.  Cooperative. Affect normal.     Musculoskeletal:   Normal range of motion in knees and ankles bilaterally.  There is no active joint synovitis, erythema, swelling or joint laxity.  Valgus foot deformity bilaterally with flat feet.       Neurological:  Strength testing is normal in hip flexion, knee flexion, knee extension,  ankle dorsiflexion and great toe extension bilaterally.       Vascular: Dorsalis pedis and posterior tibialis pulses are strong and equal bilaterally. There are no significant telangietasias, medial ankle venous flares, venous varicosities and spider veins.     Integumentary: Skin of the legs is uniformly warm and dry and with negative Stemmer's Sign. Slight hemosiderin deposition bilateral distal anterior calves.   Nails are normal.  No unusual skin changes.      Laura Ramos MD, ABWMS, FACCWS, [facility]  Medical Director Wound Care and Lymphedema  [facility]  [phone]

## 2021-06-16 NOTE — PROGRESS NOTES
Health and Behavior Assessment with Intervention, Initial (60 minutes): Met with patient 1:1 to obtain demographics and background information, reasons for surgery, typical eating pattern/fluid intake as well as psychiatric history.  Lawson is a 38-year-old   male who would like to follow through with bariatric surgery for health reasons.  He has struggled with his weight for the past 13 years and now is concerned about various comorbidities.  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  He denied current anxiety symptoms.  He has good knowledge of the surgery and good support and has already lost 25 pounds.  In fact, he is now 100% certain that he wants to have surgery yet.  He will follow-up and complete psychological testing.  Diagnoses: F 54; E 66.01

## 2021-06-17 NOTE — PATIENT INSTRUCTIONS - HE
"Patient Instructions by Joseph Alaniz DO at 1/2/2019  8:00 AM     Author: Joseph Alaniz DO Service: -- Author Type: Physician    Filed: 1/2/2019  8:22 AM Encounter Date: 1/2/2019 Status: Signed    : Joseph Alaniz DO (Physician)         SLEEP HYGIENE    Sleep only as much as you need to feel rested and then get out of bed   Keep a regular sleep schedule   Avoid forcing sleep   Exercise regularly for at least 20 minutes, preferably 4 to 5 hours before bedtime   Avoid caffeinated beverages after lunch   Avoid alcohol near bedtime: no \"night cap\"   Avoid smoking, especially in the evening   Do not go to bed hungry   Adjust bedroom environment   Avoid prolonged use of light-emitting screens before bedtime    Deal with your worries before bedtime              "

## 2021-06-17 NOTE — PROGRESS NOTES
Follow Up Surgical Weight Loss Supervised Diet Evaluation  Assessment:  Pt presents for follow up supervised diet visit with RD.    This patient is a 38 y.o.  He is being seen today for follow-up nutritional evaluation. Lawson Daniels has been unsuccessful with non-surgical weight loss methods and is interested in bariatric surgery. Today we reviewed the patients current eating habits and level of physical activity, and instructed on the changes that are required for successful bariatric outcomes.    Workflow review: Pt has completed labs, attended support group and is still working with psych (today 4/23)  Weight goal: At or below initial weight     -has one month check-up with sleep dr - states when it stays on he feels great the next day  Pt Active Problem List Diagnosis:    Morbid obesity with BMI of 40.0-44.9, adult     Acquired bilateral flat feet     Allergic rhinitis     PAC (premature atrial contraction)     Edema     Venous hypertension of lower extremity, bilateral     Valgus deformity of both feet     Snoring     Venous insufficiency of both lower extremities     Pt's Initial Weight: 343.2 lbs  Weight: 321 lb (145.6 kg)  Weight loss from initial: 22.2  % Weight loss: 6.47 %  Body mass index is 44.52 kg/(m^2).    Calculated RMR ([place]-St Jeor equation): 2264 calories    Progress made since last visit: Pt is doing an excellent job - has hit a weight loss plateau (discussed). Pt is drinking enough water, continuing to chew well and slow down meal times. He is getting enough protein and has significantly reduced portions - snacking when hungry on protein items. Pt is also  fluids from meals 100% of the time.  Concerns: Pt drinking vanilla iced coffee daily - will eliminate; exercising less due to snow and cold he had      Diet Recall/Time:   Breakfast: vanilla iced coffee and protein drink (25g) and cereal OR waffle   Am Snack: Protein bar OR protein drink (30g)   Lunch: wrap from which which OR  Pro/CHO (21g)   Pm snack: protein drink (30g)   Dinner: Pro/Veg/CHO (21g)   HS Snack: pretzels and hummus     Protein: 100 plus     Typical Snacks or snack times: above   +physcially hungry when having a snack  Meals per week away from home: 3X/week - choices and portions have changed     Recommended limiting eating out to no more than 2x/week.  Patient and I reviewed the importance of eating three consistent meals per day; as well as meal timing to be spaced 4-5 hours apart.  Snack choices: 100-150 calories (1-2x/day if physically hungry), incorporating a fruit/vegetable w/ protein source.    Meal Duration:20 minutes  Encouraged slowing meal times down, 20-30 minutes, chewing to applesauce consistency.     Portion Sizes problematic? No Per patient/diet recall   To aid in proper portion control and slow meal time down discussed consuming meals off smaller plates, use toddler/children utensils and set utensils down after each bite.    Protein, vegetables/fruits, carbohydrates:   The patient and I discussed the importance of including lean/low fat protein at each meal and limiting carbohydrate intake to less than 25% of plate volume.     Vitamins   Post-op vitamin regimen: Multi Vit + iron 2x/day, calcium citrate 500-600 mg 2x/day, 7050-8714 mcg of Sublingual B-12 daily, and 5000 IU Vitamin D3 daily.    Beverages (Type/Oz. per day)  Water: 100plus oz  Coffee: vanilla iced coffee daily   Tea: none  Milk: 1 glass/day  Regular soda: none  Diet soda: none  Juice: none  Harjinder-Aid/lemonade/etc: none  Alcohol: none    Discussed the importance of adequate hydration after surgery and the goal of 64+ oz of fluid daily.   The patient understands the importance of avoiding all carbonated, caffeinated and sweetened drinks; and instead choose 64 oz plain water.    Fluid-meal separation:   Pt is working on  fluids 30min before and 30 minutes after meals.  Fluids are  30min before and 30 minutes after meals.  -100%    Exercise  2-3Xweek bike or shoveling     Pt's understands that 30-60 minutes of daily activity is an important part of bariatric surgery success.   Encouraged pt to incorporate upper body strength training exercise, even if its lifting soup cans while watching TV at night, doing pushups/sit-ups, and abdominal work.    PES statement:   1. (NC-3.3.5) Obese, class III, BMI ?40 related to physical inactivity as evidenced by Infrequent, low-duration and or low intensity physical activity; and Large amounts of sedentary activities; no structured physical activity regimen    Intervention:  Discussion:   1. Reviewed the Keys to Bariatric Success handout.  2. Reviewed lean protein sources and recommended to consume 15-20gm protein at 3 meals daily.  3. Gave food journal homework, to be completed for f/u appointment.  4. Bariatric Plate: The patient and I discussed the importance of including lean/low  fat protein at each meal and limiting carbohydrate intake to less  than 25% of plate volume.  Instructions/Goals:   1. Include 15-20gm protein at each meal.  2. Increase vegetable/fruit intake, by having a vegetable or fruit with each meal daily. Recommended pt to increase vegetable/fruit intake to 4-5 servings daily.  3. Increase fluid intake to 64oz daily: choose plain or calorie/carbonation-free beverages.  4. Incorporate daily structured activity, 30-60 minutes most days of the week  5. Fill out food journal and bring to next month's visit.  6. Practice plate method: 1/2 plate lean/low fat protein source, vegetable/fruit, <25% of plate complex carbohydrates.  7. Read food labels more consistently: keeping total fat grams <10, total sugar grams <10, fiber >3gm per serving.  8. Separate fluids 30 minutes before/after meal times.  9. Practice eating off of smaller plates/bowls, chewing to applesauce consistency, taking 20-30 minutes to eat in a calm/relaxed environment without distractions of tv/email/cell  phone.    Handouts provided:  Bariatric Plate  Food journal    Monitor/Evaluation:     Pt understands the importance of not gaining any weight from initial recorded weight.    Has realistic expectations for weight loss: Yes  Verbalizes understanding of dietary changes post procedure: Yes  Verbalizes understanding of supplement needs: No  Verbalizes willingness to participate in physical activity: Yes  Motivation for change: average  RD s prediction for client success and compliance on a scale of 1 (low) to 10 (high):  7/8    Plan for next visit:   Review Bariatric plate and food journal homework.  Educate on dumping syndrome and reading food labels.  (Final Supervised Diet visit with RD) pre/post-op  diet progression, give review of surgery process.  Review [place] to Bariatric Success  Check Understanding Quiz    Time In: 8:30a  Time Out: 9:00a    ABN signed: Yes

## 2021-06-17 NOTE — PROGRESS NOTES
"Dear Dr. Aidan Comer MD  [address] [facility]  [facility]  [address]  [place] [address],    Thank you for the opportunity to participate in the care of Lawson Daniels.     He is a 38 y.o.  male patient who comes to the sleep medicine clinic for review of his sleep study. The study was completed on 18 which showed the the patient had severe obstructive sleep apnea with an apnea hypopnea index of 112.5 events per hour with lowest O2 sat of 67%.  The patient was informed of the results by phone and offered the option of getting the paperwork started to get his own CPAP machine which he agreed to.  Since starting CPAP therapy he reports that his sleep quality and energy level have both improved.  He requests to increase his pressure settings if possible.    Compliance Download data for 30 days:  Pressure settin-16 CWP  Residual AHI: 0.5 events per hour  Leak: Minimal  Compliance: 7%  Mask Tolerance: Good  Skin irritation: None    Current Outpatient Prescriptions   Medication Sig Dispense Refill     fexofenadine (ALLEGRA) 180 MG tablet Take 180 mg by mouth.       multivit-mins-ferrous gluconat 9 mg iron/15 mL Liqd Take by mouth.       No current facility-administered medications for this visit.        Allergies   Allergen Reactions     Demerol [Meperidine] Nausea And Vomiting     Sumatriptan Succinate      Annotation: heartburn, dizziness, upset stomach, would not want to use it again       Tramadol Nausea And Vomiting     Annotation: when broke toe, given tramadol, had nausea and vomiting from that         Physical Exam:  /80  Pulse 70  Ht 5' 11.2\" (1.808 m)  Wt (!) 323 lb 12.8 oz (146.9 kg)  SpO2 98%  BMI 44.91 kg/m2  BMI:Body mass index is 44.91 kg/(m^2).   GEN: NAD, obese  Psych: normal mood, normal affect     Labs/Studies:  - We reviewed the results of the overnight PSG as described on the HPI.     Lab Results   Component Value Date    WBC 9.4 2018    HGB 15.3 2018    HCT 45.1 " 01/12/2018    MCV 89 01/12/2018     01/12/2018         Chemistry        Component Value Date/Time     01/12/2018 1523    K 4.2 01/12/2018 1523     (H) 01/12/2018 1523    CO2 23 01/12/2018 1523    BUN 20 01/12/2018 1523    CREATININE 0.84 01/12/2018 1523    GLU 98 01/12/2018 1523        Component Value Date/Time    CALCIUM 9.7 01/12/2018 1523    ALKPHOS 80 01/12/2018 1523    AST 33 01/12/2018 1523    ALT 66 (H) 01/12/2018 1523    BILITOT 0.6 01/12/2018 1523            Lab Results   Component Value Date    FERRITIN 236 01/12/2018           Assessment and Plan:  In summary Lawson Daniels is a 38 y.o. year old male here for review of his sleep study and compliance download data.  1. Obstructive Sleep Apnea  I reviewed the results of sleep study with the patient in detail.  I will also narrow his CPAP pressure range to 11- 14 CWP.  I had the patient test out this pressure setting in front of me and he felt it was comfortable.  2.  Other sleep disturbance  3.  Obesity     Patient verbalized understanding of these issues, agrees with the plan and all questions were answered today. Patient was given an opportuntity to voice any other symptoms or concerns not listed above. Patient did not have any other symptoms or concerns.        Joseph Alaniz DO  Board Certified in Internal Medicine and Sleep Medicine  [facility].    We spent a total of 15 minutes of face-to-face encounter and more than 50% of the encounter was used for counseling or coordination of care.    (Note created with Dragon voice recognition and unintended spelling errors and word substitutions may occur)

## 2021-06-17 NOTE — PROGRESS NOTES
Workflow updated with RD and Psych clearance.  Patient is ready for his AB Consult.  I don't see where he has picked a surgeon yet though.  Ayaka Clifton RN

## 2021-06-17 NOTE — PROGRESS NOTES
Health and Behavior Assessment with Intervention for  Bariatric Surgery Candidates    Name: Lawson Daniels   YOB: 1980  Dates of Service: 3/12/2018, 4/23/2018  Psychological Testing: 3/5/2018  Report Date: 4/23/2018    Height: 5 feet 11 inches Reported Weight: 318 pounds  BMI: 44.10  Anticipated Weight: 260 pounds    Identifying Data: This is a 38 y.o. ,  father of 2 children (ages 11 and 8). He was referred by [facility] to determine readiness for bariatric surgery from a psychosocial perspective. He learned about the surgery by attending the informational meeting, going to support group, going on the Internet and seeing her wife's friends have surgery.    Reason for Pursuing Surgery: He would like to follow through with bariatric surgery for health reasons.  He wants to be more active with his children.    Diagnostic Impressions:  Principal Diagnosis: F 56 psychological factors affecting medical condition  Secondary diagnoses: Morbid obesity, high blood pressure, edema, shortness of breath, sleep apnea on CPAP, gallbladder removed, migraine headaches and history of fatty liver disease    Conclusions    Recommendations: Based on the information gathered from this evaluation, this patient is now ready to follow through with bariatric surgery as soon as possible. The final decision to follow through with bariatric surgery should be done in collaboration with [facility] clinical staff.    Current Treatment Plan and Aftercare Plan: This patient agrees to continue to prepare for surgery and to participate in aftercare as directed by [facility] clinical staff. This includes following up with this clinician 3-6 months post-operatively, attending the [facility] support group meeting and continuing to make the lifestyle and eating changes such as documenting his eating, measuring his food, planning out his  "meals and increasing activity level.    Special Needs or Precautions: Monitor this patient's ability to avoid mindless eating.      Compliance, Motivation and Expectations (Change in Behavior): This patient has made significant changes in his eating and lifestyle. He is highly motivated to continue to make these changes post-operatively. He has realistic expectations about the surgery.     Self-Perception of Readiness for Surgery: This patient rated her readiness for surgery at a 8, where 10 means \"extremely well prepared.\"    The following history supports the above conclusions and treatment recommendations.    History of Presenting Illness: This patient has struggled with his weight since age 25 or 26.  He reached 300 pounds at age 29 or 30.  His highest weight was 343 pounds 3 months ago. He believes morbid obesity has affected his daily life through feeling self-conscious and embarrassed, having difficulty buying clothing, sitting in a herrera at a restaurant, sitting in an airplane or theater seat as well as low endurance and shortness of breath.    Previous Attempts at Disease Management: This patient tried Nutrisystem and at most lost more than 50 pounds.  He believes he gained weight over time because of decreased activity, portion control and boredom eating.    Self-Care Behaviors  Day and Night Routine: This patient goes to sleep at midnight and wakes up at 5 AM.  His work hours are between 7:30 AM and 6:30 PM as the  for [employer].  Otherwise he spends time with his family.      Boundaries and Limit Setting: He denied any difficulty setting limits with others.    Self-Care and Treatment Adherence: He believes that he can do a better job of taking care of himself.    Stress Management and Coping Mechanisms: This patient lisa with stress by exercising.  He does have a history of stress and boredom eating.    Other Impulsive and Compulsive Tendencies  Gambling: " Denied  Compulsive Spending: Denied   Credit Card Debt:  denied     Bankruptcy: This patient went through bankruptcy 3-4 years ago.    Legal History: Denied    Physical and Leisure Activities:  this patient is on the stationary bike for 30-60 minutes more than 4 times per week.    Substance Use  OTC and Prescription Medications: This patient denied a history of medication abuse and reportedly takes his medications as directed.  Nicotine: This patient started smoking cigarettes at age 15, was up to almost 2 packs per day and quit in April 2006.    Alcohol: This patient started drinking alcohol at age 18 or 19 has had none since October 2017 and was told that he had fatty liver disease.  He understands the increased risk of intoxication following a bariatric surgical procedure.  Illicit Substances: This patient started using cannabis at age 18, was up to a weekly basis and last did so at age 21.      Typical Eating Pattern and Fluid Intake  Eating Disorder-Related Behavior: This patient denied a history of misusing diuretics or laxatives, of making himself vomit to lose weight, of starving himself, of over-exercising, of taking illegal substances or of smoking cigarettes for weight control purposes.  He has a history of overeating, grazing as well as stress and boredom eating.  Historically he tended to eat his first meal between 6 and 7 AM consisting of a Starbucks Frappuccino and yogurt.  Later he would snack on yogurt.  Lunch was between 11:30 AM and 12 PM consisting of hot dish, burrito and sandwich/wrap.  Later he would snack on fiber bar or kind bar.  Dinner was at 6 PM consisting of hot dish, fish and vegetables.  Later he would snack on yogurt, hummus and pretzels.  He was having 8 ounces of Frappuccino, between 3 and 4 L of water and 16 ounces of 1% milk on a daily basis.    Since starting the health and behavior assessment he was eating breakfast at 6 AM consisting of a Starbucks Frappuccino, waffle, protein  "shake and cereal.  Later he would have another protein shake.  Lunch was between 12 and 1 PM consisting of hamburger from the vending machine, leftovers, noodles/pasta and later a protein bar.  Dinner was between 630 and 7 PM consisting of vegetables, chicken, brought worse, fish tacos, ground beef and pretzels/hummus.  He was still having 8 ounces of  Starbucks Frappuccino, 8 ounces of milk in 3-4 L of water on a daily basis.  He tended to lose control of his eating when he was bored or on his own and his comfort food included Greek yogurt or cheese puffs.    Psychiatric History  Traumatic Life Events and Abuse/Neglect History: This patient denied a history of trauma as well as physical, emotional or sexual abuse.  He sees himself as \"overweight.\"  He denied being put down or teased because of his physical condition.  He denied depressive symptoms.  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  He did have plans to go to [place].  He denied recent anxiety but did have panic related to the deployment.  He denied obsessive-compulsive symptoms.  He denied being in psychotherapy or on a psychotropic medication trial.    Medical History (by patient report and without consulting with his primary care physician). This patient is followed medically by Aidan Comer MD at [facility] although has not discussed surgery with him.      Allergies/Sensitivities: Demerol  Prenatal Complications, Birth Trauma, Unusual Birth Weight: Denied  Head Trauma, Concussion, Extremely High Fevers, Seizures: Concussion playing football   Thyroid Function: Reportedly normal.  Hospitalizations and Surgeries: Gallbladder and Lasix  Current Medications:   Current Outpatient Prescriptions:      fexofenadine (ALLEGRA) 180 MG tablet, Take 180 mg by mouth., Disp: , Rfl:      multivit-mins-ferrous gluconat 9 mg iron/15 mL Liqd, Take by mouth., Disp: , Rfl:   Vitamins/Supplements:  B12  Activities of Daily Living " "(ADLs): This patient denied any difficulty meeting his hygiene needs.  Attitudes, Fears, or Concerns Regarding this Surgery: This patient noted \"is it going to be beneficial?\"  He seems to understand the risks.    Family History  This patient has a family history that is positive for obesity, arthritis and cancer.    Social and Developmental History:  This patient was born and raised in [place], [place], [place], [place], [place], [place].  His mother (64) and father (66) are still  and lives in [place], [place] near this patient's sister (39).  He also has a 34-year-old brother.  He is very close to her brother and talks to his other family members regularly.  During his upbringing he found to be very active.  He was outside and on the go.  He was involved in sports such as wrestling.    Educational History: This patient graduated from [employer] school in 2000.  He received his associates degree from [employer] and his bachelor's degree in IT from [employer].  Employment: This patient is a  for [employer], has been working with the company since 2008 and likes the job.  Current Living Situation, Partner, and Children: This patient has been  for 14 years and is in a happy and supportive relationship.  He has an 11-year-old son and an 8-year-old daughter who are healthy.  His support includes his wife, parents, siblings and friends.  Judaism Orientation/Spiritual Support: Jain    History: [employer]  Two Year Goals: This patient would like to be healthier and be at a lower weight.    Psychological Testing: The Alcohol Use Disorders Identification Test (AUDIT) is a measure to determine how alcohol affects overall functioning and treatment. His score was 0 which is at a subclinical level suggesting that alcohol likely will not affect his functioning in the least.    The [place] " Multiphasic Personality Aoncwpnem-6-Vtdkhquispri Form (MMPI-2-RF) was responded to in an open and honest manner and the profile is valid and interpretable.  Individuals with profiles similar to his tend to have a history of impulsivity.  They have somatic based complaints and worry about their health.  They may speak their mind and likely have been in trouble as adolescents.  They can be seen as aggressive towards others.    The [facility] Behavioral Medicine Diagnostic (MBMD) was responded to in an open and honest manner and the profile is valid and interpretable.  Individuals with profiles similar to his tend to be cooperative with clinical staff.  They may be self-critical.  They see themselves as spiritual.  They likely will not abuse medications.    The Quality of Life Inventory (QOLI) is a measure to determine overall satisfaction with life. His overall Quality of Life T-Score was in the high range.  He was not dissatisfied with any aspect of his life and most satisfied with family and children.    The Eating Inventory is a measure to determine ability to follow through a treatment program as well as restrict and control eating even during times of stress and emotion. His Cognitive Restraint of Eating, Disinhibition and Hunger scores are all within normal limits suggesting that he has adequate knowledge of nutrition information and would not lose control of his eating when under stress.    Mental Status: This patient came to the evaluation setting dressed casually, although appropriately. He was generally cooperative and responded appropriately to this clinician's questions. His affect was generally bright and His mood was consist with his affect. There is no evidence of obsessions, compulsions, suicidal ideation or homicidal ideation. There is no evidence of hallucinations, delusions, paranoid ideation, grossly inappropriate affect or other chilo manifestation of psychotic disorder. He was oriented to person,  place and time, and there is no evidence of any problems with impulse control.

## 2021-06-18 NOTE — PROGRESS NOTES
Follow Up Surgical Weight Loss Supervised Diet Evaluation  Assessment:  Pt presents for follow up supervised diet visit with RD.    This patient is a 38 y.o.  He is being seen today for follow-up nutritional evaluation. Lawson Daniels has been unsuccessful with non-surgical weight loss methods and is interested in bariatric surgery. Today we reviewed the patients current eating habits and level of physical activity, and instructed on the changes that are required for successful bariatric outcomes.    Workflow review: Pt has completed labs, attended support group and has been cleared by the psychologist   Weight goal: At or below initial weight     Pt Active Problem List Diagnosis:  Patient Active Problem List   Diagnosis     Morbid obesity with BMI of 40.0-44.9, adult     Acquired bilateral flat feet     Allergic rhinitis     PAC (premature atrial contraction)     Edema     Venous hypertension of lower extremity, bilateral     Valgus deformity of both feet     Snoring     Venous insufficiency of both lower extremities       Pt's Initial Weight: 343.2 lbs  Weight: 314 lb (142.4 kg)  Weight loss from initial: 29.2  % Weight loss: 8.51 %  Body mass index is 43.55 kg/(m^2).    Calculated RMR (Kamas-St Jeor equation): 2264 calories    Progress made since last visit: Pt is no longer drinking caffeine, eating enough protein, exercising regularly and drinking enough water   Concerns: Pt will continue to slow down meals and separate fluids from meals  ++Pt did have questions about if he could continue to lose weight on his own w/o surgery (discussed - rec talking more with wife and when he meets with surgeon)     Diet Recall/Time:   Breakfast: protein drink (25g)   Am Snack: Protein bar OR protein drink (30g)   Lunch: wrap from which which OR Pro/CHO (21g)   Pm snack: protein drink (30g)   Dinner: Pro/Veg/CHO (21g)   HS Snack: pretzels and hummus   Protein: 100g    Typical Snacks or snack times: protein drink  Meals per  week away from home: 1-2    Recommended limiting eating out to no more than 2x/week.  Patient and I reviewed the importance of eating three consistent meals per day; as well as meal timing to be spaced 4-5 hours apart.  Snack choices: 100-150 calories (1-2x/day if physically hungry), incorporating a fruit/vegetable w/ protein source.    Meal Duration:20-30minutes   Encouraged slowing meal times down, 20-30 minutes, chewing to applesauce consistency.     Portion Sizes problematic? No Per patient/diet recall   To aid in proper portion control and slow meal time down discussed consuming meals off smaller plates, use toddler/children utensils and set utensils down after each bite.    Protein, vegetables/fruits, carbohydrates:   The patient and I discussed the importance of including lean/low fat protein at each meal and limiting carbohydrate intake to less than 25% of plate volume.     Vitamins   Post-op vitamin regimen: Multi Vit + iron 2x/day, calcium citrate 500-600 mg 2x/day, 8209-6429 mcg of Sublingual B-12 daily, and 5000 IU Vitamin D3 daily.    Beverages (Type/Oz. per day)  Water: 100plus oz  Coffee: none  Tea: none  Milk: 1-2cups/day   Regular soda: none  Diet soda: none  Juice: none  Harjinder-Aid/lemonade/etc: none  Alcohol: rare    Discussed the importance of adequate hydration after surgery and the goal of 64+ oz of fluid daily.   The patient understands the importance of avoiding all carbonated, caffeinated and sweetened drinks; and instead choose 64 oz plain water.    Fluid-meal separation:   Pt is working on  fluids 30min before and 30 minutes after meals.  Fluids are  30min before and 30 minutes after meals. 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   Encouraged pt to incorporate upper body strength training exercise, even if its lifting soup cans while  watching TV at night, doing pushups/sit-ups, and abdominal work.    PES statement:   1. (NC-3.3.5) Obese, class III, BMI ?40 related to physical inactivity as evidenced by Infrequent, low-duration and or low intensity physical activity; and Large amounts of sedentary activities; no structured physical activity regimen    Intervention:  Discussion:   1. Reviewed the Keys to Bariatric Success handout.  2. Reviewed lean protein sources and recommended to consume 15-20gm protein at 3 meals daily.  3. Gave pt Check Your Understanding Quiz, and assessed readiness for change.  4. Educated on pre/post-op diet progression, post-op vitamin regimen, gave review of surgery process.  5. Dumping syndrome: choosing foods with less than 5-10gm fat/sugar per serving to avoid dumping syndrome for [facility] pts.  6. Reviewed Bariatric plate and food journal homework.    Instructions/Goals:   1. Include 15-20gm protein at each meal.  2. Increase vegetable/fruit intake, by having a vegetable or fruit with each meal daily. Recommended pt to increase vegetable/fruit intake to 4-5 servings daily.  3. Increase fluid intake to 64oz daily: choose plain or calorie/carbonation-free beverages.  4. Incorporate daily structured activity, 30-60 minutes most days of the week  5. Practice plate method: 1/2 plate lean/low fat protein source, vegetable/fruit, <25% of plate complex carbohydrates.  6. Read food labels more consistently: keeping total fat grams <10, total sugar grams <10, fiber >3gm per serving.  7. Separate fluids 30 minutes before/after meal times.  8. Practice eating off of smaller plates/bowls, chewing to applesauce consistency, taking 20-30 minutes to eat in a calm/relaxed environment without distractions of tv/email/cell phone.    Handouts provided:  Pt. [facility] Patient Handbook    Monitor/Evaluation:     Pt understands the importance of not gaining any weight from initial recorded weight.    Has realistic  expectations for weight loss: Yes  Verbalizes understanding of dietary changes post procedure: Yes  Verbalizes understanding of supplement needs: Yes  Verbalizes willingness to participate in physical activity: Yes  Motivation for change: average  Client s predicted compliance on a scale of 1 (low) to 10 (high): 8/9  RD s prediction for client success and compliance on a scale of 1 (low) to 10 (high):  8/9    Pt has made the necessary changes, and is knowledgeable and well-informed of the dietary and physical activity requirements that are necessary for successful bariatric outcomes. This Pt is an appropriate candidate for surgery from a nutrition standpoint at this time. The patient understands that surgery is a tool, and not a cure, and our aftercare program must be followed.    Time In: 10:30a  Time Out: 11:00a    ABN signed: Yes

## 2021-06-18 NOTE — PROGRESS NOTES
I was consulted by Aidan Comer MD to evaluate this pt for Bariatric Surgery.    HPI: Lawson Daniels is a 38 y.o. male here today for consideration of metabolic and bariatric surgery. he has had weight problems for the last 15 years. he Has tried multiple weight loss programs in the past with good success.  He has lost up to 50 lbs.  he carries most of his/her obesity in through their abdomen, and hips.   This pt does not have Hypertention.   This pt does not have GERD.   The pt has sleep apnea which is being treated with a CPAP machine.   This pt does not have diabetes.  .    Allergies:Demerol [meperidine]; Sumatriptan succinate; and Tramadol    Past Medical History:   Diagnosis Date     Abnormal ECG     PACs     Fatty liver      Hx of migraines     monthly     Jaw fracture 1993     Lower leg edema     compression socks. sees Dr. Ramos     Morbid obesity with BMI of 40.0-44.9, adult      PAC (premature atrial contraction) 2/24/2016     Pes Planus      Radius head fracture December 2008    left       Past Surgical History:   Procedure Laterality Date     CHOLECYSTECTOMY  May 2006    stones.     LAPAROSCOPIC [facility]CYSTECTOMY  May 2006    [facility]     LASIK Bilateral 2015     VASECTOMY  4/30/2010    Dr Pickering at [facility]       CURRENT MEDS:      Family History   Problem Relation Age of Onset     Breast cancer Mother      Myasthenia gravis Father      Snoring Father      Breast cancer Maternal Grandmother      ALS Paternal Grandmother      No Medical Problems Son      No Medical Problems Daughter         reports that he quit smoking about 11 years ago. His smoking use included Cigarettes. He started smoking about 22 years ago. He has a 30.00 pack-year smoking history. He has never used smokeless tobacco. He reports that he drinks alcohol. He reports that he does not use illicit drugs.    Review of Systems - 12 point Review of Systems is negative except for the issues mentioned  "above.    PSYCHIATRIC: he has undergone a lifestyle assessment and has been deemed a good candidate for bariatric surgery by the psychologist.    /82  Pulse 61  Resp 18  Ht 5' 11\" (1.803 m)  Wt (!) 319 lb (144.7 kg)  SpO2 100%  BMI 44.49 kg/m2  Body mass index is 44.49 kg/(m^2).    EXAM:  GENERAL: This is a well-developed 38 y.o. male who appears his stated age  HEAD & NECK: Grossly normal.  No palpable thyroid lesions  CARDIAC: RRR without murmur  CHEST/LUNG:  Clear to auscultation  ABDOMEN: Obese.  Nontender.  No hernias or masses appreciated. Scars from a Lap Brittany.  LYMPHATIC:  No significant adenopathy appreciated.    EXTREMITIES: Grossly normal.  No evidence of chronic venous stasis.    NEUROLOGIC: Focally intact  INTEGUMENT: No open lesions or ulcers  PSYCHIATRIC: Normal affect. he has a good grasp on the nature of his obesity and the treatment options.    LABS:  Lab Results   Component Value Date    WBC 9.4 01/12/2018    WBC 10.0 08/05/2014    HGB 15.3 01/12/2018    HCT 45.1 01/12/2018    MCV 89 01/12/2018     01/12/2018     INR/Prothrombin Time      Lab Results   Component Value Date    HGBA1C 5.7 01/12/2018     Lab Results   Component Value Date    ALT 66 (H) 01/12/2018    AST 33 01/12/2018    ALKPHOS 80 01/12/2018    BILITOT 0.6 01/12/2018       Assessment/Plan: 38 y.o. male who is an excellent candidate for bariatric and metabolic surgery.  After a careful conversation with the patient it was decided that a  Laparoscopic Sleeve Gastrectomy. would be his best option.       I went over the surgery in detail with him.  I went over the nature of the operation and some of the potential consequences of the surgery.  I went over the expected hospital course and discussed laparoscopic versus open surgery, understanding that we will plan on doing this laparoscopically with the possibility of having to convert to an open operation.  I went over some of the risks and complications of the " operation including, but not limited to, DVT, pulmonary emboli, pneumonia, postoperative bleeding, wound infection, staple line leak, intra-abdominal sepsis, and possible death.  I also went over some of the potential nutritional concerns such as vitamin B-12, iron, vitamin D, vitamin A, calcium and protein deficiencies.  I will also went over the need for lifelong nutritional surveillance.  The patient understands and wants to proceed with surgery.  We will submit for prior authorization.      Marcos Hernández MD  [facility] Department of Surgery  [phone]

## 2021-06-18 NOTE — PROGRESS NOTES
"Dear Dr. Aidan Comer MD  [address] [facility]  [facility]  [address]  [place] [address],    Thank you for the opportunity to participate in the care of Lawson Daniels.   Since the patient's last clinical visit with me his sleep quality and energy level have both improved with his increased CPAP usage.  He feels the current pressure setting is the right optimal pressure.  What is limiting his usage is done nasal congestion that would occur making it difficult for him to use his CPAP.    Compliance Download data for 30 days:  Pressure settin-14 CWP  Residual AHI: 1.8 events per hour  Leak: Minimal  Compliance: 37%  Mask Tolerance: Good  Skin irritation: None      Current Outpatient Prescriptions   Medication Sig Dispense Refill     fexofenadine (ALLEGRA) 180 MG tablet Take 180 mg by mouth.       multivit-mins-ferrous gluconat 9 mg iron/15 mL Liqd Take by mouth.       No current facility-administered medications for this visit.        Allergies   Allergen Reactions     Demerol [Meperidine] Nausea And Vomiting     Sumatriptan Succinate      Annotation: heartburn, dizziness, upset stomach, would not want to use it again       Tramadol Nausea And Vomiting     Annotation: when broke toe, given tramadol, had nausea and vomiting from that         Physical Exam:  /84  Pulse 63  Ht 5' 11\" (1.803 m)  Wt (!) 314 lb 11.2 oz (142.7 kg)  SpO2 95%  BMI 43.89 kg/m2  BMI:Body mass index is 43.89 kg/(m^2).   GEN: NAD, obese  Psych: normal mood, normal affect     Labs/Studies:      Lab Results   Component Value Date    WBC 9.4 2018    HGB 15.3 2018    HCT 45.1 2018    MCV 89 2018     2018         Chemistry        Component Value Date/Time     2018 1523    K 4.2 2018 1523     (H) 2018 1523    CO2 23 2018 1523    BUN 20 2018 1523    CREATININE 0.84 2018 1523    GLU 98 2018 1523        Component Value Date/Time    CALCIUM 9.7 " 01/12/2018 1523    ALKPHOS 80 01/12/2018 1523    AST 33 01/12/2018 1523    ALT 66 (H) 01/12/2018 1523    BILITOT 0.6 01/12/2018 1523            Lab Results   Component Value Date    FERRITIN 236 01/12/2018           Assessment and Plan:  In summary Lawson Daniels is a 38 y.o. year old male here for compliance download.  1. Obstructive Sleep Apnea  I will keep the patient on same pressure settings as per his request.  I recommended using Nasacort or Nasonex to help decrease his allergic rhinitis so as to facilitate his increased CPAP usage.  2.  Other sleep disturbance     Patient verbalized understanding of these issues, agrees with the plan and all questions were answered today. Patient was given an opportuntity to voice any other symptoms or concerns not listed above. Patient did not have any other symptoms or concerns.        Joseph Alaniz DO  Board Certified in Internal Medicine and Sleep Medicine  [facility].    We spent a total of 15 minutes of face-to-face encounter and more than 50% of the encounter was used for counseling or coordination of care.    (Note created with Dragon voice recognition and unintended spelling errors and word substitutions may occur)

## 2021-06-18 NOTE — PROGRESS NOTES
I have submitted a prior authorization request on behalf of this patient to [employer] to be approved for a LSG with Dr. Marcos Hernández.  Copy to Sota to scan to patients chart

## 2021-06-19 NOTE — PROGRESS NOTES
"Bariatric Clinic Follow-Up Visit:    Lawson Daniels is a 38 y.o.  male with Body mass index is 41.92 kg/(m^2).  presenting here today for follow-up on non-surgical efforts for weight loss. Original Intake visit occurred on 1/12/18 in our surgical program with a weight of 343 lbs and BMI of 47.6.  Along with diet and behavior changes, he has been using no medications to assist his weight loss goals, just working on his diet with our dietician while going through the surgical program. He decided at the last minute to cancel surgery and continue with non surgical means and is here to follow up on guidance/possible medication assistance.  See his intake visit notes for details on identified contributors to weight gain in the past.    Weight:   Wt Readings from Last 2 Encounters:   08/10/18 (!) 302 lb 11.2 oz (137.3 kg)   07/09/18 (!) 306 lb (138.8 kg)    pounds  Height: 5' 11.25\" (1.81 m) (8/10/2018  2:37 PM)  Initial Weight: 343.2 lbs (7/9/2018  2:58 PM)  Weight: 302 lb 11.2 oz (137.3 kg) (8/10/2018  2:37 PM)  Weight loss from initial: 37.2 (7/9/2018  2:58 PM)  % Weight loss: 10.84 % (7/9/2018  2:58 PM)  BMI (Calculated): 41.9 (8/10/2018  2:37 PM)  SpO2: 95 % (8/10/2018  2:37 PM)  Heart Rate (/min): 75 (1/12/2018  1:09 PM)  BP (mmHg): 155/94 (1/12/2018  1:09 PM)  Waist Circumference (In): 58.25 Inches (1/12/2018  1:09 PM)  Hip Circumference (In): 50.75 Inches (1/12/2018  1:09 PM)    Comorbidities:  Patient Active Problem List   Diagnosis     Morbid obesity with BMI of 40.0-44.9, adult (H)     Acquired bilateral flat feet     Allergic rhinitis     PAC (premature atrial contraction)     Edema     Venous hypertension of lower extremity, bilateral     Valgus deformity of both feet     Snoring     Venous insufficiency of both lower extremities       Current Outpatient Prescriptions:      fexofenadine (ALLEGRA) 180 MG tablet, Take 180 mg by mouth., Disp: , Rfl:      multivit-mins-ferrous gluconat 9 mg iron/15 mL Liqd, Take " 15 mL by mouth 2 (two) times a day. , Disp: , Rfl:      naltrexone (DEPADE) 50 mg tablet, Start half tab daily for 10 days then increase to half tab twice daily (breakfast and supper)., Disp: 60 tablet, Rfl: 0      Interim: Since our last visit, he has noted some plateau effect around 300 lbs. He's willling but hesitant to use medication but open to a trial of naltrexone. His anxious affect would be unlikely to do well with stimulating medications.  He's running/coaching Lacrosse and getting in 4-5miles daily of dog walking most days. No strength training at this time. Former runner and wants to get back to 10k and half marathons. Contemplating a couch to 5k plan which I encouraged. He's using CPAP for URIEL and sleeping much better now. Working on weight loss to improve fatty liver disease as well. Not taking PPI, B12 or D currently as not proceeding with surgery. Encouraged to continue D use given lowish levels on testing in January.     Plan:   1. You've decided to forego the surgical program and start a more non surgical program. Continue working with Scott and being mindful about your protein goals, 3 meals a day and getting your hydration in.  2. Trial of Naltrexone, half a tab daily with supper for 2 weeks then increase to twice daily dosing at half a tab each dose.  3. You should be using some vitamin D3, 2000-5000IUs daily should produce improved levels from last January.  4. Add resistance/strengthening program at least 2x weekly and start a couch to 5k program gearing up for a Turkey trot/Monster Dash.  5. Recheck with me in about 3-6 weeks to see how things are going and to correct any problems. Christianat is a good way to update me.    We discussed [facility] Bariatric Basics including:  -eating 3 meals daily  -eating protein first  -eating slowly, chewing food well  -avoiding/limiting calorie containing beverages  -We discussed the importance of restorative sleep and stress management in maintaining a healthy  weight.  -We discussed the National Weight Control Registry healthy weight maintenance strategies and ways to optimize metabolism.  -We discussed the importance of physical activity including cardiovascular and strength training in maintaining a healthier weight and explored viable options.    Most recent labs:  Lab Results   Component Value Date    WBC 11.8 (H) 07/09/2018    HGB 16.2 07/09/2018    HCT 48.5 07/09/2018    MCV 91 07/09/2018     07/09/2018     Lab Results   Component Value Date    CHOL 187 01/12/2018     Lab Results   Component Value Date    HDL 33 (L) 01/12/2018     Lab Results   Component Value Date    LDLCALC 101 01/12/2018     Lab Results   Component Value Date    TRIG 263 (H) 01/12/2018     No components found for: CHOLHDL  Lab Results   Component Value Date    ALT 40 07/09/2018    AST 21 07/09/2018    ALKPHOS 79 07/09/2018    BILITOT 0.9 07/09/2018     Lab Results   Component Value Date    HGBA1C 5.7 01/12/2018     Lab Results   Component Value Date    XLWDFKMM75 505 01/12/2018     Lab Results   Component Value Date    KWAFQKZJ55SF 24.2 (L) 01/12/2018     Lab Results   Component Value Date    FERRITIN 236 01/12/2018     Lab Results   Component Value Date    PTH 37 01/12/2018     No results found for: 30546  No results found for: 7597  Lab Results   Component Value Date    TSH 1.77 01/12/2018     No results found for: TESTOSTERONE    DIETARY HISTORY    Positive Changes Since Last Visit: continues good weight loss  Struggling With: lack of resistance training    Knowledgeable in Reading Food Labels: yes  Getting Adequate Protein: often  Sleeping 7-8 hours/day often, doing well now w/ CPAP.  Stress management stable    PHYSICAL ACTIVITY PATTERNS:  Cardiovascular: walks/jogs 4-6 miles most days between dog walking and Lacrosse practice  Strength Training: minimal    REVIEW OF SYSTEMS  GENERAL/CONSTITUTIONAL:  No illness  HEENT:   na  CARDIOVASCULAR:   no pain  PULMONARY:   no  "DA SILVA  GASTROINTESTINAL:  No pain  UROLOGIC:  na  NEUROLOGIC:  No headaches  PSYCHIATRIC:  Tends towards anxiousness  MUSCULOSKELETAL/RHEUMATOLOGIC   no injuries  ENDOCRINE:  na  DERMATOLOGIC:  No rash    PHYSICAL EXAM:  Vitals: /82 (Patient Site: Right Arm, Patient Position: Sitting, Cuff Size: Adult Large)  Pulse 66  Ht 5' 11.25\" (1.81 m)  Wt (!) 302 lb 11.2 oz (137.3 kg)  SpO2 95%  BMI 41.92 kg/m2  Height: 5' 11.25\" (1.81 m) (8/10/2018  2:37 PM)  Initial Weight: 343.2 lbs (7/9/2018  2:58 PM)  Weight: 302 lb 11.2 oz (137.3 kg) (8/10/2018  2:37 PM)  Weight loss from initial: 37.2 (7/9/2018  2:58 PM)  % Weight loss: 10.84 % (7/9/2018  2:58 PM)  BMI (Calculated): 41.9 (8/10/2018  2:37 PM)  SpO2: 95 % (8/10/2018  2:37 PM)  Heart Rate (/min): 75 (1/12/2018  1:09 PM)  BP (mmHg): 155/94 (1/12/2018  1:09 PM)  Waist Circumference (In): 58.25 Inches (1/12/2018  1:09 PM)  Hip Circumference (In): 50.75 Inches (1/12/2018  1:09 PM)    GEN: Pleasant, well groomed, in no acute distress  HEENT: PEERL, EOMI, airway clear .  NECK: No swelling.  HEART: Rhythm regular, rate regular, no murmur   LUNGS: Clear without crackles or wheezes. No cough.  ABDOMEN: obese..  EXTREMITIES: 2 plus palpable peripheral pulses, radial. No tremor.  NEURO: Alert and Oriented X3, normal gait and speech.  SKIN: No visible rashes..        50 minutes was spent in direct consultation, with over 50% of it spent in counseling regarding their plan for excess weight reduction and health modification.  Pete Castrejon MD  [facility]  3:24 PM    "

## 2021-06-19 NOTE — PROGRESS NOTES
Preoperative Exam    This very pleasant 38-year-old male who works as an  for [employer], presents for preoperative evaluation prior to gastric sleeve surgery by Dr. Hernández on July 30, 2018.  Patient has had past surgery including cholecystectomy at which time he had substantial vomiting and nausea with Klonopin.  Otherwise no adverse anesthesia reactions.  Family history is negative for malignant hyperthermia, he has URIEL and will bring his CPAP, is not on any blood thinners, is a Mallampati class II.      Scheduled Procedure: LAPAROSCOPIC SLEEVE GASTRECTOMY  Surgery Date:  7/30/18  Surgery Location: [facility], fax [phone]    Surgeon:  Marcos Hernández MD    Assessment/Plan:     1. Pre-op testing  Patient's been optimized for surgery.  Recommend close clinical monitoring post surgery due to his obstructive sleep apnea  - ECG 12 lead with MUSE  - HM1(CBC and Differential)  - Comprehensive Metabolic Panel  - Urinalysis-UC if Indicated  - APTT(PTT)  - INR  - XR Chest 2 Views  - HM1 (CBC with Diff)  - Electrocardiogram Perform - Clinic  - HM2(CBC w/o Differential)  - Basic Metabolic Panel    2. Morbid obesity (H)      3. URIEL (obstructive sleep apnea)  -Patient instructed to bring his CPAP with him to the hospital    Surgical Procedure Risk: Low (reported cardiac risk generally < 1%)  Have you had prior anesthesia?: Yes  Have you or any family members had a previous anesthesia reaction:  No  Do you or any family members have a history of a clotting or bleeding disorder?: No  Cardiac Risk Assessment: no increased risk for major cardiac complications    Patient's been optimized for surgery    Patient's been optimized for surgery    Functional Status: Partially Dependent: wife  Patient plans to recover at home with family.     Subjective:      Lawson Daniels is a 38 y.o. male who presents for a preoperative consultation.      All other systems reviewed and are negative, other  than those listed in the HPI.    Pertinent History  Do you have difficulty breathing or chest pain after walking up a flight of stairs: No  History of obstructive sleep apnea: Yes: Has a CPAP  Steroid use in the last 6 months: No  Frequent Aspirin/NSAID use: No  Prior Blood Transfusion: No  Prior Blood Transfusion Reaction: NA  If for some reason prior to, during or after the procedure, if it is medically indicated, would you be willing to have a blood transfusion?:  There is no transfusion refusal.    Current Outpatient Prescriptions   Medication Sig Dispense Refill     cyanocobalamin, vitamin B-12, 1,000 mcg Subl Place 1,000 mcg under the tongue daily.       fexofenadine (ALLEGRA) 180 MG tablet Take 180 mg by mouth.       multivit-mins-ferrous gluconat 9 mg iron/15 mL Liqd Take 15 mL by mouth 2 (two) times a day.        [START ON 7/31/2018] omeprazole (PRILOSEC) 20 MG capsule Take 1 capsule (20 mg total) by mouth daily. For the first 6 weeks, open capsule & sprinkle contents into liquids or onto food. 90 capsule 0     No current facility-administered medications for this visit.       ROS: All 12 systems reviewed.  Notable for history of PACs, obstructive sleep apnea, and obesity.  He also has a past history of fatty liver and migraine headaches.    Allergies   Allergen Reactions     Demerol [Meperidine] Nausea And Vomiting     Sumatriptan Succinate      Annotation: heartburn, dizziness, upset stomach, would not want to use it again       Tramadol Nausea And Vomiting     Annotation: when broke toe, given tramadol, had nausea and vomiting from that         Patient Active Problem List   Diagnosis     Morbid obesity with BMI of 40.0-44.9, adult (H)     Acquired bilateral flat feet     Allergic rhinitis     PAC (premature atrial contraction)     Edema     Venous hypertension of lower extremity, bilateral     Valgus deformity of both feet     Snoring     Venous insufficiency of both lower extremities       Past Medical  History:   Diagnosis Date     Abnormal ECG     PACs     Fatty liver      Hx of migraines     monthly     Jaw fracture (H) 1993     Lower leg edema     compression socks. sees Dr. Ramos     Morbid obesity with BMI of 40.0-44.9, adult (H)      PAC (premature atrial contraction) 2/24/2016     Pes Planus      Radius head fracture December 2008    left       Past Surgical History:   Procedure Laterality Date     CHOLECYSTECTOMY  May 2006    stones.     LAPAROSCOPIC CHOLECYSTECTOMY  May 2006    [place], [place]- [facility]     LASIK Bilateral 2015     VASECTOMY  4/30/2010    Dr Pickering at [facility]       Social History     Social History     Marital status:      Spouse name: Mitchel     Number of children: 2     Years of education: N/A     Occupational History      [employer]     Social History Main Topics     Smoking status: Former Smoker     Packs/day: 2.00     Years: 15.00     Types: Cigarettes     Start date: 8/23/1995     Quit date: 8/23/2006     Smokeless tobacco: Never Used     Alcohol use 0.0 - 1.2 oz/week     0 - 2 Cans of beer per week      Comment: now and then     Drug use: No     Sexual activity: Yes     Partners: Female     Birth control/ protection: Surgical      Comment: 2 kids     Other Topics Concern     Not on file     Social History Narrative    .  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       Patient Care Team:  Aidan Comer MD as PCP - General          Objective:     There were no vitals filed for this visit.      Physical Exam:  Physical Exam  Constitutional:    --Vitals as above  --No acute distress  Eyes-  --Sclera noninjected  --Lids and conjunctiva normal  ENT-  --TMs clear  --Sclera noninjected  --Pharynx not erythematous  Neck-  --Neck supple with no cervical lymphadenopathy  Lungs-  --Clear to Auscultation  Heart-  --Regular rate and rhythm  Abdomen--  --Soft, non-tender, non-distended  Skin-  --Pink and dry  Psych-  --Alert  and oriented  --Normal affect    There are no Patient Instructions on file for this visit.    EKG: Normal sinus rhythm and no ST or T-wave changes    Labs:  Labs pending at this time.  Results will be reviewed when available.    Immunization History   Administered Date(s) Administered     Influenza, inj, historic,unspecified 09/15/2016     Influenza, seasonal,quad inj 6-35 mos 09/27/2010     Tdap 11/16/2015       Thank you for the opportunity to participate in the care for this patient.  If you have any concerns please do not hesitate to contact me at the [facility] at [phone].        Electronically signed by Edna Hill MD 07/09/18 10:58 AM

## 2021-06-19 NOTE — PROGRESS NOTES
Orders placed for pre-op testing which pt will do on 7/9/2018 at [facility] when he is there for his pre-op H&P.    Dayanna Burgos RN, CBN  [facility]  P [phone]  F [phone]

## 2021-06-19 NOTE — LETTER
Letter by Dwaood Mondragon MD at      Author: Dawood Mondragon MD Service: -- Author Type: --    Filed:  Encounter Date: 5/3/2019 Status: (Other)         Lawson Daniels  [address] [facility] [address]             May 3, 2019         Dear Mr. Daniels,    Below are the results from your recent visit:    Resulted Orders   Electrocardiogram Perform - Clinic   Result Value Ref Range    SYSTOLIC BLOOD PRESSURE  mmHg    DIASTOLIC BLOOD PRESSURE  mmHg    VENTRICULAR RATE 61 BPM    ATRIAL RATE 61 BPM    P-R INTERVAL 150 ms    QRS DURATION 106 ms    Q-T INTERVAL 428 ms    QTC CALCULATION (BEZET) 430 ms    P Axis 8 degrees    R AXIS 17 degrees    T AXIS 44 degrees    MUSE DIAGNOSIS       Sinus rhythm with Premature atrial complexes  Incomplete right bundle branch block  Borderline ECG  When compared with ECG of 09-JUL-2018 11:41,  No significant change was found  Confirmed by JAIR BAEZ MD LOC:[facility]J ([id number]) on 5/2/2019 9:31:50 AM         Lawson,  I reviewed your labs including the blood work, urine test, chest x-ray and EKG and all of those results look satisfactory for you to proceed with surgery later this month.  You should be able to see those results in my chart.  The results from the EKG are listed above.  I hope that you are surgery goes well and from my standpoint you are okay to go ahead with the proposed anesthesia and surgical procedure.    Please call with questions or contact us using [facility].    Sincerely,        Electronically signed by Dawood Mondragon MD

## 2021-06-19 NOTE — PROGRESS NOTES
Time in: 1:00p /Time out: 2:00p   Pt's Initial Weight: 343.2 lbs  Weight: 306 lb (138.8 kg)  Weight loss from initial: 37.2  % Weight loss: 10.84 %  BMI: Body mass index is 42.38 kg/(m^2).    Weight goal: At or below initial weight     Pt presents for nutrition education class for liquid diets. Educated pt on 2-week pre-op and 1-week post-op liquid diets. Discussed appropriate liquids and demonstrated portions for each of the food groupings during each diet phase. Reviewed appropriate calories/protein/fluid goals during 2-week pre-op liquid diet. Educated on correct vitamins/minerals to take after surgery in correct dosage and frequency. Provided grocery list and sample menu plan for each diet stage, as well as unflavored protein powder samples.      Pt will begin 2-week pre-op liquid diet 7/16/2018, will do clear liquids the day before surgery. Pt is scheduled for LSG on 7/30/2018, and will then follow 2-week post-op liquid diet. Pt will f/u with RD 1-week post-op for further diet advancement.

## 2021-06-19 NOTE — PROGRESS NOTES
Patient attended group class to review pre-op instructions for upcoming surgery.  Discussed admission process and hospital course.  Pharmacy information packet given and explained. Patient was given exercises to work on post-op for maintaining muscle mass and strengthening that was created by Physical Therapy.  Bariatric quiz reviewed. Rationale for correct answers given and pt. verbalized understanding. Discharge instructions, information card and follow up appointments given and reviewed with pt at this time and patient verbalized understanding.      Pt is scheduled for his pre-op H&P at [facility] on 7/9/2018 prior to class and he will do his pre-op testing there.      Dyaanna Burgos RN, N  [facility]  P [phone]  F [phone]

## 2021-06-20 NOTE — PROGRESS NOTES
Non-surgical Weight Loss Follow Up Diet Evaluation    Assessment:  This patient is a 38 y.o. male is being seen today for follow-up non-surgical nutritional evaluation. Today we reviewed the patients current eating habits and level of physical activity, and instructed on the changes that are required for successful weight loss outcomes.    Naltrexone : helping at night     Pt's Initial Weight: 343.2 lbs  Weight: 303 lb (137.4 kg)  Weight loss from initial: 40.2  % Weight loss: 11.71 %  BMI: Body mass index is 41.96 kg/(m^2).     Pt Active Problem List Diagnosis:      Patient Active Problem List   Diagnosis     Morbid obesity with BMI of 40.0-44.9, adult (H)     Acquired bilateral flat feet     Allergic rhinitis     PAC (premature atrial contraction)     Edema     Venous hypertension of lower extremity, bilateral     Valgus deformity of both feet     Snoring     Venous insufficiency of both lower extremities     Estimated RMR ([place]-St Jeor equation): 2333 calories  Protein requirements (.5grams to .9grams per pound IBW, 20-30% of calories, minimum of 60-80gm per day):   grams    Progress made since last visit: Pt is doing a great job with protein intake and increasing physical activity. Pt would still like to continue the non-surgery route. Will continue to follow-up on his weight loss journey.  Concerns: Pt meal sizes are increasing and hunger is increasing with large amounts of physical activity (discussed).     Diet Recall/Time:   Breakfast: protein drink and granola bar (30g)  Am Snack: protein drink OR fruit cup (30g)  Lunch:  veggie burrito (15g)   Pm snack: protein drink (30g)  Dinner: Pro/Veg/CHO (21g)   HS Snack: pretzel chips and hummus   -boredom hunger in the evening - discussed  Protein: 100oz     Recommended limiting eating out to no more than 2x/week.  Patient and I reviewed the importance of eating three consistent meals per day; as well as meal timing to be spaced 4-5 hours apart.  Snack  choices: 100-150 calories (1-2x/day if physically hungry), incorporating a fruit/vegetable w/ protein source.    Portion Sizes problematic? YES per patient/diet recall  Encouraged slowing meal times down, 20-30 minutes, chewing to applesauce consistency.   To aid in proper portion control and slow meal time down discussed consuming meals off smaller plates, use toddler/children utensils and set utensils down after each bite.    Protein, vegetables/fruits, carbohydrates:   The patient and I discussed the importance of including lean/low fat protein at each meal and limiting carbohydrate intake to less than 25% of plate volume.     Exercise  Pt has been strength training 3X/week   Cardio on other days    Pt's understands that 45-60 minutes of daily activity is an important part of weight loss success.   Encouraged pt to incorporate  strength training exercise in addition to cardiovascular exercise most days of the week.    PES statement:     1. (NC-3.3.5) Obese, class III, BMI ?40 related to physical inactivity as evidenced by Infrequent, low-duration and or low intensity physical activity; and Large amounts of sedentary activities; no structured physical activity regimen    Intervention:  Discussion:  1. BS Management  2. Exercise and HIIT  3. Mental Hunger  4. Plate Method: The patient and I discussed the importance of including lean/low  fat protein at each meal and limiting carbohydrate intake to less  than 25% of plate volume.  Instructions/Goals:   1. Include 25gm protein at each meal.  2. Increase vegetable/fruit intake, by having a vegetable or fruit with each meal daily. Recommended pt to increase vegetable/fruit intake to 4-5 servings daily.  3. Increase fluid intake to 64oz daily: choose plain or calorie/alcohol-free beverages.  4. Incorporate daily structured activity, 45-60 minutes most days of the week  5. Read food labels more consistently: keeping total fat grams <10, total sugar grams <10, fiber >3gm  per serving.   6. Practice plate method: 1/2 plate lean/low fat protein source, vegetable/fruit, <25% of plate complex carbohydrates.  7. Carbohydrates from grain sources at meal times to be no more than 1 Carb Choice, ie: 15-20 gm total carbohydrate per serving  8. Practice eating off of smaller plates/bowls, chewing to applesauce consistency, taking 20-30 minutes to eat in a calm/relaxed environment without distractions of tv/email/cell phone.    Handouts Provided:  50 Things to do Instead of Snack  Plate Method  Snack Matrix    Monitor/Evaluation:    Pt will f/u in one month with bariatrician, and f/u in two months with RD.    Plan for next visit with RD:  GOALS:  1) register for 5k  2) balance snack IF hungry    Time In: 9:00a  Time Out: 9:30a

## 2021-06-22 NOTE — PROGRESS NOTES
Dear Dr. Comer, Aidan RIBEIRO MD  [address] [facility]  [facility]  [address]  [place] [address],    Thank you for the opportunity to participate in the care of Lawson Daniels.     He is a 38 y.o. y/o male patient who comes to the sleep medicine clinic for follow up.  Lawson presents for his annual follow-up.  He states that he likes the current pressure settings away they are.  His quality of sleep is much better with using a CPAP compare to not using it.  He states that he does not need that many hours of sleep and the hours of sleep that he is getting is sufficient.    Compliance Download data for 30 days:  Pressure setting: APAP 11-14 CWP  Residual AHI: 1.7 events per hour  Leak: Minimal  Compliance: 37%  Mask Tolerance: Good  Skin irritation: None      Past Medical History:   Diagnosis Date     Abnormal ECG     PACs     Fatty liver      Hx of migraines     monthly     Jaw fracture (H) 1993     Lower leg edema     compression socks. sees Dr. Ramos     Morbid obesity with BMI of 40.0-44.9, adult (H)      PAC (premature atrial contraction) 2/24/2016     Pes Planus      Radius head fracture December 2008    left       Past Surgical History:   Procedure Laterality Date     CHOLECYSTECTOMY  May 2006    stones.     LAPAROSCOPIC CHOLECYSTECTOMY  May 2006    [place] [place]     LASIK Bilateral 2015     VASECTOMY  4/30/2010    Dr Pickering at [facility], [place], operated by [facility]       Social History     Socioeconomic History     Marital status:      Spouse name: Micthel     Number of children: 2     Years of education: Not on file     Highest education level: Not on file   Social Needs     Financial resource strain: Not on file     Food insecurity - worry: Not on file     Food insecurity - inability: Not on file     Transportation needs - medical: Not on file     Transportation needs - non-medical: Not on file   Occupational History     Occupation:      Employer: [employer]  "  Tobacco Use     Smoking status: Former Smoker     Packs/day: 2.00     Years: 15.00     Pack years: 30.00     Types: Cigarettes     Start date: 1995     Last attempt to quit: 2006     Years since quittin.3     Smokeless tobacco: Never Used   Substance and Sexual Activity     Alcohol use: Yes     Alcohol/week: 0.0 - 1.2 oz     Comment: now and then     Drug use: No     Sexual activity: Yes     Partners: Female     Birth control/protection: Surgical     Comment: 2 kids   Other Topics Concern     Not on file   Social History Narrative    .  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       Current Outpatient Medications   Medication Sig Dispense Refill     fexofenadine (ALLEGRA) 180 MG tablet Take 180 mg by mouth.       multivit-mins-ferrous gluconat 9 mg iron/15 mL Liqd Take 15 mL by mouth 2 (two) times a day.        naltrexone (DEPADE) 50 mg tablet Start half tab daily for 10 days then increase to half tab twice daily (breakfast and supper). 60 tablet 0     No current facility-administered medications for this visit.        Allergies   Allergen Reactions     Demerol [Meperidine] Nausea And Vomiting     Sumatriptan Succinate      Annotation: heartburn, dizziness, upset stomach, would not want to use it again       Tramadol Nausea And Vomiting     Annotation: when broke toe, given tramadol, had nausea and vomiting from that         Physical Exam:  /80 (Patient Site: Right Arm, Patient Position: Sitting, Cuff Size: Adult Large)   Pulse 78   Ht 5' 11.25\" (1.81 m)   Wt (!) 323 lb 3.2 oz (146.6 kg)   SpO2 96%   BMI 44.76 kg/m    BMI:Body mass index is 44.76 kg/m .   GEN: NAD, obese  Psych: normal mood, normal affect    Labs/Studies:     I reviewed the efficacy and compliance report from his device. Data summarized on the HPI and the CPAP compliance flow sheet.     Lab Results   Component Value Date    WBC 11.8 (H) 2018    HGB 16.2 2018    HCT 48.5 2018    MCV 91 2018    PLT " 303 07/09/2018         Chemistry        Component Value Date/Time     07/09/2018 1127    K 4.1 07/09/2018 1127     (H) 07/09/2018 1127    CO2 27 07/09/2018 1127    BUN 11 07/09/2018 1127    CREATININE 0.90 07/09/2018 1127    GLU 95 07/09/2018 1127        Component Value Date/Time    CALCIUM 9.8 07/09/2018 1127    ALKPHOS 79 07/09/2018 1127    AST 21 07/09/2018 1127    ALT 40 07/09/2018 1127    BILITOT 0.9 07/09/2018 1127            Lab Results   Component Value Date    FERRITIN 236 01/12/2018            Assessment and Plan:  In summary Lawson Daniels is a 38 y.o. year old male who is here for appliance download review.    1.  Obstructive sleep apnea on CPAP  I encouraged the patient to try to increase his hours of usage and so far as he can.  Since he is satisfied with his current pressure settings I will keep him away they are.  I welcome him to follow-up with me in 2 years.  2.  Other sleep disturbance     Patient verbalized understanding of these issues, agrees with the plan and all questions were answered today. Patient was given an opportuntity to voice any other symptoms or concerns not listed above. Patient did not have any other symptoms or concerns.      Patient told to return in 24 months. Patient instructed to stop at  to schedule appointment before leaving today.    Joseph Alaniz DO  Board Certified in Internal Medicine and Sleep Medicine  [facility].    I spent a total of 5 minutes of face-to-face encounter and more than 50% of the encounter was used for counseling or coordination of care.    (Note created with Dragon voice recognition and unintended spelling errors and word substitutions may occur)

## 2021-06-25 NOTE — PATIENT INSTRUCTIONS - HE
Plan:  1. Welcome back, we'll re-enroll in the surgical program. Check with Freda Wheeler today on any new issues with your insurance change and how much of your previous work last year, counts towards any structured weight loss needs for your new plan.    2. Continue cpap. Bring to your surgery date.    3. Keep weight below 331 lbs, feel free to lose as much as possible prior to surgery. Continue naltrexone and discontinue 2-3 weeks prior to any surgery.    4. Continue working fitness, aiming for at least 150minutes/week of moderate exercise weekly and at least 2 days weekly of strength training.    5. Recheck labs to make sure no new changes.    6. Will determine if renewed psychology evaluation is needed.      Before being submitted for insurance approval, you will need the following:    -Clearance by the Psychologist  -Clearance by the dietitian  -Attend Support Group ([address] [place] [address] at [facility]) 2nd Tuesday of the month 6:30-8pm. Make sure to sign in.  -Routine Health Care Maintenance must be up to date (mammograms yearly after age 40, paps as recommended by your primary provider,  colonoscopy after age 50, earlier if high risk.  -If you are on estrogen, estrogen will be discontinued one month prior to surgery. It may be resumed one month after surgery unless otherwise advised to limit blood clotting risks.  -Pre Operative Lab work-ordered today.    -Structured weight loss visits IF mandated by your insurance carrier or bariatrician.  -Surgeon consult as we get nearer to potential surgery or sooner if you have special considerations that may make your surgery more complex.  -If you have sleep apnea you will need to be using CPAP for at least one month before surgery to reduce your risk of breathing problems after surgery. Make sure to bring your CPAP or BiPAP to the hospital at the time of surgery.    -You will need to be tobacco free for 2 months before surgery and remain a non-smoker thereafter. If you  are currently smoking or have recently quit, your urine will be evaluated for tobacco metabolites pre-operatively.  Smoking is a major risk factor for developing ulceration of your surgical sites and potential severe complications.    -If you are on insulin, you might be referred to an endocrinologist who will manage your insulin during the liquid diet and around the time of surgery. This endocrinologist does not replace your primary provider or your endocrinologist.   -You will need an exercise plan which includes MOVE, ie., walking and MUSCLE, ie.,calisthenics, bands, weight, machines, etc...Maintenance of long term weight loss and quality of life is much improved with regular exercise as the weight comes off.  ______________________________________________________________________    Remember that after your bariatric surgery, vitamin supplementation is a lifelong need.    You will take:    B-12 300-500mcg or higher sublingual (under the tongue) daily or by 1000mcg injection 1-2X/month  D3:  3000- 5000 IUs daily   Multivitamin containing 18mg of iron twice a day  Calcium citrate 1 or 2 daily    To keep your weight off and your vitamin levels up, follow-up is important.    Your labs will be monitored every 6 months for the first two years (every 3 months if you had a duodenal switch) and yearly thereafter.    To avoid ulcers in your stomach avoid tobacco forever, alcohol in excess, caffeine in excess and anti-inflammatories (NSAIDS)  (Aspirin, Ibuprofen, Naproxen and similar medications). Tylenol is fine at usual doses on the box.    If you are told by your physician take Aspirin to protect your heart or for another reason, make sure to take omeprazole or similar medication (protonix, nexium, prevacid) to protect your stomach.    Remember that alcohol affects you differently after bariatric surgery. If you have even ONE drink DO NOT DRIVE due to rapid absorption and fast spiking of blood alcohol levels within  minutes of consumption.     Slowly Increasing your exercise capacity such that 3-6 months after your surgery you're tolerating 150-250 minutes of exercise weekly will help optimize your metabolism and improve the chance of good weight loss maintenance.

## 2021-06-25 NOTE — PROGRESS NOTES
Progress Notes by Fatimah Dorman MD at 2/21/2017 12:20 PM     Author: Fatimah Dorman MD Service: -- Author Type: Physician    Filed: 2/21/2017  1:10 PM Encounter Date: 2/21/2017 Status: Signed    : Fatimah Dorman MD (Physician)       Provider wore a mask during this visit.   Subjective:   Lawson Daniels is a 37 y.o. male  Roomed by: Radha    Accompanied by Spouse      Chief Complaint   Patient presents with   ? Cough     Seen in 1/31 for sinusitis, no real improvement   ? Fever   Treated with augmentin for sinusitis and right ear infection. Says right ear feels good. But on 2/18 started to have fatigue, then developed a 101 fever, sweats, body aches, coughing, nasal congestion, chest congestion and loose stools. Denies any SOB, feeling some chest discomfort with coughing. Denies headache, nausea, vomiting, or belly pain.     Review of Systems  Const - Resp - see HPI  Allergies   Allergen Reactions   ? Demerol [Meperidine] Nausea And Vomiting   ? Sumatriptan Succinate      Annotation: heartburn, dizziness, upset stomach, would not want to use it again     ? Tramadol Nausea And Vomiting     Annotation: when broke toe, given tramadol, had nausea and vomiting from that         Current Outpatient Prescriptions:   ?  DEXTROMETHORPHAN HBR (VICKS DAYQUIL COUGH ORAL), Take by mouth., Disp: , Rfl:   ?  fexofenadine (ALLEGRA) 180 MG tablet, Take 180 mg by mouth., Disp: , Rfl:   ?  ranitidine (ZANTAC) 150 MG tablet, Take 1 tablet (150 mg total) by mouth 2 (two) times a day., Disp: 60 tablet, Rfl: 1  Patient Active Problem List   Diagnosis   ? Morbid obesity with BMI of 40.0-44.9, adult   ? Allergic rhinitis   ? PAC (premature atrial contraction)   ? Edema     Medical History Reviewed  Objective:     Vitals:    02/21/17 1216   BP: 132/84   Pulse: 80   Resp: 16   Temp: 99.4  F (37.4  C)   TempSrc: Oral   SpO2: 96%   Weight: (!) 323 lb 11.2 oz (146.8 kg)   Gen - Pt in NAD  Eyes - Conjunctiva non injected, no  drainage  Face - non TTP over maxillary - non TTP frontal sinus areas  Ears - external canals - no induration, Right TM - mildly erythematous, Left TM - non injected   Nose - mildly congested  Pharynx - non injected, tonsils 1+ size  Neck - supple, no cervical adenopathy, no masses  Cor - RRR w/o murmur  Lungs - good air entry; no wheezes or crackles noted on auscultation - no coughing noted  Skin - warm, no lesions or rashes noted     Assessment - Plan   1. Influenza-like syndrome  Discussed with patient that there was no clinical evidence of serious bacterial infection such as pneumonia or sinusitis.  Because patient said that his right ear pain had resolved and his right TM was only mildly erythematous, no antibiotics are indicated at this time. Stressed fluids and rest.     At the conclusion of the encounter, assessment and plan were discussed.   All questions were answered.   The patient or guardian acknowledged understanding and was involved in the decision making regarding the overall care plan.    Patient Instructions     1. Keep well hydrated  2. Tylenol or ibuprofen for fever or pain  3. If symptoms have not improved after another 5-7 days, follow up with primary provider  4. If you have any questions, call the clinic number     Influenza  Influenza (the flu) is an infection that affects your respiratory tract (the mouth, nose, and lungs, and the passages between them). Unlike a cold, the flu can make you very ill. And it can lead to pneumonia, a serious lung infection. For some people, especially older adults, young children, and people with certain chronic conditions, the flu can have serious complications and even be fatal.  What Are the Risk Factors for the Flu?  Anyone can get the flu. But youre more likely to become infected if you:    Have a weakened immune system.    Work in a health care setting where you may be exposed to flu germs.    Live or work with someone who has the flu.    Havent received  an annual flu shot.  How Does the Flu Spread?  The flu is caused by viruses. The viruses spread through the air in droplets when someone who has the flu coughs, sneezes, laughs, or talks. You can become infected when you inhale these viruses directly. You can also become infected when you touch a surface on which the droplets have landed and then transfer the germs to your eyes, nose, or mouth. Touching used tissues, or sharing utensils, drinking glasses, or a toothbrush with an infected person can expose you to flu viruses, too.  What Are the Symptoms of the Flu?  Flu symptoms tend to come on quickly and may last a few days to a few weeks. They include:    Fever usually higher than 101 F  (38.3 C) and chills    Sore throat and headache    Dry cough    Runny nose    Tiredness and weakness    Muscle aches  Factors That Can Make Flu Worse  For some people, the flu can be very serious. The risk of complications is greater for:    Children under age 5.    Adults 65 years of age and older.    People with a chronic illness, such as diabetes or heart, kidney, or lung disease.    People who live in a nursing home or long-term care facility.   How Is the Flu Treated?  Influenza usually improves after 7 days or so. In some cases, your health care provider may prescribe an antiviral medication. This may help you get well sooner. For the medication to help, you need to take it as soon as possible (ideally within 48 hours) after your symptoms start. If you develop pneumonia or other serious illness, hospital care may be needed.  Easing Flu Symptoms    Drink lots of fluids such as water, juice, and warm soup. A good rule is to drink enough so that you urinate your normal amount.    Get plenty of rest.    Ask your health care provider what to take for fever and pain.    Call your provider if your fever rises over 101 F (38.3 C) or you become dizzy, lightheaded, or short of breath.  Taking Steps to Protect Others    Wash your hands  often, especially after coughing or sneezing. Or, clean your hands with an alcohol-based hand  containing at least 60 percent alcohol.    Cough or sneeze into a tissue. Then throw the tissue away and wash your hands. If you dont have a tissue, cough and sneeze into the crook of your elbow.    Stay home until at least 24 hours after you no longer have a fever or chills. Be sure the fever isnt being hidden by fever-reducing medication.    Dont share food, utensils, drinking glasses, or a toothbrush with others.    Ask your health care provider if others in your household should receive antiviral medication to help them avoid infection.  How Can the Flu Be Prevented?    One of the best ways to avoid the flu is to get a flu vaccination each year. Viruses that cause the flu change from year to year. For that reason, doctors recommend getting the flu vaccine each year, as soon as it's available in your area. The vaccine may be given as a shot or as a nasal spray. Your health care provider can tell you which vaccine is right for you.    Wash your hands often. Frequent handwashing is a proven way to help prevent infection.    Carry an alcohol-based hand gel containing at least 60 percent alcohol. Use it when you dont have access to soap and water. Then wash your hands as soon as you can.    Avoid touching your eyes, nose, and mouth.    At home and work, clean phones, computer keyboards, and toys often with disinfectant wipes.    If possible, avoid close contact with others who have the flu or symptoms of the flu.  Handwashing Tips  Handwashing is one of the best ways to prevent many common infections. If youre caring for or visiting someone with the flu, wash your hands each time you enter and leave the room. Follow these steps:    Use warm water and plenty of soap. Rub your hands together well.    Clean the whole hand, under your nails, between your fingers, and up the wrists.    Wash for at least  15 seconds.    Rinse, letting the water run down your fingers, not up your wrists.    Dry your hands well. Use a paper towel to turn off the faucet and open the door.  Using Alcohol-Based Hand   Alcohol-based hand  are also a good choice. Use them when you dont have access to soap and water. Follow these steps:    Squeeze about a tablespoon of gel into the palm of one hand.    Rub your hands together briskly, cleaning the backs of your hands, the palms, between your fingers, and up the wrists.    Rub until the gel is gone and your hands are completely dry.  Preventing Influenza in Healthcare Settings  The flu is a special concern for people in hospitals and long-term care facilities. To help prevent the spread of flu, many hospitals and nursing homes take these steps:    Health care providers wash their hands or use an alcohol-based hand  before and after treating each patient.    People with the flu have private rooms and bathrooms or share a room with someone with the same infection.    High-risk patients who dont have the flu are encouraged to get the flu and pneumonia vaccines.    All health care workers are encouraged or required to get flu shots.        2162-1114 [employer]. [address] [place] [address], [place] [address]. All rights reserved. This information is not intended as a substitute for professional medical care. Always follow your healthcare professional's instructions.

## 2021-06-25 NOTE — PROGRESS NOTES
I was consulted by Aidan Comer MD to evaluate this pt for Bariatric Surgery.    HPI: Lawson Daniels is a 39 y.o. male here today for consideration of metabolic and bariatric surgery. he has had weight problems for the last 15 years. he Has tried multiple weight loss programs in the past with good success.  He has lost up to 50 lbs.  We had discussed weigh loss surgery about 8 months ago but he decided to work on a medical effort with optimizing diet and exercise.  He is also taking Naltrexone.  he carries most of his/her obesity in through their abdomen, and hips.   This pt does not have Hypertention.   This pt does not have GERD.   The pt has sleep apnea which is being treated with a CPAP machine.   This pt does not have diabetes.  .    Allergies:Demerol [meperidine]; Sumatriptan succinate; and Tramadol    Past Medical History:   Diagnosis Date     Abnormal ECG     PACs     Fatty liver      Hx of migraines     monthly     Jaw fracture (H) 1993     Lower leg edema     compression socks. sees Dr. Ramos     Morbid obesity with BMI of 40.0-44.9, adult (H)      PAC (premature atrial contraction) 2/24/2016     Pes Planus      Radius head fracture December 2008    left     Sleep apnea     using cpap       Past Surgical History:   Procedure Laterality Date     CHOLECYSTECTOMY  May 2006    stones.     LAPAROSCOPIC CHOLECYSTECTOMY  May 2006    [place], [place]- [facility]     LASIK Bilateral 2015     VASECTOMY  4/30/2010    Dr Pickering at [facility]       CURRENT MEDS:      Family History   Problem Relation Age of Onset     Breast cancer Mother      Myasthenia gravis Father      Snoring Father      Breast cancer Maternal Grandmother      ALS Paternal Grandmother      No Medical Problems Son      No Medical Problems Daughter         reports that he quit smoking about 12 years ago. His smoking use included cigarettes. He started smoking about 23 years ago. He has a 30.00 pack-year smoking history. he has  "never used smokeless tobacco. He reports that he drinks alcohol. He reports that he does not use drugs.    Review of Systems - 12 point Review of Systems is negative except for the issues mentioned above.    PSYCHIATRIC: he has undergone a lifestyle assessment and has been deemed a good candidate for bariatric surgery by the psychologist.    /79   Pulse 66   Ht 5' 11.25\" (1.81 m)   Wt (!) 331 lb (150.1 kg)   BMI 45.84 kg/m    Body mass index is 45.84 kg/m .    EXAM:  GENERAL: This is a well-developed 39 y.o. male who appears his stated age  HEAD & NECK: Grossly normal.  No palpable thyroid lesions  CARDIAC: RRR without murmur  CHEST/LUNG:  Clear to auscultation  ABDOMEN: Obese.  Nontender.  No hernias or masses appreciated. Scars from a Lap Brittany.  LYMPHATIC:  No significant adenopathy appreciated.    EXTREMITIES: Grossly normal.  No evidence of chronic venous stasis.    NEUROLOGIC: Focally intact  INTEGUMENT: No open lesions or ulcers  PSYCHIATRIC: Normal affect. he has a good grasp on the nature of his obesity and the treatment options.    LABS:  Lab Results   Component Value Date    WBC 10.4 03/20/2019    WBC 10.0 08/05/2014    HGB 16.1 03/20/2019    HCT 47.0 03/20/2019    MCV 90 03/20/2019     03/20/2019     INR/Prothrombin Time      Lab Results   Component Value Date    HGBA1C 5.0 03/20/2019     Lab Results   Component Value Date    ALT 40 07/09/2018    AST 21 07/09/2018    ALKPHOS 79 07/09/2018    BILITOT 0.9 07/09/2018       Assessment/Plan: 39 y.o. male who is an excellent candidate for bariatric and metabolic surgery.  After a careful conversation with the patient it was decided that a  Laparoscopic Sleeve Gastrectomy. would be his best option.       I went over the surgery in detail with him.  I went over the nature of the operation and some of the potential consequences of the surgery.  I went over the expected hospital course and discussed laparoscopic versus open surgery, understanding " that we will plan on doing this laparoscopically with the possibility of having to convert to an open operation.  I went over some of the risks and complications of the operation including, but not limited to, DVT, pulmonary emboli, pneumonia, postoperative bleeding, wound infection, staple line leak, intra-abdominal sepsis, and possible death.  I also went over some of the potential nutritional concerns such as vitamin B-12, iron, vitamin D, vitamin A, calcium and protein deficiencies.  I will also went over the need for lifelong nutritional surveillance.  The patient understands and wants to proceed with surgery.  We will submit for prior authorization.      Marcos Hernández MD  [facility] Department of Surgery  [phone]

## 2021-06-25 NOTE — PROGRESS NOTES
Outpatient Bariatric Medicine Consultation on 3/20/2019, 1:41 PM.    Indication: Medical Bariatric Consultation to [facility]  Primary Provider: Aidan Comer MD  Requesting Physician: Dr. Hernández  Type of Bariatric Surgery: Sleeve Gastrectomy    Impression Lawson Daniels is a 39 y.o. year old male.  Poor functional capacity and musculoskeletal disability due to morbid obesity which satisfies NIH criteria for bariatric surgery. His  Body mass index is 45.84 kg/m .,  Vitals:    03/20/19 1307   Weight: (!) 331 lb (150.1 kg)   .     his Obesity is complicated by,  has a past medical history of Abnormal ECG, Fatty liver, migraines, Jaw fracture (H) (1993), Lower leg edema, Morbid obesity with BMI of 40.0-44.9, adult (H), PAC (premature atrial contraction) (2/24/2016), Pes Planus, Radius head fracture (December 2008), and Sleep apnea..      Special considerations include:  restarating program with new insurance which may affect structured weight loss needs after initailly being scheduled for surgery last July.  We'll have him work with our coordinator to determine any additional structured weight loss needs vs. Just some refresher visits with dietician.  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 His brother is now back and the patient is now in a more stable/less stressful environment.   Has maintained 12 lbs of weight reduction from last year's intro visit but regained nearly 30 lbs from his low level last September as his activity/workouts and eating consistency became more irregular.  He restarted the naltrexone recently and finds it helpful, even if using 3-4 days weekly.     He's doing well w/ cpap and will continue use.    He's due for recheck of labs which were last done Spring of 2018.     Hx of fatty liver disease w/ improved ALT last July. Will  recheck levels, weight loss/maintenance should reduce the risk of progression to cirrhosis.        He was referred to our clinic by his PCP.  Lawson Daniels hopes to achieve improved mobility/activity and resolution of sleep apnea hopefully which was found to be quite severe last year after.    PLAN of ACTION:  1.  . Welcome back, we'll re-enroll in the surgical program. Check with Freda Wheeler today on any new issues with your insurance change and how much of your previous work last year, counts towards any structured weight loss needs for your new plan.    2. Continue cpap. Bring to your surgery date.    3. Keep weight below 331 lbs, feel free to lose as much as possible prior to surgery. Continue naltrexone and discontinue 2-3 weeks prior to any surgery.    4. Continue working fitness, aiming for at least 150minutes/week of moderate exercise weekly and at least 2 days weekly of strength training.    5. Recheck labs to make sure no new changes.    6. Will determine if renewed psychology evaluation is needed.      BARIATRIC RECOMMENDATIONS  Bariatric therapy is indicated for Lawson as a means of modifying his obesity related co-morbidities.  Therapeutic lifestyle changes have not lead to significant and or durable weight loss.  Surgical bariatric therapy is most likely to induce significant weight loss, promote long-term weight maintenance and lead to clinical improvement and/or resolution of his weight related co-morbidities.   -Medical Nutrition Therapy is indicated including comprehensive guidance of nutrition and lifestyle management.  -A Bariatric Psychological Assessment and clearance is indicated.  -Screening Bariatric Laboratory studies are indicated.  -Currency of Routine Healthcare Maintenance is indicated.  -Physical Activity Guidance was provided. Follow up of recommendations will be provided.    PERIOPERATIVE RECOMMENDATIONS  CARDIAC: Cardiac consultation and clearance will be required of patients  with significant cardiac disease and/or multiple cardiac risk factors.  PULMONARY: Pre-operative therapy with CPAP/BIPAP is indicated for a minimum of one month for patients with sleep apnea. Complete tobacco abstinence for two months pre-operatively and indefinitely thereafter is required. Nicotine is a major cause/risk for potentially severe ulcers/complications following bariatric surgery.  RENAL: Diuretics may need to be discontinued 2 weeks before surgery at the time of liquid diet if the patient is on them at that time.  ENDOCRINE: Optimizing perioperative glycemic control is indicated. Our goal is an AIC of 8% or less at the time of surgery for optimal healing. Patients using insulin may be referred to an endocrinologist for perioperative insulin management if they don't have appropriate control.  GASTROINTESTINAL: Evaluation of the esophagus and stomach by EGD and/or UGI series will be considered in patients with severe GERD, previous weight loss surgery, or other indication.  GYNECOLOGIC: For patients on Estrogen- Estrogen will be discontinued 4 weeks prior to surgery and resumed 4 weeks after surgery unless otherwise advised. Reliable contraception is required post-operatively for 1 year for women of childbearing age. DEPOT PROVERA is contraindicated due to its association with weight gain. Post-operative birth control plan is NA.  MUSCULOSKELETAL/RHEUMATOLOGIC: NSAIDS are contraindicated following surgery and lifelong abstinence is indicated. When indicated for cardioprotection or otherwise, patients should use an enteric coated ASA and concomitant proton pump inhibitor.  HEMATOLOGIC: Risks of deep venous thromboembolism have been assessed. Patients with history of DVT/PE or current anti-coagulation will be placed on the High Risk DVT Prophylaxis Protocol. Objections (if any) to receiving blood products if necessary have been documented.  DENTAL: Reasonable and functional dentition is indicated in order  to chew food to applesauce consistency post-operatively.  NUTRITIONAL: Pre and post-operative nutritional and lifestyle modification guidance is indicated. Pre-operative weight reduction can reduce liver volume, improving technical aspects of surgery.    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   He has had several past supervised and unsupervised weight loss attempts  The most weight lost was: 60 lbs with biking and running, self guided. Worked up to a half marathon.  Unfortunately there was not durable weight maintenance.  History of bulimia, anorexia, or binge eating disorder? no  If Present has eating disorder been in remission at least 3 years? na  Night time eating? No.     Dietary History  Meals per day: 3  Snacks: one AM and evening.  Typical Snack: hard boiled eggs and popcorn.  Who does the grocery shopping? shared  Who does the cooking? Shared, mostly wife. He'll grill.  A typical meal includes: supper is hotdish.  Who does the grocery shopping? Mostly wife/shared more since last year  Who does the cooking? Mostly wife. He grilled.   A typical meal includes: meat/veggie  Regular Pop: no  Juice: no  Caffeine: coffee. Finals week, doing some training for new job as [employer] .  Amount of restaurant eating per week: limited.  Eating a the table with the TV off? yes    Physical Activity Patterns  Current physical activity routine includes: walking, bikes getting tuned up.    Limitations from being physically active on a regular basis includes: no    He describes his general health as: good    Past Medical History  Past Medical History:   Diagnosis Date     Abnormal ECG     PACs     Fatty liver      Hx of migraines     monthly     Jaw fracture (H) 1993     Lower leg edema     compression socks. sees Dr. Ramos     Morbid obesity with BMI of 40.0-44.9, adult (H)      PAC (premature atrial  "contraction) 2/24/2016     Pes Planus      Radius head fracture December 2008    left     Sleep apnea     using cpap     Patient Active Problem List   Diagnosis     Morbid obesity with BMI of 40.0-44.9, adult (H)     Acquired bilateral flat feet     Allergic rhinitis     PAC (premature atrial contraction)     Edema     Venous hypertension of lower extremity, bilateral     Valgus deformity of both feet     Snoring     Venous insufficiency of both lower extremities     HTN: no meds  Dyslipidemia: no meds  URIEL: cpap use regularly  Obesity Hypoventilation: NO  DM2: no DM1: no DX: no Most recent AIC: 5.7% last January.   Neuropathy: no  Nephropathy: no  Retinopathy: no  IFG or \"pre-DM\": no  MI: no  CVA:no  CHF: no  Previous cardiac testing includes: no  Cancers: no  Kidney Disease: no  DVT: no  PE: no  Colitis: no  Crohn's: no  IBS: no  PUD: no  Fatty Liver: yes  Abnormal LFTs: slight ALT elevation int he past was improved w/ weight loss last July.  Hepatitis: no  Asthma: no  Bronchitis: no  Pneumonia: no  Other Lung Problems: no  Back Pain:no  DDD: no  Gout: no  Fibromyalgia: no  Severe Headaches: improving lately.  Seizures: na If so, last seizure: na  Pseudotumor: no  PCOS: no  Menstrual Irregularity: no  Menorrhagia: no  Infertility: na  Thyroid problems: no  Thyroid medications: no  Glaucoma: no  HIV positive: NO  MRSA/VRE history: no  History of Blood transfusion: no  Anemia: no    Medications   Current Outpatient Medications   Medication Sig Dispense Refill     fexofenadine (ALLEGRA) 180 MG tablet Take 180 mg by mouth.       multivit-mins-ferrous gluconat 9 mg iron/15 mL Liqd Take 15 mL by mouth 2 (two) times a day.        naltrexone (DEPADE) 50 mg tablet Start half tab daily for 10 days then increase to half tab twice daily (breakfast and supper). 60 tablet 0     No current facility-administered medications for this visit.      Allergies   Demerol [meperidine]; Sumatriptan succinate; and Tramadol  Past Surgical " History  Past Surgical History:   Procedure Laterality Date     CHOLECYSTECTOMY  May 2006    stones.     LAPAROSCOPIC CHOLECYSTECTOMY  May 2006    [facility]     LASIK Bilateral 2015     VASECTOMY  2010    Dr Pickering at [facility]     History of problems with anesthesia: no issues w/ gallbladder surgery  History of Malignant Hyperthermia: NO    Family History  family history includes ALS in his paternal grandmother; Breast cancer in his maternal grandmother and mother; Myasthenia gravis in his father; No Medical Problems in his daughter and son; Snoring in his father.    Social History  Social History     Socioeconomic History     Marital status:      Spouse name: Mitchel     Number of children: 2     Years of education: None     Highest education level: None   Occupational History     Occupation:      Employer: [employer]   Social Needs     Financial resource strain: None     Food insecurity:     Worry: None     Inability: None     Transportation needs:     Medical: None     Non-medical: None   Tobacco Use     Smoking status: Former Smoker     Packs/day: 2.00     Years: 15.00     Pack years: 30.00     Types: Cigarettes     Start date: 1995     Last attempt to quit: 2006     Years since quittin.5     Smokeless tobacco: Never Used   Substance and Sexual Activity     Alcohol use: Yes     Alcohol/week: 0.0 - 1.2 oz     Comment: now and then     Drug use: No     Sexual activity: Yes     Partners: Female     Birth control/protection: Surgical     Comment: 2 kids 10-12 yo   Lifestyle     Physical activity:     Days per week: None     Minutes per session: None     Stress: None   Relationships     Social connections:     Talks on phone: None     Gets together: None     Attends Adventism service: None     Active member of club or organization: None     Attends meetings of clubs or organizations: None     Relationship status: None      Intimate partner violence:     Fear of current or ex partner: None     Emotionally abused: None     Physically abused: None     Forced sexual activity: None   Other Topics Concern     None   Social History Narrative    .  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     Work Status: day job, [employer] .      Addiction History  Current or Past history of nicotine, alcohol or substance abuse: quit in 2006  Last used: na  Chemical Dependency Treatment History: no  Chemicals: no  Informed about need to be tobacco free 2 months before and indefinitely after surgery due to risk of Marginal Ulcers.    Psychiatric History  Diagnoses: no  Treated by: no  Psychiatric Hospitalizations: no  Suicide attempts: no  Panic attacks: no  History of Abuse: no      Palliative Medicine History  Involvement in a pain clinic: no    Dental History  Missing teeth: no issues  Pending Dental Work: no  Regular Dental Visits: yes  Dentures/Partials: no              ROS:    Snoring: on cpap now, AHI was over 100 per patient report  Witnessed Apneas on cpap  PND na  [place] Score: na  STOP BANG: na  General  Fatigue: better w/ cpap  Sleep Quality:better on cpap  HEENT  Visual changes: na  Cardiovascular  Murmur: no  Elevated BP: no  Chest Pain with Exertion: no  Dyspnea with Exertion: no  Palpitations: no  Lower Extremity Edema: some ankle swelling occaisonally.  Syncope: no  Blood Clotting Problems:  no  Pulmonary  Shortness of breath at rest: no  Wheezing: no  CPAP use: yes  Gastrointestinal  Trouble swallowing:no  Heartburn: no  HX UGI/EGD: early 2000s.  Abdominal pain: no  Hematochezia: no  Urologic  Hesitancy: no  Urgency: no  Bloody Urine:no  Genitourinary  ED: no  Menorrhagia: na  Dysmenorrhea: na  Neurologic  Severe headache:improved lately  Paresthesias: no  Psychiatric  Moods Stable: yes. Less stress now that not primary care taker for 3 yo niece.  Hallucinations: no  Rheumatologic  Myalgias: no  Arthralgias:  "no  Endocrine  Polydipsia: no  Polyuria: no  Galactorrhea: no   Heat intolerance: yes  Hirsutism: no  Musculoskeletal  Falls: no  Use of cane, crutch or motorized scooter: na  Hematologic  Abnormal Bleeding or Clotting: no  Dermatologic  Skin Tags: few on neckline  Striae: hips  Furuncles/boils: no  Acne: no  Intertrigo: no  Lower Leg ulcers: no      Physical Exam  Vitals: /79 (Patient Site: Right Arm, Patient Position: Sitting, Cuff Size: Adult Large)   Pulse 66   Ht 5' 11.25\" (1.81 m)   Wt (!) 331 lb (150.1 kg)   SpO2 95%   BMI 45.84 kg/m    Height:   Ht Readings from Last 1 Encounters:   03/20/19 5' 11.25\" (1.81 m)     Weight:   Wt Readings from Last 1 Encounters:   03/20/19 (!) 331 lb (150.1 kg)     Height: 5' 11.25\" (1.81 m) (3/20/2019  1:07 PM)  Initial Weight: 331 lbs (3/20/2019  1:07 PM)  Weight: (!) 331 lb (150.1 kg) (3/20/2019  1:07 PM)  Weight loss from initial: 0 (3/20/2019  1:07 PM)  % Weight loss: 0 % (3/20/2019  1:07 PM)  BMI (Calculated): 45.8 (3/20/2019  1:07 PM)  SpO2: 95 % (3/20/2019  1:07 PM)  Waist Circumference (In): 54 Inches (3/20/2019  1:07 PM)  Hip Circumference (In): 48.5 Inches (3/20/2019  1:07 PM)  Neck Circumference (In): 18 Inches (3/20/2019  1:07 PM)    Body mass index is 45.84 kg/m .    General Appearance  No acute distress. Obesity: class III  Alert: yes  Sleepy: no  HEENT  PERRLA, EOMI  Neck  Stout: no nodules No carotid bruits  Airway: patent  Cardiovascular  Rhythm regular Rate Regular  Murmur: none  Pulmonary  [place] Score: na  Lungs clear to ascultation  Abdomen  No rashes.   Post surgical Scars: lap zohreh hx. Nontender. Central adiposity.  Extremities:  Pitting edema: none today but wearing compression athletic socks.  Palpable distal pulses: 2 plus radial  Varicose veins: none evident  Neurologic  Tremors: no  Alert and oriented with intact cognition.   Speech fluent and cranial nerves are grossly intact.  Psychiatric  Thought Content Organized  Mood appears " stable  Endocrine  Moon Facies: NO  Dorsal Thoracic Prominence: NO  Skin tags: few to neckline.   Acanthosis nigricans: no  Dermatologic  Intertrigo: no.    Intake Photos:  No images are attached to the encounter.    LABS on File:    No results found for: 49156  No results found for: [id number]    WBC   Date Value Ref Range Status   07/09/2018 11.8 (H) 4.0 - 11.0 thou/uL Final   08/05/2014 10.0 4.0 - 11.0 thou/uL Final     Hemoglobin   Date Value Ref Range Status   07/09/2018 16.2 14.0 - 18.0 g/dL Final   01/12/2018 15.3 14.0 - 18.0 g/dL Final     Hematocrit   Date Value Ref Range Status   07/09/2018 48.5 40.0 - 54.0 % Final     MCV   Date Value Ref Range Status   07/09/2018 91 80 - 100 fL Final     Platelets   Date Value Ref Range Status   07/09/2018 303 140 - 440 thou/uL Final         AST   Date Value Ref Range Status   07/09/2018 21 0 - 40 U/L Final     ALT   Date Value Ref Range Status   07/09/2018 40 0 - 45 U/L Final     Alkaline Phosphatase   Date Value Ref Range Status   07/09/2018 79 45 - 120 U/L Final     Bilirubin, Total   Date Value Ref Range Status   07/09/2018 0.9 0.0 - 1.0 mg/dL Final     Lipase   Date Value Ref Range Status   09/12/2016 37 0 - 52 U/L Final       INR   Date Value Ref Range Status   07/09/2018 1.05 0.90 - 1.10 Final       Hemoglobin A1c   Date Value Ref Range Status   01/12/2018 5.7 3.5 - 6.0 % Final       TSH   Date Value Ref Range Status   01/12/2018 1.77 0.30 - 5.00 uIU/mL Final     PTH   Date Value Ref Range Status   01/12/2018 37 10 - 86 pg/mL Final     Vitamin D, Total (25-Hydroxy)   Date Value Ref Range Status   01/12/2018 24.2 (L) 30.0 - 80.0 ng/mL Final     Ferritin   Date Value Ref Range Status   01/12/2018 236 27 - 300 ng/mL Final       Triglycerides   Date Value Ref Range Status   01/12/2018 263 (H) <=149 mg/dL Final     Cholesterol   Date Value Ref Range Status   01/12/2018 187 <=199 mg/dL Final     HDL Cholesterol   Date Value Ref Range Status   01/12/2018 33 (L) >=40 mg/dL  Final       Folate   Date Value Ref Range Status   01/12/2018 13.5 >=3.5 ng/mL Final     No components found for: THIAMINE  No results found for: TESTOSTERONE  Vitamin B-12   Date Value Ref Range Status   01/12/2018 505 213 - 816 pg/mL Final           Counseled on what to expect regarding the post operative dietary recommendations which include:  -eating 3 meals daily  -eating protein first, getting >60gm protein daily 80gm if duodenal switch  -eating slowly, chewing food well  -avoiding/limiting calorie containing beverages  -drinking water 15-30 minutes before or after meals  -choosing wheat, not white with breads, crackers, pastas, dinora, bagels, tortillas, rice  -limiting restaurant or cafeteria eating to twice a week or less    We discussed the importance of restorative sleep and stress management in maintaining a healthy weight.  We discussed the National Weight Control Registry healthy weight maintenance strategies and ways to optimize metabolism.  We discussed the importance of physical activity including cardiovascular and strength training in maintaining a healthier weight.  We discussed the importance of lifelong vitamin supplementation and lifelong follow-up.    Lawson TERRAZAS Frances was reminded that, postoperatively,  to avoid marginal ulcers he should avoid tobacco at all, alcohol in excess, caffeine in excess, and NSAIDS (unless indicated for cardioprotection or othewise and opposed by a PPI).     He was also reminded on the lifelong need for vitamin supplementation after bariatric surgery due to post operative malabsorption to maintain adequate nutrition and health.  Vitamin levels will be followed at least annually, more frequent until stability assured.     He was reminded that after bariatric surgery alcohol will affect him differently and he should not drive after consuming even one alcoholic beverage.  Time spent 60 minutes face-to-face with over 50% of the time spent counseling about how excess  weight may affect him health, improving dietary hygiene and habits, details of the bariatric surgery pathway and our expectations and requirements prior to any surgery, and coordination of treatment plan.

## 2021-06-26 NOTE — PROGRESS NOTES
Progress Notes by Fatimah Dorman MD at 11/27/2018 11:50 AM     Author: Fatimah Dorman MD Service: -- Author Type: Physician    Filed: 11/27/2018  1:23 PM Encounter Date: 11/27/2018 Status: Signed    : Fatimah Dorman MD (Physician)       Provider wore a mask during this visit.   Subjective:   Lawson Daniels is a 38 y.o. male  Roomed by: [facility]       Chief Complaint   Patient presents with   ? poss sore throat     x4days fever    Started feeling drained on 11/24. Has been around a young sick niece, and his wife got sick before he did.  Has been having a dry and hacking cough. Admits nasal congestion and yellow nasal drainage. Denies CP, but admits shortness of breath. Feels lots of chest congestion. Coughing has been interrupting his sleep. Has tried Dayquil, Mucinex, Theraflu and Robitussin. . Admits that patient has been eating a lot less, but still drinking and urinating normally.  Denies any recent belly pain, vomiting or diarrhea. Admits headaches. Missed work yesterday and today, and needs a work note.     Review of Systems  See HPI for ROS, otherwise balance of other systems negative    Allergies   Allergen Reactions   ? Demerol [Meperidine] Nausea And Vomiting   ? Sumatriptan Succinate      Annotation: heartburn, dizziness, upset stomach, would not want to use it again     ? Tramadol Nausea And Vomiting     Annotation: when broke toe, given tramadol, had nausea and vomiting from that         Current Outpatient Medications:   ?  fexofenadine (ALLEGRA) 180 MG tablet, Take 180 mg by mouth., Disp: , Rfl:   ?  multivit-mins-ferrous gluconat 9 mg iron/15 mL Liqd, Take 15 mL by mouth 2 (two) times a day. , Disp: , Rfl:   ?  naltrexone (DEPADE) 50 mg tablet, Start half tab daily for 10 days then increase to half tab twice daily (breakfast and supper)., Disp: 60 tablet, Rfl: 0  Patient Active Problem List   Diagnosis   ? Morbid obesity with BMI of 40.0-44.9, adult (H)   ? Acquired bilateral flat feet    ? Allergic rhinitis   ? PAC (premature atrial contraction)   ? Edema   ? Venous hypertension of lower extremity, bilateral   ? Valgus deformity of both feet   ? Snoring   ? Venous insufficiency of both lower extremities     Past Medical History:   Diagnosis Date   ? Abnormal ECG     PACs   ? Fatty liver    ? Hx of migraines     monthly   ? Jaw fracture (H) 1993   ? Lower leg edema     compression socks. sees Dr. Ramos   ? Morbid obesity with BMI of 40.0-44.9, adult (H)    ? PAC (premature atrial contraction) 2/24/2016   ? Pes Planus    ? Radius head fracture December 2008    left     Objective:     Vitals:    11/27/18 1206   BP: 132/82   Pulse: (!) 104   Resp: 18   Temp: 100.4  F (38  C)   TempSrc: Oral   SpO2: 95%   Weight: (!) 318 lb (144.2 kg)   Gen - Pt in NAD  Eyes - Conjunctiva non injected, no drainage  Face - non TTP over frontal sinus areas; non TTP over maxillary areas  Ears - external canals - no induration, Right TM - not injected, Left TM - not injected   Nose - mildly congested, no nasal drainage  Pharynx - not injected, tonsils 1+ size  Neck - supple, no cervical adenopathy, no masses  Cor - RRR w/o murmur  Lungs - Good air entry; no wheezes or crackles noted on auscultation - sporadic dry coughing noted  Skin - no lesions, no rashes noted    Results for orders placed or performed in visit on 11/27/18   Rapid Strep A Screen-Throat   Result Value Ref Range    Rapid Strep A Antigen No Group A Strep detected, presumptive negative No Group A Strep detected, presumptive negative   Influenza A/B Rapid Test- Nasal Swab   Result Value Ref Range    Influenza  A, Rapid Antigen No Influenza A antigen detected No Influenza A antigen detected    Influenza B, Rapid Antigen No Influenza B antigen detected No Influenza B antigen detected   Lab result discussed on day of visit.     Assessment - Plan   Medical Decision Making - No clinical findings indicative of bacterial infection requiring antibiotics, such as  "pneumonia, sinusitis or otitis media were ascertained from today's evaluation. Presentation and clinical findings are consistent with a viral process.     1. Acute viral syndrome    2. Throat pain  - Rapid Strep A Screen-Throat  - Group A Strep, RNA Direct Detection, Throat    3. Chills (without fever)  - Influenza A/B Rapid Test- Nasal Swab    At the conclusion of the encounter, assessment and plan were discussed.   All questions were answered.   The patient or guardian acknowledged understanding and was involved in the decision making regarding the overall care plan.    Patient Instructions   1. Keep well hydrated  2. May alternate Tylenol every 6 hours with ibuprofen every 6 hours as needed for fever or pain  3. If symptoms are not improving over the next week, follow up at your primary clinic  4. If you have any questions, call the clinic number  - it's answered 24/7  - You will be contacted within the next 48 hours ONLY if the confirmatory strep test is positive.   - Antibiotics will be prescribed if indicated.  - No sharing of food or beverage, until 48 hours is past       Patient Education     Viral Syndrome (Adult)  A viral illness may cause a number of symptoms. The symptoms depend on the part of the body that the virus affects. If it settles in your nose, throat, and lungs, it may cause cough, sore throat, congestion, and sometimes headache. If it settles in your stomach and intestinal tract, it may cause vomiting and diarrhea. Sometimes it causes vague symptoms like \"aching all over,\" feeling tired, loss of appetite, or fever.  A viral illness usually lasts 1 to 2 weeks, but sometimes it lasts longer. In some cases, a more serious infection can look like a viral syndrome in the first few days of the illness. You may need another exam and additional tests to know the difference. Watch for the warning signs listed below.  Home care  Follow these guidelines for taking care of yourself at home:    If symptoms " are severe, rest at home for the first 2 to 3 days.    Stay away from cigarette smoke - both your smoke and the smoke from others.    You may use over-the-counter acetaminophen or ibuprofen for fever, muscle aching, and headache, unless another medicine was prescribed for this. If you have chronic liver or kidney disease or ever had a stomach ulcer or GI bleeding, talk with your doctor before using these medicines. No one who is younger than 18 and ill with a fever should take aspirin. It may cause severe disease or death.    Your appetite may be poor, so a light diet is fine. Avoid dehydration by drinking 8 to 12 8-ounce glasses of fluids each day. This may include water; orange juice; lemonade; apple, grape, and cranberry juice; clear fruit drinks; electrolyte replacement and sports drinks; and decaffeinated teas and coffee. If you have been diagnosed with a kidney disease, ask your doctor how much and what types of fluids you should drink to prevent dehydration. If you have kidney disease, drinking too much fluid can cause it build up in the your body and be dangerous to your health.    Over-the-counter remedies won't shorten the length of the illness but may be helpful for cough, sore throat; and nasal and sinus congestion. Don't use decongestants if you have high blood pressure.  Follow-up care  Follow up with your healthcare provider if you do not improve over the next week.  Call 911  Call 911 if any of the following occur:    Convulsion    Feeling weak, dizzy, or like you are going to faint    Chest pain, shortness of breath, wheezing, or difficulty breathing  When to seek medical advice  Call your healthcare provider right away if any of these occur:    Cough with lots of colored sputum (mucus) or blood in your sputum    Chest pain, shortness of breath, wheezing, or difficulty breathing    Severe headache; face, neck, or ear pain    Severe, constant pain in the lower right side of your belly  (abdominal)    Continued vomiting (cant keep liquids down)    Frequent diarrhea (more than 5 times a day); blood (red or black color) or mucus in diarrhea    Feeling weak, dizzy, or like you are going to faint    Extreme thirst    Fever of 100.4 F (38 C) or higher, or as directed by your healthcare provider  Date Last Reviewed: 9/25/2015 2000-2017 [employer]). [address] [place] [address]. All rights reserved. This information is not intended as a substitute for professional medical care. Always follow your healthcare professional's instructions.

## 2021-06-28 NOTE — PROGRESS NOTES
Progress Notes by Pete Castrejon MD at 11/13/2019  8:30 AM     Author: Pete Castrejon MD Service: -- Author Type: Physician    Filed: 11/13/2019 12:00 PM Encounter Date: 11/13/2019 Status: Signed    : Pete Castrejon MD (Physician)       Bariat[facility] Care Clinic Follow Up Visit for Previous Bariatric Surgery   Date of visit: 11/13/2019  Physician: Pete Castrejon MD  Primary Care is Dawood Mondragon MD.  Lawson Daniels   39 y.o.  male    Date of Surgery: 5/13/19  Initial Weight: 343 lbs  Initial BMI: 47.6  Today's Weight:   Wt Readings from Last 1 Encounters:   11/13/19 212 lb 12.8 oz (96.5 kg)     Body mass index is 29.27 kg/m .  Initial Weight: 343 lbs (Simultaneous filing. User may not have seen previous data.)  Weight: 212 lb 12.8 oz (96.5 kg)  Weight loss from initial: 130.2 (Simultaneous filing. User may not have seen previous data.)  % Weight loss: 37.96 % (Simultaneous filing. User may not have seen previous data.)       Assessment and Plan   Assessment: Lawson is a 39 y.o. year old male who is 6 months s/p  Sleeve Gastrectomy with Dr. Hernández.  He has had a durable weight loss of 130 lbs since surgery.  Overall compliance with the Health system Bariatric Surgery Program has been excellent.  He'll be getting his nutritional labs done after today's visit to check supplementation quality.  His severe URIEL is symptomatically improved and no daytime sleepiness or snoring reported currently. His fitness is top notch and he's regularly training for endurance running events and enjoys being able to run up to 2 hours again.   .  Lawson Daniels feels that he has achieved his   preoperative goals for bariatric surgery.    Plan:   Great work, 130 lbs down wonderful fitness and a model patient thus far. Consider adding in some nutrition on your long runs about every 25-35 minutes.     2. Check labs and consider adjustments to your regimen based on results. For now until they come back, you can reduce your  calcium to once daily.  Continue good hydration and protein intake.    3. If daytime sleepiness recurs/snoring or apneic spells redevelops restart your cpap or we'll consider follow up test to determine severity/need for treatment.          1. Postoperative malabsorption  Comprehensive Metabolic Panel    HM2(CBC w/o Differential)    Vitamin D, Total (25-Hydroxy)    Parathyroid Hormone Intact    Vitamin A (Retinol), Serum or Plasma    Vitamin B12    Vitamin B1 (Thiamine), Whole Blood (VIT B1 WB)    Ferritin    Zinc, Serum or Plasma    Glycosylated Hemoglobin A1c    Magnesium    Copper, Serum or Plasma   2. History of sleeve gastrectomy     3. History of sleep apnea      sx resolved w/ 130 lbs of weight loss. CPAP not fitting anymore. will consider repeat PSG at one year post op.       No follow-ups on file.     Bariatric Surgery Review   Interim History/LifeChanges: running big miles again, aiming for AHMET next year and a marathon next year, up to 20-30 miles weekly. Enjoying family exercise bonanza and doing family 5k.    Patient Concerns: cold frequently.     Medication changes: none. Not regularly using cpap.     Appetite (1-10): some loss of satiety more with more hunger lately.    GERD sx at all? None.    Vitamin Intake:   Multivitamin   chewables twice daily   Vitamin D  yes, 5000   Calcium  twice daily often, will reduce.    Vit. B-12    yes     Habits:            Alcohol Intake  not until May   NSAID Use  avoids   Caffeine Use  avoids   Exercise  long distance running and weights at home/gym 2 days weekly. Core at home with wife.   CPAP Use:  no longer using   Birth Control  na/                                            LABS: ordered      LABS:  Lab Results   Component Value Date    WBC 5.7 11/13/2019    HGB 14.6 11/13/2019    HCT 43.3 11/13/2019    MCV 94 11/13/2019     11/13/2019      Lab Results   Component Value Date    XGMKJYUQ91EX 32.4 03/20/2019    Lab Results   Component Value Date    HGBA1C  4.7 11/13/2019      Lab Results   Component Value Date    CHOL 193 03/20/2019    Lab Results   Component Value Date    PTH 40 03/20/2019         Lab Results   Component Value Date    FERRITIN 171 03/20/2019      Lab Results   Component Value Date    HDL 33 (L) 03/20/2019      Lab Results   Component Value Date    RBCSKWYK37 1,530 (H) 03/20/2019    No results found for: 94871   Lab Results   Component Value Date    LDLCALC 99 03/20/2019    Lab Results   Component Value Date    TSH 1.68 03/20/2019    Lab Results   Component Value Date    FOLATE >20.0 03/20/2019      Lab Results   Component Value Date    TRIG 303 (H) 03/20/2019    Lab Results   Component Value Date    ALT 51 (H) 05/01/2019    AST 33 05/01/2019    ALKPHOS 69 05/01/2019    BILITOT 1.1 (H) 05/01/2019    No results found for: TESTOSTERONE     No components found for: CHOLHDL No results found for: 7597   @resusfast(vitamin a: 1)@          Patient Profile   Social History     Social History Narrative    .  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        Past Medical History   Past Medical History:   Diagnosis Date   ? Abnormal ECG     PACs   ? Fatty liver    ? Hx of migraines     monthly   ? Jaw fracture (H) 1993   ? Lower leg edema     compression socks. sees Dr. Ramos   ? Morbid obesity with BMI of 40.0-44.9, adult (H)    ? PAC (premature atrial contraction) 2/24/2016   ? Pes Planus    ? Radius head fracture December 2008    left   ? Sleep apnea     using cpap     Patient Active Problem List   Diagnosis   ? Morbid obesity with BMI of 40.0-44.9, adult (H)   ? Acquired bilateral flat feet   ? Allergic rhinitis   ? PAC (premature atrial contraction)   ? Edema   ? Venous hypertension of lower extremity, bilateral   ? Valgus deformity of both feet   ? Snoring   ? Venous insufficiency of both lower extremities   ? Sleep apnea   ? Morbid obesity due to excess calories (H)     Current Outpatient Medications   Medication Sig   ? calcium citrate (CALCITRATE)  "200 mg (950 mg) tablet Take 1 tablet by mouth 2 (two) times a day.   ? cholecalciferol, vitamin D3, 5,000 unit Tab Take 5,000 Units by mouth daily.   ? cyanocobalamin, vitamin B-12, 1,000 mcg Subl Place 1,000 mcg under the tongue daily.   ? fexofenadine (ALLEGRA) 180 MG tablet Take 180 mg by mouth every evening.          ? pediatric multivitamin-iron (POLY-VI-SOL WITH IRON) chewable tablet Chew 1 tablet 2 (two) times a day.       Past Surgical History  He has a past surgical history that includes LASIK (Bilateral, 2015); Vasectomy (4/30/2010); Laparoscopic cholecystectomy (May 2006); Cholecystectomy (May 2006); and pr lap, lyndon restrict proc, longitudinal gastrectomy (N/A, 5/13/2019).     Examination   /62 (Patient Site: Right Arm, Patient Position: Sitting, Cuff Size: Adult Small)   Pulse (!) 45   Ht 5' 11.5\" (1.816 m)   Wt 212 lb 12.8 oz (96.5 kg)   SpO2 99%   BMI 29.27 kg/m    Height: 5' 11.5\" (1.816 m) (11/13/2019  8:13 AM)  Initial Weight: 343 lbs (Simultaneous filing. User may not have seen previous data.) (11/13/2019  8:13 AM)  Weight: 212 lb 12.8 oz (96.5 kg) (11/13/2019  8:13 AM)  Weight loss from initial: 130.2 (Simultaneous filing. User may not have seen previous data.) (11/13/2019  8:13 AM)  % Weight loss: 37.96 % (Simultaneous filing. User may not have seen previous data.) (11/13/2019  8:13 AM)  BMI (Calculated): 29.3 (11/13/2019  8:13 AM)  SpO2: 99 % (11/13/2019  8:13 AM)  Waist Circumference (In): 40 Inches (11/13/2019  8:13 AM)  Hip Circumference (In): 40.75 Inches (11/13/2019  8:13 AM)  Neck Circumference (In): 16 Inches (11/13/2019  8:13 AM)    General:  Alert and ambulatory,   HEENT:  No conjunctival pallor, moist mucous Membranes, neck is thin. Mallampati 2 airway.  Pulmonary:  Normal respiratory effort, no cough, no audible wheezes/crackles.  CV:  Regular rate and Rhythm, no murmurs, pulses 2 plus  Abdominal: flat, soft, good core muscle c/w workouts. Some redundant pannus without " intertrigo currently apart from small spot that looks recently healed in the supraumbilical skin fold.  Extremities: no tremor or edema.  Skin:  No pallor or jaundice. Mildly dry.  Pscyh/Mood: happy with results.         Counseling:   We reviewed the important post op bariatric recommendations:  -eating 3 meals daily  -eating protein first, getting >60gm protein daily  -eating slowly, chewing food well  -avoiding/limiting calorie containing beverages  -drinking water 15-30 minutes before or after meals  -limiting restaurant or cafeteria eating to twice a week or less    We discussed the importance of restorative sleep and stress management in maintaining a healthy weight.  We discussed the National Weight Control Registry healthy weight maintenance strategies and ways to optimize metabolism.  We discussed the importance of physical activity including cardiovascular and strength training in maintaining a healthier weight.  We discussed the importance of life-long vitamin supplementation and life-long  follow-up.    Lawson was reminded that, to avoid marginal ulcers he should avoid tobacco at all, alcohol in excess, caffeine in excess, and NSAIDS (unless indicated for cardioprotection or othewise and opposed by a PPI).    At least 25 minutes was spent in direct consultation and over 50% of the time devoted to counseling regarding maximizing the benefits of his previous bariatric surgery while minimizing risks of nutritional or structural complications.    Pete Castrejon MD  [facility].  11/13/2019  8:17 AM

## 2021-09-18 ENCOUNTER — HEALTH MAINTENANCE LETTER (OUTPATIENT)
Age: 41
End: 2021-09-18

## 2021-11-07 NOTE — PROGRESS NOTES
Patient was offered choice of vendor and chose [facility].  Patient Lawson Daniels was set up at [facility] on 3/9/18. Patient received a [employer]med [id number] Auto. Pressures were set at 8-16 CM H2O.   Patient s ramp is 6 cm H2O for off and FLEX/EPR is EPR.  Patient received a [employer] Mask name: dreamwear gel  pillow Size med, heated tubing and heated humidifier.    Pacee Her    [FreeTextEntry1] : Fasting labs were performed today . ECG reviewed and WNL  Labs ordered for PE form , including viral screen and Urine drug screen \par Advised to undergo any preventative testing that is overdue . \par Patient will continue healthy eating and exercise and followup in one year for PE\par

## 2021-11-30 ENCOUNTER — HOSPITAL ENCOUNTER (OUTPATIENT)
Dept: CT IMAGING | Facility: CLINIC | Age: 41
Discharge: HOME | End: 2021-11-30
Attending: PHYSICIAN ASSISTANT | Admitting: PHYSICIAN ASSISTANT
Payer: COMMERCIAL

## 2021-11-30 ENCOUNTER — OFFICE VISIT (OUTPATIENT)
Dept: FAMILY MEDICINE | Facility: CLINIC | Age: 41
End: 2021-11-30
Payer: COMMERCIAL

## 2021-11-30 VITALS
TEMPERATURE: 97.8 F | HEART RATE: 48 BPM | DIASTOLIC BLOOD PRESSURE: 60 MMHG | OXYGEN SATURATION: 99 % | BODY MASS INDEX: 29.43 KG/M2 | RESPIRATION RATE: 18 BRPM | WEIGHT: 214 LBS | SYSTOLIC BLOOD PRESSURE: 110 MMHG

## 2021-11-30 DIAGNOSIS — R51.9 NONINTRACTABLE HEADACHE, UNSPECIFIED CHRONICITY PATTERN, UNSPECIFIED HEADACHE TYPE: Primary | ICD-10-CM

## 2021-11-30 PROCEDURE — 99214 OFFICE O/P EST MOD 30 MIN: CPT | Performed by: PHYSICIAN ASSISTANT

## 2021-11-30 PROCEDURE — 70450 CT HEAD/BRAIN W/O DYE: CPT

## 2021-11-30 ASSESSMENT — PAIN SCALES - GENERAL: PAINLEVEL: WORST PAIN (10)

## 2021-11-30 NOTE — PROGRESS NOTES
"  Assessment & Plan:      Problem List Items Addressed This Visit     None      Visit Diagnoses     Nonintractable headache, unspecified chronicity pattern, unspecified headache type    -  Primary    Relevant Orders    CT Head w/o Contrast        Medical Decision Making  Patient presents with ongoing headaches for 1 month with acute worsening today.  Patient's headache has not been previously evaluated and sounds consistent with likely migraines.  Head CT is negative for intracranial hemorrhage and central lesions.  Patient unable to use ibuprofen due to his history of gastric sleeve procedure, thus recommend he continue with Tylenol and cold compresses as needed for headaches.  Patient will follow up closely with PCP for further evaluation of suspected migraines versus other etiology for headache.  Do not see any signs for sinus congestion or bacterial sinusitis on today's exam.  Discussed signs of worsening symptoms and when to follow-up with PCP if no symptom improvement.     Subjective:      Lawson Daniels is a 41 year old male here for evaluation of acute onset severe headache.  Headache is described as 9 out of 10 in severity this morning while patient was exercising.  He was left-sided and described as \"throbbing\".  Associated symptoms included difficulties standing up and nausea.  Patient was exercising and symptoms started.  He otherwise denied shortness of breath, chest pains, muscle weakness, numbness, and tingling.  Headache has since improved as patient has began to rest.  He also notes having similar on and off headaches over the last month.  Patient is not able to follow a pattern or specific trigger to his headache, however they are always left-sided.  Patient had similar headaches 6 years ago that were thought to be due to sinus congestion.  No family history of migraines.  Patient denies having sinus congested, rhinorrhea, sore throat, and cough.  No ear pains.     The following portions of the " patient's history were reviewed and updated as appropriate: allergies, current medications, and problem list.     Review of Systems  Pertinent items are noted in HPI.    Allergies  Allergies   Allergen Reactions     Meperidine Nausea and Vomiting     Sumatriptan      Annotation: heartburn, dizziness, upset stomach, would not want to use it again       Tramadol Nausea and Vomiting     Annotation: when broke toe, given tramadol, had nausea and vomiting from that         Family History   Problem Relation Age of Onset     Breast Cancer Mother      Myasthenia gravis Father      Snoring Father      Breast Cancer Maternal Grandmother      ALS Paternal Grandmother      No Known Problems Son      No Known Problems Daughter        Social History     Tobacco Use     Smoking status: Former Smoker     Packs/day: 2.00     Years: 15.00     Pack years: 30.00     Types: Cigarettes, Cigarettes     Start date: 8/23/1995     Quit date: 8/23/2006     Years since quitting: 15.2     Smokeless tobacco: Never Used   Substance Use Topics     Alcohol use: Not Currently     Alcohol/week: 0.0 - 2.0 standard drinks     Comment: Alcoholic Drinks/day: now and then        Objective:      /60   Pulse (!) 48   Temp 97.8  F (36.6  C)   Resp 18   Wt 97.1 kg (214 lb)   SpO2 99%   BMI 29.43 kg/m    General appearance - alert, well appearing, and in no distress and non-toxic  Ears - bilateral TM's and external ear canals normal  Nose - normal and patent, no erythema, discharge or polyps  Mouth - mucous membranes moist, pharynx normal without lesions  Neck - supple, no significant adenopathy     Lab & Imaging Results    No results found for this or any previous visit (from the past 24 hour(s)).    I personally reviewed these results and discussed findings with the patient.    The use of Dragon/[employer] dictation services was used to construct the content of this note; any grammatical errors are non-intentional. Please contact the author  directly if you are in need of any clarification.

## 2021-11-30 NOTE — PATIENT INSTRUCTIONS
Patient Education     Migraine Headache   A migraine headache is an often severe type of headache. It's different from other types of headaches in that symptoms other than pain occur with the it. For instance, a classic migraine headache means visual symptoms (or aura) such as flashes of light, blind spots or other vision changes, warns you a headache is coming on. Nausea and vomiting, lightheadedness, sensitivity to light or sound, and other visual disturbances are common migraine symptoms. The pain may last from a few hours to several days. It's not clear why migraines occur, but certain factors called triggers can raise the risk of having a migraine attack. A migraine may be triggered by emotional stress or depression, or by hormone changes during the menstrual cycle. Other triggers include certain birth control pills, overuse of migraine medicines, alcohol or caffeine, foods with tyramine such as aged cheese and wine, eyestrain, weather changes, missed meals, or too little or too much sleep.  Home care  Follow these tips when taking care of yourself at home:    Don t drive yourself home if you were given pain medicine for your headache or are having visual symptoms. Instead, have someone else drive you home. Try to sleep when you get home. You should feel much better when you wake up.    Cold can help ease migraine symptoms. Put an ice pack wrapped in a thin towel on your forehead or at the base of your skull. Put heat on the back of your neck to help ease any neck spasm.    Drink only clear liquids or eat a light diet until your symptoms get better. This will help you prevent nausea and vomiting.  How to prevent migraines  Pay attention to what seems to trigger your headache. Try to stay away from the triggers when you can. If you have headaches often, consider keeping a headache diary. In it, write down what you were doing, feeling, or eating in the hours before each headache. Show this to your healthcare  provider to help find the cause of your headaches.  If stress seems to be a trigger for your headaches, figure out what is causing stress in your life. Learn new ways to handle your stress. Ideas include regular exercise, biofeedback, self-hypnosis, yoga, and meditation. Talk with your healthcare provider to find out more information about managing stress. Many books and digital media are also available on this subject.  Tyramine is a substance found in many foods. It can trigger a migraine in some people. These foods contain tyramine:    Chocolate    Yogurt    All cheeses, but especially aged cheeses    Smoked or pickled fish and meat, including herring, caviar, bologna, pepperoni, and salami    Liver    Avocados    Bananas    Figs    Raisins    Red wine  Try staying away from these foods for 1 to 2 months to see if you have fewer headaches.  How to treat future headaches    Take time out at the first sign of a headache, if possible. Find a quiet, dark, comfortable place to sit or lie down. Let yourself relax or sleep.    Put an ice pack wrapped in a thin towel on your forehead or on the area of greatest pain. A heating pad and massage may help if you are having a muscle spasm and tightness in your neck.    If you have been prescribed a medicine to stop a migraine headache, use this at the first warning sign of the headache for best results. First signs may be an aura or pain.    If you have been prescribed a medicine to prevent the headaches, it's important to take the medicine as directed. Many of these medicines may take a few weeks to start preventing headaches, so it's important to not give up on them right away. If you continue to have just as many headaches after taking these medicines for a while, talk with your doctor to see if the dose needs to be changed or if a different medicine is advised.    If you need to take medicine often for your migraine, talk with your healthcare provider about other ways to  prevent your headaches.    Follow-up care  Follow up with your healthcare provider, or as advised. Talk with your provider if you have frequent headaches. He or she can figure out a treatment plan. Ask if you can have medicine to take at home the next time you get a bad headache. This may keep you from having to visit the emergency department in the future. You may need to see a headache specialist (neurologist) if you continue to have headaches.  When to seek medical advice  Call your healthcare provider right away if any of these occur:    Your head pain gets worse, or doesn t get better within 24 hours    You can t keep liquids down (repeated vomiting)    Pain in your sinuses, ears, or throat    Fever of 100.4  F (38  C) or higher, or as directed by your healthcare provider    Stiff neck    Extreme drowsiness, confusion, or fainting    Dizziness, or dizziness with spinning sensation (vertigo)    Weakness or trouble feeling in an arm or leg, or on one side of your face    Trouble talking or seeing  [employer] last reviewed this educational content on 9/1/2019 2000-2021 [employer]. All rights reserved. This information is not intended as a substitute for professional medical care. Always follow your healthcare professional's instructions.

## 2022-03-05 ENCOUNTER — HEALTH MAINTENANCE LETTER (OUTPATIENT)
Age: 42
End: 2022-03-05

## 2022-06-09 ENCOUNTER — OFFICE VISIT (OUTPATIENT)
Dept: FAMILY MEDICINE | Facility: CLINIC | Age: 42
End: 2022-06-09
Payer: COMMERCIAL

## 2022-06-09 VITALS
SYSTOLIC BLOOD PRESSURE: 122 MMHG | DIASTOLIC BLOOD PRESSURE: 82 MMHG | OXYGEN SATURATION: 97 % | HEART RATE: 60 BPM | WEIGHT: 214 LBS | RESPIRATION RATE: 16 BRPM | TEMPERATURE: 98.4 F | BODY MASS INDEX: 29.43 KG/M2

## 2022-06-09 DIAGNOSIS — L84 CORN OR CALLUS: Primary | ICD-10-CM

## 2022-06-09 PROCEDURE — 99213 OFFICE O/P EST LOW 20 MIN: CPT | Performed by: PHYSICIAN ASSISTANT

## 2022-06-09 NOTE — PROGRESS NOTES
Assessment & Plan:        ICD-10-CM    1. Corn or callus  L84 TRIM HYPERKERATOTIC SKIN LESION, ONE         Plan/Clinical Decision Making:    Procedure:  Alcohol prep.   Used 10 blade to trim callus, small brownish substance within callus, removed.   Patient felt much better. Pain and tenderness mostly resolved.       Return if symptoms worsen or fail to improve.     At the end of the encounter, I discussed results, diagnosis, medications. Discussed red flags for immediate return to clinic/ER, as well as indications for follow up if no improvement. Patient understood and agreed to plan. Patient was stable for discharge.        Judith Burkett PA-C on 6/9/2022 at 12:36 PM          Subjective:     HPI:    Lawson is a 42 year old male who presents to clinic today for the following health issues:  Chief Complaint   Patient presents with     Derm Problem     Patient has a spot that he noticed a couple of weeks ago on back of right heel.     HPI    Patient complains of bump on right posterior heel. Noticed a couple weeks ago.   He notices pulsating pain when pressure in that area.  Usually feels it driving and pushing foot down and shoe pushes on area.   No hx of injury or trauma. No change in shoes.   Patient runs 40-50 miles a week.     History obtained from the patient.    Review of Systems  No fever. No rashes.     Patient Active Problem List   Diagnosis     Morbid obesity with BMI of 40.0-44.9, adult (H)     Flat feet, bilateral     Allergic rhinitis     PAC (premature atrial contraction)     Edema     Venous hypertension of lower extremity, bilateral     Valgus deformity of both feet     Snoring     Venous insufficiency of both lower extremities     Sleep apnea     Morbid obesity due to excess calories (H)        No past medical history on file.    Social History     Tobacco Use     Smoking status: Former Smoker     Packs/day: 2.00     Years: 15.00     Pack years: 30.00     Types: Cigarettes, Cigarettes     Start  date: 8/23/1995     Quit date: 8/23/2006     Years since quitting: 15.8     Smokeless tobacco: Never Used   Substance Use Topics     Alcohol use: Not Currently     Alcohol/week: 0.0 - 2.0 standard drinks     Comment: Alcoholic Drinks/day: now and then             Objective:     Vitals:    06/09/22 1223   BP: 122/82   Pulse: 60   Resp: 16   Temp: 98.4  F (36.9  C)   TempSrc: Oral   SpO2: 97%   Weight: 97.1 kg (214 lb)         Physical Exam   EXAM:   Pleasant, alert, appropriate appearance. NAD.  Head Exam: Normocephalic, atraumatic.  Eye Exam:   non icteric/injection.    Ext/musculoskeletal: Good ROM of right foot and ankle.  No edema, DP pulse 2+, normal gait.   Foot- right medial heel with small area with callus, tenderness and brownish discoloration deeper in skin.   Neuro: CN II-XII intact grossly intact.        Results:  No results found for any visits on 06/09/22.

## 2022-11-20 ENCOUNTER — HEALTH MAINTENANCE LETTER (OUTPATIENT)
Age: 42
End: 2022-11-20

## 2023-04-10 ASSESSMENT — ENCOUNTER SYMPTOMS
HEMATURIA: 0
WEAKNESS: 0
SORE THROAT: 0
DYSURIA: 0
PALPITATIONS: 0
CONSTIPATION: 0
DIARRHEA: 0
COUGH: 0
CHILLS: 0
MYALGIAS: 0
HEMATOCHEZIA: 0
SHORTNESS OF BREATH: 0
DIZZINESS: 0
ABDOMINAL PAIN: 0
FEVER: 0
ARTHRALGIAS: 0
NAUSEA: 0
FREQUENCY: 0
PARESTHESIAS: 0
HEARTBURN: 0
EYE PAIN: 0
HEADACHES: 0
JOINT SWELLING: 0
NERVOUS/ANXIOUS: 0

## 2023-04-15 ENCOUNTER — HEALTH MAINTENANCE LETTER (OUTPATIENT)
Age: 43
End: 2023-04-15

## 2023-04-17 ENCOUNTER — OFFICE VISIT (OUTPATIENT)
Dept: FAMILY MEDICINE | Facility: CLINIC | Age: 43
End: 2023-04-17
Payer: COMMERCIAL

## 2023-04-17 VITALS
BODY MASS INDEX: 26.84 KG/M2 | TEMPERATURE: 99 F | RESPIRATION RATE: 14 BRPM | OXYGEN SATURATION: 97 % | HEART RATE: 54 BPM | WEIGHT: 191.7 LBS | HEIGHT: 71 IN | SYSTOLIC BLOOD PRESSURE: 122 MMHG | DIASTOLIC BLOOD PRESSURE: 86 MMHG

## 2023-04-17 DIAGNOSIS — Z13.220 SCREENING FOR LIPOID DISORDERS: ICD-10-CM

## 2023-04-17 DIAGNOSIS — Z98.84 S/P GASTRIC BYPASS: Primary | ICD-10-CM

## 2023-04-17 DIAGNOSIS — Z11.59 NEED FOR HEPATITIS C SCREENING TEST: ICD-10-CM

## 2023-04-17 DIAGNOSIS — Z00.00 ROUTINE GENERAL MEDICAL EXAMINATION AT A HEALTH CARE FACILITY: ICD-10-CM

## 2023-04-17 DIAGNOSIS — Z11.4 SCREENING FOR HIV (HUMAN IMMUNODEFICIENCY VIRUS): ICD-10-CM

## 2023-04-17 LAB
ALBUMIN SERPL BCG-MCNC: 4.6 G/DL (ref 3.5–5.2)
ALP SERPL-CCNC: 74 U/L (ref 40–129)
ALT SERPL W P-5'-P-CCNC: 36 U/L (ref 10–50)
ANION GAP SERPL CALCULATED.3IONS-SCNC: 11 MMOL/L (ref 7–15)
AST SERPL W P-5'-P-CCNC: 36 U/L (ref 10–50)
BILIRUB SERPL-MCNC: 1.1 MG/DL
BUN SERPL-MCNC: 23.7 MG/DL (ref 6–20)
CALCIUM SERPL-MCNC: 9.5 MG/DL (ref 8.6–10)
CHLORIDE SERPL-SCNC: 107 MMOL/L (ref 98–107)
CHOLEST SERPL-MCNC: 128 MG/DL
CREAT SERPL-MCNC: 0.91 MG/DL (ref 0.67–1.17)
DEPRECATED HCO3 PLAS-SCNC: 26 MMOL/L (ref 22–29)
ERYTHROCYTE [DISTWIDTH] IN BLOOD BY AUTOMATED COUNT: 12.9 % (ref 10–15)
FERRITIN SERPL-MCNC: 93 NG/ML (ref 31–409)
GFR SERPL CREATININE-BSD FRML MDRD: >90 ML/MIN/1.73M2
GLUCOSE SERPL-MCNC: 93 MG/DL (ref 70–99)
HCT VFR BLD AUTO: 44.7 % (ref 40–53)
HDLC SERPL-MCNC: 59 MG/DL
HGB BLD-MCNC: 15.1 G/DL (ref 13.3–17.7)
IRON BINDING CAPACITY (ROCHE): 274 UG/DL (ref 240–430)
IRON SATN MFR SERPL: 32 % (ref 15–46)
IRON SERPL-MCNC: 89 UG/DL (ref 61–157)
LDLC SERPL CALC-MCNC: 63 MG/DL
MCH RBC QN AUTO: 32.3 PG (ref 26.5–33)
MCHC RBC AUTO-ENTMCNC: 33.8 G/DL (ref 31.5–36.5)
MCV RBC AUTO: 96 FL (ref 78–100)
NONHDLC SERPL-MCNC: 69 MG/DL
PLATELET # BLD AUTO: 259 10E3/UL (ref 150–450)
POTASSIUM SERPL-SCNC: 4.3 MMOL/L (ref 3.4–5.3)
PROT SERPL-MCNC: 7.1 G/DL (ref 6.4–8.3)
RBC # BLD AUTO: 4.67 10E6/UL (ref 4.4–5.9)
SODIUM SERPL-SCNC: 144 MMOL/L (ref 136–145)
TRIGL SERPL-MCNC: 31 MG/DL
TSH SERPL DL<=0.005 MIU/L-ACNC: 1.66 UIU/ML (ref 0.3–4.2)
VIT B12 SERPL-MCNC: 736 PG/ML (ref 232–1245)
WBC # BLD AUTO: 9.2 10E3/UL (ref 4–11)

## 2023-04-17 PROCEDURE — 87389 HIV-1 AG W/HIV-1&-2 AB AG IA: CPT | Performed by: FAMILY MEDICINE

## 2023-04-17 PROCEDURE — 83540 ASSAY OF IRON: CPT | Performed by: FAMILY MEDICINE

## 2023-04-17 PROCEDURE — 36415 COLL VENOUS BLD VENIPUNCTURE: CPT | Performed by: FAMILY MEDICINE

## 2023-04-17 PROCEDURE — 86803 HEPATITIS C AB TEST: CPT | Performed by: FAMILY MEDICINE

## 2023-04-17 PROCEDURE — 82607 VITAMIN B-12: CPT | Performed by: FAMILY MEDICINE

## 2023-04-17 PROCEDURE — 82306 VITAMIN D 25 HYDROXY: CPT | Performed by: FAMILY MEDICINE

## 2023-04-17 PROCEDURE — 82728 ASSAY OF FERRITIN: CPT | Performed by: FAMILY MEDICINE

## 2023-04-17 PROCEDURE — 80061 LIPID PANEL: CPT | Performed by: FAMILY MEDICINE

## 2023-04-17 PROCEDURE — 80053 COMPREHEN METABOLIC PANEL: CPT | Performed by: FAMILY MEDICINE

## 2023-04-17 PROCEDURE — 99396 PREV VISIT EST AGE 40-64: CPT | Performed by: FAMILY MEDICINE

## 2023-04-17 PROCEDURE — 84443 ASSAY THYROID STIM HORMONE: CPT | Performed by: FAMILY MEDICINE

## 2023-04-17 PROCEDURE — 83550 IRON BINDING TEST: CPT | Performed by: FAMILY MEDICINE

## 2023-04-17 PROCEDURE — 85027 COMPLETE CBC AUTOMATED: CPT | Performed by: FAMILY MEDICINE

## 2023-04-17 ASSESSMENT — ENCOUNTER SYMPTOMS
COUGH: 0
HEMATOCHEZIA: 0
CONSTIPATION: 0
SHORTNESS OF BREATH: 0
HEADACHES: 0
DIARRHEA: 0
PARESTHESIAS: 0
CHILLS: 0
WEAKNESS: 0
ARTHRALGIAS: 0
ABDOMINAL PAIN: 0
NAUSEA: 0
PALPITATIONS: 0
SORE THROAT: 0
HEMATURIA: 0
JOINT SWELLING: 0
HEARTBURN: 0
FREQUENCY: 0
MYALGIAS: 0
FEVER: 0
EYE PAIN: 0
DYSURIA: 0
DIZZINESS: 0
NERVOUS/ANXIOUS: 0

## 2023-04-17 ASSESSMENT — PAIN SCALES - GENERAL: PAINLEVEL: NO PAIN (0)

## 2023-04-17 NOTE — PROGRESS NOTES
SUBJECTIVE:   CC: Lawson is an 43 year old who presents for preventative health visit.       2023     8:15 AM   Additional Questions   Roomed by Cassi Bernard   Patient has been advised of split billing requirements and indicates understanding: Yes  1)  S/p Laparoscopic Sleeve Gastrectomy-  2019    Healthy Habits:     Getting at least 3 servings of Calcium per day:  Yes    Bi-annual eye exam:  Yes    Dental care twice a year:  Yes    Sleep apnea or symptoms of sleep apnea:  None    Diet:  Regular (no restrictions) and Low salt    Frequency of exercise:  6-7 days/week    Duration of exercise:  Greater than 60 minutes    Taking medications regularly:  Yes    Medication side effects:  None    PHQ-2 Total Score: 0    Additional concerns today:  Yes              Today's PHQ-2 Score:       4/10/2023     9:05 AM   PHQ-2 (  [employer])   Q1: Little interest or pleasure in doing things 0   Q2: Feeling down, depressed or hopeless 0   PHQ-2 Score 0   Q1: Little interest or pleasure in doing things Not at all    Not at all   Q2: Feeling down, depressed or hopeless Not at all    Not at all   PHQ-2 Score 0    0       Have you ever done Advance Care Planning? (For example, a Health Directive, POLST, or a discussion with a medical provider or your loved ones about your wishes): No, advance care planning information given to patient to review.  Patient declined advance care planning discussion at this time.    Social History     Tobacco Use     Smoking status: Former     Packs/day: 2.00     Years: 15.00     Pack years: 30.00     Types: Cigarettes     Start date: 1995     Quit date: 2006     Years since quittin.6     Passive exposure: Never     Smokeless tobacco: Former     Types: Chew     Tobacco comments:     No   Vaping Use     Vaping status: Never Used     Passive vaping exposure: Yes   Substance Use Topics     Alcohol use: Not Currently     Comment: Sober since 7/26/21             4/10/2023     9:05 AM  "  Alcohol Use   Prescreen: >3 drinks/day or >7 drinks/week? Not Applicable       Last PSA: No results found for: PSA    Reviewed orders with patient. Reviewed health maintenance and updated orders accordingly - Yes      Reviewed and updated as needed this visit by clinical staff   Tobacco  Allergies  Meds              Reviewed and updated as needed this visit by Provider                     Review of Systems   Constitutional: Negative for chills and fever.   HENT: Negative for congestion, ear pain, hearing loss and sore throat.    Eyes: Negative for pain and visual disturbance.   Respiratory: Negative for cough and shortness of breath.    Cardiovascular: Negative for chest pain, palpitations and peripheral edema.   Gastrointestinal: Negative for abdominal pain, constipation, diarrhea, heartburn, hematochezia and nausea.   Genitourinary: Negative for dysuria, frequency, genital sores, hematuria, impotence, penile discharge and urgency.   Musculoskeletal: Negative for arthralgias, joint swelling and myalgias.   Skin: Negative for rash.   Neurological: Negative for dizziness, weakness, headaches and paresthesias.   Psychiatric/Behavioral: Negative for mood changes. The patient is not nervous/anxious.          OBJECTIVE:   BP (!) 142/80 (BP Location: Left arm, Patient Position: Sitting, Cuff Size: Adult Regular)   Pulse 54   Temp 99  F (37.2  C) (Oral)   Resp 14   Ht 1.791 m (5' 10.5\")   Wt 87 kg (191 lb 11.2 oz)   SpO2 97%   BMI 27.12 kg/m      Physical Exam  GENERAL: healthy, alert and no distress  EYES: Eyes grossly normal to inspection, PERRL and conjunctivae and sclerae normal  HENT: ear canals and TM's normal, nose and mouth without ulcers or lesions  NECK: no adenopathy, no asymmetry, masses, or scars and thyroid normal to palpation  RESP: lungs clear to auscultation - no rales, rhonchi or wheezes  CV: regular rate and rhythm, normal S1 S2, no S3 or S4, no murmur, click or rub, no peripheral edema and " "peripheral pulses strong  ABDOMEN: soft, nontender, no hepatosplenomegaly, no masses and bowel sounds normal  MS: no gross musculoskeletal defects noted, no edema  SKIN: no suspicious lesions or rashes  NEURO: Normal strength and tone, mentation intact and speech normal  PSYCH: mentation appears normal, affect normal/bright    Diagnostic Test Results:  Labs reviewed in Epic    ASSESSMENT/PLAN:       ICD-10-CM    1. S/P gastric bypass  Z98.84 CBC with platelets     Vitamin B12     Iron and iron binding capacity     TSH with free T4 reflex     Comprehensive metabolic panel (BMP + Alb, Alk Phos, ALT, AST, Total. Bili, TP)     Vitamin D Deficiency     Ferritin     CBC with platelets     Vitamin B12     Iron and iron binding capacity     TSH with free T4 reflex     Comprehensive metabolic panel (BMP + Alb, Alk Phos, ALT, AST, Total. Bili, TP)     Vitamin D Deficiency     Ferritin      2. Screening for HIV (human immunodeficiency virus)  Z11.4 HIV Antigen Antibody Combo     HIV Antigen Antibody Combo      3. Need for hepatitis C screening test  Z11.59 Hepatitis C Screen Reflex to HCV RNA Quant and Genotype     Hepatitis C Screen Reflex to HCV RNA Quant and Genotype      4. Screening for lipoid disorders  Z13.220 Lipid panel reflex to direct LDL Non-fasting     Lipid panel reflex to direct LDL Non-fasting      5. Routine general medical examination at a health care facility  Z00.00           Patient has been advised of split billing requirements and indicates understanding: Yes      COUNSELING:   Reviewed preventive health counseling, as reflected in patient instructions       Regular exercise       Healthy diet/nutrition      BMI:   Estimated body mass index is 27.12 kg/m  as calculated from the following:    Height as of this encounter: 1.791 m (5' 10.5\").    Weight as of this encounter: 87 kg (191 lb 11.2 oz).         He reports that he quit smoking about 16 years ago. His smoking use included cigarettes. He started " smoking about 27 years ago. He has a 30.00 pack-year smoking history. He has never been exposed to tobacco smoke. He has quit using smokeless tobacco.  His smokeless tobacco use included chew.            NETTE HUNTER MD  [facility]

## 2023-04-18 LAB
DEPRECATED CALCIDIOL+CALCIFEROL SERPL-MC: 60 UG/L (ref 20–75)
HCV AB SERPL QL IA: NONREACTIVE
HIV 1+2 AB+HIV1 P24 AG SERPL QL IA: NONREACTIVE

## 2023-06-28 ENCOUNTER — OFFICE VISIT (OUTPATIENT)
Dept: FAMILY MEDICINE | Facility: CLINIC | Age: 43
End: 2023-06-28
Payer: COMMERCIAL

## 2023-06-28 VITALS
HEART RATE: 50 BPM | DIASTOLIC BLOOD PRESSURE: 81 MMHG | TEMPERATURE: 99 F | RESPIRATION RATE: 16 BRPM | OXYGEN SATURATION: 97 % | SYSTOLIC BLOOD PRESSURE: 143 MMHG

## 2023-06-28 DIAGNOSIS — H61.22 CERUMINOSIS, LEFT: Primary | ICD-10-CM

## 2023-06-28 DIAGNOSIS — S00.412A ABRASION OF LEFT EAR CANAL, INITIAL ENCOUNTER: ICD-10-CM

## 2023-06-28 PROCEDURE — 99213 OFFICE O/P EST LOW 20 MIN: CPT | Performed by: FAMILY MEDICINE

## 2023-06-28 RX ORDER — NEOMYCIN SULFATE, POLYMYXIN B SULFATE AND HYDROCORTISONE 10; 3.5; 1 MG/ML; MG/ML; [USP'U]/ML
3 SUSPENSION/ DROPS AURICULAR (OTIC) 3 TIMES DAILY
Qty: 10 ML | Refills: 0 | Status: SHIPPED | OUTPATIENT
Start: 2023-06-28 | End: 2023-07-08

## 2023-06-28 NOTE — PROGRESS NOTES
OUTPATIENT VISIT NOTE                                                   Date of Visit: 2023     Chief Complaint   Patient presents with:  Ear Problem: Left ear is plugged            History of Present Illness   Lawson Daniels is a 43 year old male has had trouble with his hearing for the last 5 days.  Used some ear plugs and then the problem.  No ringing in ear.  No balance trouble.       MEDICATIONS   Current Outpatient Medications   Medication     cholecalciferol, vitamin D3, 5,000 unit Tab     cyanocobalamin, vitamin B-12, 1,000 mcg Subl     fexofenadine (ALLEGRA) 180 MG tablet     neomycin-polymyxin-hydrocortisone (CORTISPORIN) 3.5-30276-8 otic suspension     pediatric multivitamin-iron (POLY-VI-SOL WITH IRON) chewable tablet     No current facility-administered medications for this visit.         SOCIAL HISTORY   Social History     Tobacco Use     Smoking status: Former     Packs/day: 2.00     Years: 15.00     Pack years: 30.00     Types: Cigarettes     Start date: 1995     Quit date: 2006     Years since quittin.8     Passive exposure: Never     Smokeless tobacco: Former     Types: Chew     Tobacco comments:     No   Substance Use Topics     Alcohol use: Not Currently     Comment: Sober since 23           Physical Exam   Vitals:    23 1018   BP: (!) 143/81   Pulse: 50   Resp: 16   Temp: 99  F (37.2  C)   TempSrc: Oral   SpO2: 97%        GEN:  NAD  Ears:  Right tm normal.  Left occluded by cerumen     Procedure Note   Procedure:  left ear irrigation by CSS with moderate return of wax    Post irrigation Ear EXAM:  Still significant amount of wax present.  I removed some under direct visualization with lighted ear curette. Still unable to see the TM.  Scraped the ear canal with small amount of bleeding..          Assessment and Plan     Ceruminosis, left  Partial removal of cerumen.  Hearing improved  - Ear wax removal    Abrasion of left ear canal, initial encounter  Cortisporin  for the scrape  - neomycin-polymyxin-hydrocortisone (CORTISPORIN) 3.5-77428-3 otic suspension  Dispense: 10 mL; Refill: 0     Patient Instructions   Ear drops as directed for 5-7 days.  If ear feels fine, stop the drops.  Then may use the debrox for 3-4 days and try irrigating.    If hearing isn't back completely then or more ear symptoms, you will need to be seen.               Discussed signs / symptoms that warrant urgent / emergent medical attention.     Recheck if worsening or not improving.       Donya Velasquez MD          Pertinent History     The following portions of the patient's history were reviewed and updated as appropriate: allergies, current medications, past family history, past medical history, past social history, past surgical history and problem list.

## 2023-06-28 NOTE — PATIENT INSTRUCTIONS
Ear drops as directed for 5-7 days.  If ear feels fine, stop the drops.  Then may use the debrox for 3-4 days and try irrigating.    If hearing isn't back completely then or more ear symptoms, you will need to be seen.

## 2024-02-07 ENCOUNTER — OFFICE VISIT (OUTPATIENT)
Dept: INTERNAL MEDICINE | Facility: CLINIC | Age: 44
End: 2024-02-07
Payer: COMMERCIAL

## 2024-02-07 ENCOUNTER — TELEPHONE (OUTPATIENT)
Dept: FAMILY MEDICINE | Facility: CLINIC | Age: 44
End: 2024-02-07

## 2024-02-07 VITALS
DIASTOLIC BLOOD PRESSURE: 78 MMHG | HEIGHT: 71 IN | WEIGHT: 203.4 LBS | OXYGEN SATURATION: 99 % | RESPIRATION RATE: 12 BRPM | TEMPERATURE: 98.7 F | BODY MASS INDEX: 28.48 KG/M2 | HEART RATE: 57 BPM | SYSTOLIC BLOOD PRESSURE: 128 MMHG

## 2024-02-07 DIAGNOSIS — N52.9 ERECTILE DYSFUNCTION, UNSPECIFIED ERECTILE DYSFUNCTION TYPE: Primary | ICD-10-CM

## 2024-02-07 PROBLEM — E66.01 MORBID OBESITY DUE TO EXCESS CALORIES (H): Status: RESOLVED | Noted: 2019-05-13 | Resolved: 2024-02-07

## 2024-02-07 PROCEDURE — 84403 ASSAY OF TOTAL TESTOSTERONE: CPT | Performed by: INTERNAL MEDICINE

## 2024-02-07 PROCEDURE — 99213 OFFICE O/P EST LOW 20 MIN: CPT | Performed by: INTERNAL MEDICINE

## 2024-02-07 PROCEDURE — 36415 COLL VENOUS BLD VENIPUNCTURE: CPT | Performed by: INTERNAL MEDICINE

## 2024-02-07 RX ORDER — TADALAFIL 20 MG/1
20 TABLET ORAL
Qty: 6 TABLET | Refills: 0 | Status: ON HOLD | OUTPATIENT
Start: 2024-02-07 | End: 2024-02-13

## 2024-02-07 RX ORDER — SILDENAFIL 100 MG/1
100 TABLET, FILM COATED ORAL DAILY PRN
Qty: 6 TABLET | Refills: 0 | Status: ON HOLD | OUTPATIENT
Start: 2024-02-07 | End: 2024-02-13

## 2024-02-07 ASSESSMENT — PAIN SCALES - GENERAL: PAINLEVEL: NO PAIN (0)

## 2024-02-07 NOTE — TELEPHONE ENCOUNTER
Please start prior auth on :     Disp Refills Start End NITIN   tadalafil (ADCIRCA/CIALIS) 20 MG tablet 6 tablet 0 2/7/2024 -- No   Sig - Route: Take 1 tablet (20 mg) by mouth every 48 hours as needed (ed) - Oral   Class: Local Print   Order: [id number]

## 2024-02-07 NOTE — PROGRESS NOTES
1. Erectile dysfunction, unspecified erectile dysfunction type  I discussed with patient he really has some age-related changes that could be vasculogenic and now with new relationship have some performance anxiety issues as well.  It does not sound like he is hypogonadal but we will check his testosterone level.  Try higher dose of Viagra at 100 mg.  Avoid the beet supplement while on this.  I also gave him Cialis to try and I discussed with him that the side effects will last longer as does the medication effects.  He should not take these medicines together.  If that does not work would recommend evaluation with urology.  I do think that some of his issues though today are possibly psychological and performance anxiety at this point.  - Testosterone, total; Future  - sildenafil (VIAGRA) 100 MG tablet; Take 1 tablet (100 mg) by mouth daily as needed (ed)  Dispense: 6 tablet; Refill: 0  - tadalafil (ADCIRCA/CIALIS) 20 MG tablet; Take 1 tablet (20 mg) by mouth every 48 hours as needed (ed)  Dispense: 6 tablet; Refill: 0  - Testosterone, total     Vazquez Pathak is a 43 year old, presenting for the following health issues:  Concerns (Would like to discuss ED. PCP Dr. Vasquez ([place]). )        2/7/2024    12:06 PM   Additional Questions   Roomed by Maria Del Carmen     History of Present Illness       Reason for visit:  Erectile dysfunction    He eats 2-3 servings of fruits and vegetables daily.He consumes 2 sweetened beverage(s) daily.He exercises with enough effort to increase his heart rate 60 or more minutes per day.  He exercises with enough effort to increase his heart rate 5 days per week.   He is taking medications regularly.           Nice patient who goes to the [facility].  He comes in today as an urgent add-on for evaluation of rectal dysfunction.  Is been ongoing for a while and he is obtaining some Viagra through a online pharmacy and provider.  He has been taking 50 mg and it somewhat  "works at times other times it is giving him palpitations or fast heart rate.  This was in combination with some beet powder that he has been taking though.  He is very active and runs many miles a week with no cardiopulmonary symptoms.  He is status post gastric bypass and has lost well over 100 pounds.  He went through a divorce recently and has a new partner.  He can obtain erection at times but it does not sustain.  He can sometimes masturbate with full erection to orgasm.  He is able to wake up with a erection at times.          Objective    /78 (BP Location: Left arm, Patient Position: Sitting, Cuff Size: Adult Regular)   Pulse 57   Temp 98.7  F (37.1  C) (Tympanic)   Resp 12   Ht 1.791 m (5' 10.51\")   Wt 92.3 kg (203 lb 6.4 oz)   SpO2 99%   BMI 28.76 kg/m    Body mass index is 28.76 kg/m .  Physical Exam       Present gentleman who looks well.  Mildly anxious today        Signed Electronically by: BREN WHEELER MD    "

## 2024-02-09 LAB — TESTOST SERPL-MCNC: 764 NG/DL (ref 240–950)

## 2024-02-12 ENCOUNTER — HOSPITAL ENCOUNTER (EMERGENCY)
Facility: CLINIC | Age: 44
End: 2024-02-12
Payer: COMMERCIAL

## 2024-02-12 ENCOUNTER — HOSPITAL ENCOUNTER (OUTPATIENT)
Facility: HOSPITAL | Age: 44
Setting detail: OBSERVATION
Discharge: HOME | End: 2024-02-13
Attending: EMERGENCY MEDICINE | Admitting: INTERNAL MEDICINE
Payer: COMMERCIAL

## 2024-02-12 ENCOUNTER — OFFICE VISIT (OUTPATIENT)
Dept: FAMILY MEDICINE | Facility: CLINIC | Age: 44
End: 2024-02-12
Payer: COMMERCIAL

## 2024-02-12 ENCOUNTER — ANCILLARY PROCEDURE (OUTPATIENT)
Dept: GENERAL RADIOLOGY | Facility: CLINIC | Age: 44
End: 2024-02-12
Attending: PHYSICIAN ASSISTANT
Payer: COMMERCIAL

## 2024-02-12 ENCOUNTER — APPOINTMENT (OUTPATIENT)
Dept: RADIOLOGY | Facility: HOSPITAL | Age: 44
End: 2024-02-12
Attending: EMERGENCY MEDICINE
Payer: COMMERCIAL

## 2024-02-12 VITALS
DIASTOLIC BLOOD PRESSURE: 75 MMHG | RESPIRATION RATE: 16 BRPM | TEMPERATURE: 98.7 F | OXYGEN SATURATION: 98 % | HEART RATE: 58 BPM | SYSTOLIC BLOOD PRESSURE: 135 MMHG

## 2024-02-12 DIAGNOSIS — R79.89 ELEVATED TROPONIN: ICD-10-CM

## 2024-02-12 DIAGNOSIS — R07.9 EXERTIONAL CHEST PAIN: ICD-10-CM

## 2024-02-12 DIAGNOSIS — R07.9 CHEST PAIN, UNSPECIFIED TYPE: Primary | ICD-10-CM

## 2024-02-12 DIAGNOSIS — R07.9 CHEST PAIN, UNSPECIFIED TYPE: ICD-10-CM

## 2024-02-12 PROBLEM — G47.30 SLEEP APNEA: Status: ACTIVE | Noted: 2019-05-01

## 2024-02-12 PROBLEM — M21.42 FLAT FEET, BILATERAL: Status: ACTIVE | Noted: 2024-02-12

## 2024-02-12 PROBLEM — J30.9 ALLERGIC RHINITIS: Chronic | Status: ACTIVE | Noted: 2024-02-12

## 2024-02-12 PROBLEM — M21.41 FLAT FEET, BILATERAL: Status: ACTIVE | Noted: 2024-02-12

## 2024-02-12 LAB
ANION GAP SERPL CALCULATED.3IONS-SCNC: 7 MMOL/L (ref 7–15)
ATRIAL RATE - MUSE: 52 BPM
BASOPHILS # BLD AUTO: 0 10E3/UL (ref 0–0.2)
BASOPHILS # BLD AUTO: 0.1 10E3/UL (ref 0–0.2)
BASOPHILS NFR BLD AUTO: 0 %
BASOPHILS NFR BLD AUTO: 1 %
BUN SERPL-MCNC: 18.9 MG/DL (ref 6–20)
CALCIUM SERPL-MCNC: 9.5 MG/DL (ref 8.6–10)
CHLORIDE SERPL-SCNC: 110 MMOL/L (ref 98–107)
CREAT SERPL-MCNC: 0.93 MG/DL (ref 0.67–1.17)
D DIMER PPP FEU-MCNC: 0.31 UG/ML FEU (ref 0–0.5)
DEPRECATED HCO3 PLAS-SCNC: 29 MMOL/L (ref 22–29)
DIASTOLIC BLOOD PRESSURE - MUSE: NORMAL MMHG
EGFRCR SERPLBLD CKD-EPI 2021: >90 ML/MIN/1.73M2
EOSINOPHIL # BLD AUTO: 0.1 10E3/UL (ref 0–0.7)
EOSINOPHIL # BLD AUTO: 0.1 10E3/UL (ref 0–0.7)
EOSINOPHIL NFR BLD AUTO: 1 %
EOSINOPHIL NFR BLD AUTO: 1 %
ERYTHROCYTE [DISTWIDTH] IN BLOOD BY AUTOMATED COUNT: 12.8 % (ref 10–15)
ERYTHROCYTE [DISTWIDTH] IN BLOOD BY AUTOMATED COUNT: 13 % (ref 10–15)
FLUAV RNA SPEC QL NAA+PROBE: NEGATIVE
FLUBV RNA RESP QL NAA+PROBE: NEGATIVE
GLUCOSE SERPL-MCNC: 128 MG/DL (ref 70–99)
HCT VFR BLD AUTO: 43.8 % (ref 40–53)
HCT VFR BLD AUTO: 44.7 % (ref 40–53)
HGB BLD-MCNC: 15.4 G/DL (ref 13.3–17.7)
HGB BLD-MCNC: 15.5 G/DL (ref 13.3–17.7)
IMM GRANULOCYTES # BLD: 0 10E3/UL
IMM GRANULOCYTES # BLD: 0 10E3/UL
IMM GRANULOCYTES NFR BLD: 0 %
IMM GRANULOCYTES NFR BLD: 0 %
INTERPRETATION ECG - MUSE: NORMAL
LYMPHOCYTES # BLD AUTO: 1.9 10E3/UL (ref 0.8–5.3)
LYMPHOCYTES # BLD AUTO: 2.3 10E3/UL (ref 0.8–5.3)
LYMPHOCYTES NFR BLD AUTO: 21 %
LYMPHOCYTES NFR BLD AUTO: 24 %
MCH RBC QN AUTO: 32 PG (ref 26.5–33)
MCH RBC QN AUTO: 32.8 PG (ref 26.5–33)
MCHC RBC AUTO-ENTMCNC: 34.5 G/DL (ref 31.5–36.5)
MCHC RBC AUTO-ENTMCNC: 35.4 G/DL (ref 31.5–36.5)
MCV RBC AUTO: 93 FL (ref 78–100)
MCV RBC AUTO: 93 FL (ref 78–100)
MONOCYTES # BLD AUTO: 0.5 10E3/UL (ref 0–1.3)
MONOCYTES # BLD AUTO: 0.6 10E3/UL (ref 0–1.3)
MONOCYTES NFR BLD AUTO: 6 %
MONOCYTES NFR BLD AUTO: 7 %
NEUTROPHILS # BLD AUTO: 6.3 10E3/UL (ref 1.6–8.3)
NEUTROPHILS # BLD AUTO: 6.6 10E3/UL (ref 1.6–8.3)
NEUTROPHILS NFR BLD AUTO: 67 %
NEUTROPHILS NFR BLD AUTO: 72 %
NRBC # BLD AUTO: 0 10E3/UL
NRBC BLD AUTO-RTO: 0 /100
P AXIS - MUSE: 61 DEGREES
PLATELET # BLD AUTO: 274 10E3/UL (ref 150–450)
PLATELET # BLD AUTO: 280 10E3/UL (ref 150–450)
POTASSIUM SERPL-SCNC: 5.1 MMOL/L (ref 3.4–5.3)
PR INTERVAL - MUSE: 140 MS
QRS DURATION - MUSE: 104 MS
QT - MUSE: 458 MS
QTC - MUSE: 425 MS
R AXIS - MUSE: 59 DEGREES
RBC # BLD AUTO: 4.72 10E6/UL (ref 4.4–5.9)
RBC # BLD AUTO: 4.81 10E6/UL (ref 4.4–5.9)
RSV RNA SPEC NAA+PROBE: NEGATIVE
SARS-COV-2 RNA RESP QL NAA+PROBE: NEGATIVE
SODIUM SERPL-SCNC: 146 MMOL/L (ref 135–145)
SYSTOLIC BLOOD PRESSURE - MUSE: NORMAL MMHG
T AXIS - MUSE: 51 DEGREES
TROPONIN T SERPL HS-MCNC: 39 NG/L
TROPONIN T SERPL HS-MCNC: 59 NG/L
VENTRICULAR RATE- MUSE: 52 BPM
WBC # BLD AUTO: 9.2 10E3/UL (ref 4–11)
WBC # BLD AUTO: 9.4 10E3/UL (ref 4–11)

## 2024-02-12 PROCEDURE — 84484 ASSAY OF TROPONIN QUANT: CPT | Performed by: PHYSICIAN ASSISTANT

## 2024-02-12 PROCEDURE — 93010 ELECTROCARDIOGRAM REPORT: CPT | Performed by: GENERAL ACUTE CARE HOSPITAL

## 2024-02-12 PROCEDURE — G0378 HOSPITAL OBSERVATION PER HR: HCPCS

## 2024-02-12 PROCEDURE — 99285 EMERGENCY DEPT VISIT HI MDM: CPT | Mod: 25

## 2024-02-12 PROCEDURE — 85379 FIBRIN DEGRADATION QUANT: CPT | Performed by: EMERGENCY MEDICINE

## 2024-02-12 PROCEDURE — 36415 COLL VENOUS BLD VENIPUNCTURE: CPT | Performed by: PHYSICIAN ASSISTANT

## 2024-02-12 PROCEDURE — 87637 SARSCOV2&INF A&B&RSV AMP PRB: CPT | Performed by: EMERGENCY MEDICINE

## 2024-02-12 PROCEDURE — 99214 OFFICE O/P EST MOD 30 MIN: CPT | Mod: 25 | Performed by: PHYSICIAN ASSISTANT

## 2024-02-12 PROCEDURE — 80048 BASIC METABOLIC PNL TOTAL CA: CPT | Performed by: PHYSICIAN ASSISTANT

## 2024-02-12 PROCEDURE — 80048 BASIC METABOLIC PNL TOTAL CA: CPT | Performed by: STUDENT IN AN ORGANIZED HEALTH CARE EDUCATION/TRAINING PROGRAM

## 2024-02-12 PROCEDURE — 93005 ELECTROCARDIOGRAM TRACING: CPT | Performed by: STUDENT IN AN ORGANIZED HEALTH CARE EDUCATION/TRAINING PROGRAM

## 2024-02-12 PROCEDURE — 36415 COLL VENOUS BLD VENIPUNCTURE: CPT | Performed by: STUDENT IN AN ORGANIZED HEALTH CARE EDUCATION/TRAINING PROGRAM

## 2024-02-12 PROCEDURE — 99222 1ST HOSP IP/OBS MODERATE 55: CPT | Performed by: INTERNAL MEDICINE

## 2024-02-12 PROCEDURE — 85025 COMPLETE CBC W/AUTO DIFF WBC: CPT | Performed by: PHYSICIAN ASSISTANT

## 2024-02-12 PROCEDURE — 93005 ELECTROCARDIOGRAM TRACING: CPT | Performed by: PHYSICIAN ASSISTANT

## 2024-02-12 PROCEDURE — 71046 X-RAY EXAM CHEST 2 VIEWS: CPT | Mod: TC | Performed by: RADIOLOGY

## 2024-02-12 PROCEDURE — 84484 ASSAY OF TROPONIN QUANT: CPT | Performed by: STUDENT IN AN ORGANIZED HEALTH CARE EDUCATION/TRAINING PROGRAM

## 2024-02-12 PROCEDURE — 71046 X-RAY EXAM CHEST 2 VIEWS: CPT

## 2024-02-12 PROCEDURE — 250N000013 HC RX MED GY IP 250 OP 250 PS 637: Performed by: EMERGENCY MEDICINE

## 2024-02-12 PROCEDURE — 85025 COMPLETE CBC W/AUTO DIFF WBC: CPT | Performed by: STUDENT IN AN ORGANIZED HEALTH CARE EDUCATION/TRAINING PROGRAM

## 2024-02-12 RX ORDER — NITROGLYCERIN 0.4 MG/1
0.4 TABLET SUBLINGUAL EVERY 5 MIN PRN
Status: DISCONTINUED | OUTPATIENT
Start: 2024-02-12 | End: 2024-02-13 | Stop reason: HOSPADM

## 2024-02-12 RX ORDER — ONDANSETRON 2 MG/ML
4 INJECTION INTRAMUSCULAR; INTRAVENOUS EVERY 6 HOURS PRN
Status: DISCONTINUED | OUTPATIENT
Start: 2024-02-12 | End: 2024-02-13 | Stop reason: HOSPADM

## 2024-02-12 RX ORDER — ONDANSETRON 4 MG/1
4 TABLET, ORALLY DISINTEGRATING ORAL EVERY 6 HOURS PRN
Status: DISCONTINUED | OUTPATIENT
Start: 2024-02-12 | End: 2024-02-13 | Stop reason: HOSPADM

## 2024-02-12 RX ORDER — AMOXICILLIN 250 MG
1 CAPSULE ORAL 2 TIMES DAILY PRN
Status: DISCONTINUED | OUTPATIENT
Start: 2024-02-12 | End: 2024-02-13 | Stop reason: HOSPADM

## 2024-02-12 RX ORDER — AMOXICILLIN 250 MG
2 CAPSULE ORAL 2 TIMES DAILY PRN
Status: DISCONTINUED | OUTPATIENT
Start: 2024-02-12 | End: 2024-02-13 | Stop reason: HOSPADM

## 2024-02-12 RX ORDER — ACETAMINOPHEN 325 MG/1
650 TABLET ORAL EVERY 4 HOURS PRN
Status: DISCONTINUED | OUTPATIENT
Start: 2024-02-12 | End: 2024-02-13 | Stop reason: HOSPADM

## 2024-02-12 RX ORDER — ASPIRIN 325 MG
325 TABLET ORAL ONCE
Status: COMPLETED | OUTPATIENT
Start: 2024-02-12 | End: 2024-02-12

## 2024-02-12 RX ADMIN — ASPIRIN 325 MG ORAL TABLET 325 MG: 325 PILL ORAL at 18:47

## 2024-02-12 ASSESSMENT — ACTIVITIES OF DAILY LIVING (ADL)
ADLS_ACUITY_SCORE: 29
ADLS_ACUITY_SCORE: 35
ADLS_ACUITY_SCORE: 35

## 2024-02-12 NOTE — PROGRESS NOTES
Chief Complaint   Patient presents with    Pain     Chest pains on and off since Thursday exercise induced, higher heart rate        ASSESSMENT/PLAN:  Lawson was seen today for pain.    Diagnoses and all orders for this visit:    Chest pain, unspecified type  -     EKG 12-lead, tracing only  -     XR Chest 2 Views; Future  -     CBC with platelets and differential; Future  -     Basic metabolic panel  (Ca, Cl, CO2, Creat, Gluc, K, Na, BUN); Future  -     Troponin T, High Sensitivity; Future  -     CBC with platelets and differential  -     Basic metabolic panel  (Ca, Cl, CO2, Creat, Gluc, K, Na, BUN)  -     Troponin T, High Sensitivity    Elevated troponin    Differential diagnosis includes ACS, MI, NSTEMI, unstable angina, pneumothorax, pneumomediastinum, PE, bronchospasm, heart failure, other cardiac causes among others.    EKG was normal sinus rhythm without any signs of ischemia, ST elevations or depressions.  He is currently PERC negative.     Troponin was elevated at 39.  Called patient with results and advised him to be seen immediately at [facility] ER.  He agreed to go.  Called [facility] and gave patient handoff    Artie Torres PA-C      SUBJECTIVE:  Lawson is a 44 year old male with past medical history of venous hypertension of the lower extremities, PACs, venous insufficiency, status post gastric bypass who presents to urgent care with chest pain over the last 5 days.  It started 12/7 while he was running.  He typically puts 30 to 40 miles of running and each week and has no issues.  He started having a pressure chest pain in the center part of his chest about 2 miles into his 5 mile run.  He is able to walk the last 3 miles home.  The next day he had similar symptoms but less intense and was able to run at a slower pace..  The day after that was also a little bit better but still felt his symptoms while running.  He also has noticed that his heart is beating faster than typical and more short of  breath.  While at rest she has been fine.  He took yesterday off from running and then today started experiencing symptoms similar to the first day where the pain was more intense.  He also noticed some tingling in his arms during this.  No recent travel    has distant risk factors of previous smoking and obesity.  Minimal family history risk factors.    Does not have history of hypertension or high cholesterol, quit smoking 20 years ago with a 30-pack-year history.    9/20/2016 [facility] Cardiac MRI  Cardiac MRI with and without Contrast  MPRESSIONS:    1.  Normal left ventricular size and systolic function. The quantified left ventricular ejection fraction is 70.2%. No myocardial scar is identified.  Normal indexed myocardial mass.   2.  Normal right ventricular size and function   3.  Mild left atrial enlargement (32 ml/m2)  4.  No significant valvular abnormalites    ROS: Pertinent ROS neg other than the symptoms noted above in the HPI.     OBJECTIVE:  /75   Pulse 58   Temp 98.7  F (37.1  C) (Oral)   Resp 16   SpO2 98%    GENERAL: alert and no distress  EYES: Eyes grossly normal to inspection, PERRL and conjunctivae and sclerae normal  HENT: ear canals and TM's normal, nose and mouth without ulcers or lesions  RESP: lungs clear to auscultation - no rales, rhonchi or wheezes  CV: regular rate and rhythm, normal S1 S2, no S3 or S4, no murmur, click or rub, no peripheral edema   MS: no gross musculoskeletal defects noted, no edema, no calf tenderness  SKIN: no suspicious lesions or rashes  NEURO: Normal strength and tone, mentation intact and speech normal    DIAGNOSTICS  Xray - Reviewed and interpreted by me.  No acute cardiopulmonary malady noted  EKG - Reviewed and interpreted by me appears normal, NSR, normal axis, normal intervals, no acute ST/T changes c/w ischemia  Results for orders placed or performed in visit on 02/12/24   XR Chest 2 Views     Status: None    Narrative    EXAM: XR  CHEST 2 VIEWS  LOCATION: [facility]  DATE: 2/12/2024    INDICATION:  Chest pain, unspecified type  COMPARISON: 05/01/2019      Impression    IMPRESSION: Negative chest.   Results for orders placed or performed in visit on 02/12/24   CBC with platelets and differential     Status: None   Result Value Ref Range    WBC Count 9.2 4.0 - 11.0 10e3/uL    RBC Count 4.81 4.40 - 5.90 10e6/uL    Hemoglobin 15.4 13.3 - 17.7 g/dL    Hematocrit 44.7 40.0 - 53.0 %    MCV 93 78 - 100 fL    MCH 32.0 26.5 - 33.0 pg    MCHC 34.5 31.5 - 36.5 g/dL    RDW 12.8 10.0 - 15.0 %    Platelet Count 274 150 - 450 10e3/uL    % Neutrophils 72 %    % Lymphocytes 21 %    % Monocytes 6 %    % Eosinophils 1 %    % Basophils 0 %    % Immature Granulocytes 0 %    Absolute Neutrophils 6.6 1.6 - 8.3 10e3/uL    Absolute Lymphocytes 1.9 0.8 - 5.3 10e3/uL    Absolute Monocytes 0.5 0.0 - 1.3 10e3/uL    Absolute Eosinophils 0.1 0.0 - 0.7 10e3/uL    Absolute Basophils 0.0 0.0 - 0.2 10e3/uL    Absolute Immature Granulocytes 0.0 <=0.4 10e3/uL   EKG 12-lead, tracing only     Status: None (Preliminary result)   Result Value Ref Range    Systolic Blood Pressure  mmHg    Diastolic Blood Pressure  mmHg    Ventricular Rate 52 BPM    Atrial Rate 52 BPM    ND Interval 140 ms    QRS Duration 104 ms     ms    QTc 425 ms    P Axis 61 degrees    R AXIS 59 degrees    T Axis 51 degrees    Interpretation ECG       Sinus bradycardia  Otherwise normal ECG  When compared with ECG of 01-MAY-2019 17:21,  Premature atrial complexes are no longer Present     CBC with platelets and differential     Status: None    Narrative    The following orders were created for panel order CBC with platelets and differential.  Procedure                               Abnormality         Status                     ---------                               -----------         ------                     CBC with platelets and d...[[id number]]                      Final result                  Please view results for these tests on the individual orders.        Current Outpatient Medications   Medication    cholecalciferol, vitamin D3, 5,000 unit Tab    fexofenadine (ALLEGRA) 180 MG tablet    pediatric multivitamin-iron (POLY-VI-SOL WITH IRON) chewable tablet    sildenafil (VIAGRA) 100 MG tablet    tadalafil (ADCIRCA/CIALIS) 20 MG tablet     No current facility-administered medications for this visit.      Patient Active Problem List   Diagnosis    Flat feet, bilateral    Allergic rhinitis    PAC (premature atrial contraction)    Edema    Venous hypertension of lower extremity, bilateral    Valgus deformity of both feet    Snoring    Venous insufficiency of both lower extremities    Sleep apnea      No past medical history on file.  Past Surgical History:   Procedure Laterality Date    ABDOMEN SURGERY  2019    Bariatric (Gastric Sleeve) Surgery    CHOLECYSTECTOMY  2006    stones.    LAPAROSCOPIC CHOLECYSTECTOMY  2006    [place], [place]- [facility]    LASIK Bilateral 2015    NH LAP, KRISTEN RESTRICT PROC, LONGITUDINAL GASTRECTOMY N/A 2019    Procedure: LAPAROSCOPIC SLEEVE GASTRECTOMY;  Surgeon: Marcos Hernández MD;  Location: [facility] OR;  Service: General    VASECTOMY  2010    Dr Pickering at [facility]     Family History   Problem Relation Age of Onset    Breast Cancer Mother     Myasthenia gravis Father     Snoring Father     Dementia Father     Breast Cancer Maternal Grandmother     ALS Paternal Grandmother     Asthma Daughter     No Known Problems Son      Social History     Tobacco Use    Smoking status: Former     Packs/day: 2.00     Years: 15.00     Additional pack years: 0.00     Total pack years: 30.00     Types: Cigarettes     Start date: 1995     Quit date: 2006     Years since quittin.4     Passive exposure: Never    Smokeless tobacco: Former     Types: Chew    Tobacco comments:     No   Substance Use Topics     Alcohol use: Not Currently     Comment: Sober since 7/26/21              The plan of care was discussed with the patient. They understand and agree with the course of treatment prescribed. A printed summary was given including instructions and medications.  The use of Dragon[employer] dictation services may have been used to construct the content in this note; any grammatical or spelling errors are non-intentional. Please contact the author of this note directly if you are in need of any clarification.

## 2024-02-12 NOTE — ED NOTES
Expected Patient Referral to ED  5:10 PM    Referring Clinic/Provider:  Walk in clinic    Reason for referral/Clinical facts:  Chest pain while exercising over the past few days, troponin elevated at 39 at walk in clinic, no reported EKG changes,     Recommendations provided:  Send to ED for further evaluation    Caller was informed that this institution does possess the capabilities and/or resources to provide for patient and should be transferred to our facility.    Discussed that if direct admit is sought and any hurdles are encountered, this ED would be happy to see the patient and evaluate.    Informed caller that recommendations provided are recommendations based only on the facts provided and that they responsible to accept or reject the advice, or to seek a formal in person consultation as needed and that this ED will see/treat patient should they arrive.      Fito Malhotra MD  [facility] EMERGENCY DEPARTMENT  [address] [place] [id number]  [phone]       Fito Malhotra MD  02/12/24 [id number]

## 2024-02-12 NOTE — ED TRIAGE NOTES
Chest tightness since 2/7/24. Pt runs 30-40 miles a week. Today went for a run, developed chest tightness that has not gone away.

## 2024-02-12 NOTE — Clinical Note
Potential access sites were evaluated for patency using ultrasound.   The right radial vein was selected. Access was obtained under with Sonosite guidance using a micropuncture 21 gauge needle with direct visualization of needle entry.

## 2024-02-12 NOTE — ED NOTES
Expected Patient Referral to ED  5:07 PM    Referring Clinic/Provider:  Walk-in Clinic    Reason for referral/Clinical facts:  Exercise-induced chest pain for a couple of days, elevated troponin.    Recommendations provided:  If elevated troponin and concern for ACS, recommend transfer to hospital with cardiac Cath Lab    Caller was informed that this institution does not possess the capabilities and/or resources to provide for patient and should be transferred to a different facility due to lack of cath lab .    Informed caller that recommendations provided are recommendations based only on the facts provided and that they responsible to accept or reject the advice, or to seek a formal in person consultation as needed and that this ED will see/treat patient should they arrive.      Gabe Greenfield MD  [facility] EMERGENCY ROOM  [facility] [facility] [address]  [phone]     Gabe Greenfield MD  02/12/24 [id number]

## 2024-02-13 ENCOUNTER — APPOINTMENT (OUTPATIENT)
Dept: CARDIOLOGY | Facility: HOSPITAL | Age: 44
End: 2024-02-13
Attending: INTERNAL MEDICINE
Payer: COMMERCIAL

## 2024-02-13 VITALS
BODY MASS INDEX: 26.46 KG/M2 | DIASTOLIC BLOOD PRESSURE: 83 MMHG | TEMPERATURE: 97.9 F | WEIGHT: 189 LBS | RESPIRATION RATE: 16 BRPM | HEART RATE: 52 BPM | HEIGHT: 71 IN | OXYGEN SATURATION: 99 % | SYSTOLIC BLOOD PRESSURE: 140 MMHG

## 2024-02-13 LAB
ABO/RH(D): NORMAL
ANION GAP SERPL CALCULATED.3IONS-SCNC: 9 MMOL/L (ref 7–15)
ANTIBODY SCREEN: NEGATIVE
ATRIAL RATE - MUSE: 51 BPM
BUN SERPL-MCNC: 18.4 MG/DL (ref 6–20)
CALCIUM SERPL-MCNC: 9.2 MG/DL (ref 8.6–10)
CHLORIDE SERPL-SCNC: 106 MMOL/L (ref 98–107)
CHOLEST SERPL-MCNC: 117 MG/DL
CREAT SERPL-MCNC: 0.87 MG/DL (ref 0.67–1.17)
DEPRECATED HCO3 PLAS-SCNC: 27 MMOL/L (ref 22–29)
DIASTOLIC BLOOD PRESSURE - MUSE: NORMAL MMHG
EGFRCR SERPLBLD CKD-EPI 2021: >90 ML/MIN/1.73M2
GLUCOSE SERPL-MCNC: 184 MG/DL (ref 70–99)
HDLC SERPL-MCNC: 49 MG/DL
HOLD SPECIMEN: NORMAL
INTERPRETATION ECG - MUSE: NORMAL
LDLC SERPL CALC-MCNC: 58 MG/DL
LVEF ECHO: NORMAL
NONHDLC SERPL-MCNC: 68 MG/DL
P AXIS - MUSE: 22 DEGREES
POTASSIUM SERPL-SCNC: 4.9 MMOL/L (ref 3.4–5.3)
PR INTERVAL - MUSE: 152 MS
QRS DURATION - MUSE: 102 MS
QT - MUSE: 450 MS
QTC - MUSE: 414 MS
R AXIS - MUSE: 48 DEGREES
SODIUM SERPL-SCNC: 142 MMOL/L (ref 135–145)
SPECIMEN EXPIRATION DATE: NORMAL
SYSTOLIC BLOOD PRESSURE - MUSE: NORMAL MMHG
T AXIS - MUSE: 51 DEGREES
TRIGL SERPL-MCNC: 49 MG/DL
TROPONIN T SERPL HS-MCNC: 44 NG/L
TROPONIN T SERPL HS-MCNC: 69 NG/L
VENTRICULAR RATE- MUSE: 51 BPM

## 2024-02-13 PROCEDURE — 99254 IP/OBS CNSLTJ NEW/EST MOD 60: CPT | Performed by: INTERNAL MEDICINE

## 2024-02-13 PROCEDURE — 93306 TTE W/DOPPLER COMPLETE: CPT

## 2024-02-13 PROCEDURE — 99207 PR APP CREDIT; MD BILLING SHARED VISIT: CPT | Performed by: FAMILY MEDICINE

## 2024-02-13 PROCEDURE — 93005 ELECTROCARDIOGRAM TRACING: CPT

## 2024-02-13 PROCEDURE — C1887 CATHETER, GUIDING: HCPCS | Performed by: INTERNAL MEDICINE

## 2024-02-13 PROCEDURE — C1894 INTRO/SHEATH, NON-LASER: HCPCS | Performed by: INTERNAL MEDICINE

## 2024-02-13 PROCEDURE — G0378 HOSPITAL OBSERVATION PER HR: HCPCS

## 2024-02-13 PROCEDURE — 82465 ASSAY BLD/SERUM CHOLESTEROL: CPT | Performed by: INTERNAL MEDICINE

## 2024-02-13 PROCEDURE — 272N000001 HC OR GENERAL SUPPLY STERILE: Performed by: INTERNAL MEDICINE

## 2024-02-13 PROCEDURE — 84484 ASSAY OF TROPONIN QUANT: CPT | Performed by: INTERNAL MEDICINE

## 2024-02-13 PROCEDURE — 250N000013 HC RX MED GY IP 250 OP 250 PS 637: Performed by: INTERNAL MEDICINE

## 2024-02-13 PROCEDURE — 93306 TTE W/DOPPLER COMPLETE: CPT | Mod: 26 | Performed by: INTERNAL MEDICINE

## 2024-02-13 PROCEDURE — 258N000003 HC RX IP 258 OP 636: Performed by: INTERNAL MEDICINE

## 2024-02-13 PROCEDURE — 93458 L HRT ARTERY/VENTRICLE ANGIO: CPT | Mod: 26 | Performed by: INTERNAL MEDICINE

## 2024-02-13 PROCEDURE — 99207 PR APP CREDIT; MD BILLING SHARED VISIT: CPT

## 2024-02-13 PROCEDURE — 93010 ELECTROCARDIOGRAM REPORT: CPT | Mod: RTG | Performed by: INTERNAL MEDICINE

## 2024-02-13 PROCEDURE — 250N000009 HC RX 250: Performed by: INTERNAL MEDICINE

## 2024-02-13 PROCEDURE — 86900 BLOOD TYPING SEROLOGIC ABO: CPT | Performed by: INTERNAL MEDICINE

## 2024-02-13 PROCEDURE — 250N000011 HC RX IP 250 OP 636: Performed by: INTERNAL MEDICINE

## 2024-02-13 PROCEDURE — 99152 MOD SED SAME PHYS/QHP 5/>YRS: CPT | Performed by: INTERNAL MEDICINE

## 2024-02-13 PROCEDURE — 255N000002 HC RX 255 OP 636: Performed by: INTERNAL MEDICINE

## 2024-02-13 PROCEDURE — 36415 COLL VENOUS BLD VENIPUNCTURE: CPT | Performed by: INTERNAL MEDICINE

## 2024-02-13 PROCEDURE — 93458 L HRT ARTERY/VENTRICLE ANGIO: CPT | Performed by: INTERNAL MEDICINE

## 2024-02-13 PROCEDURE — 84484 ASSAY OF TROPONIN QUANT: CPT | Mod: 91 | Performed by: INTERNAL MEDICINE

## 2024-02-13 RX ORDER — NALOXONE HYDROCHLORIDE 0.4 MG/ML
0.4 INJECTION, SOLUTION INTRAMUSCULAR; INTRAVENOUS; SUBCUTANEOUS
Status: DISCONTINUED | OUTPATIENT
Start: 2024-02-13 | End: 2024-02-13 | Stop reason: HOSPADM

## 2024-02-13 RX ORDER — FENTANYL CITRATE 50 UG/ML
25 INJECTION, SOLUTION INTRAMUSCULAR; INTRAVENOUS
Status: DISCONTINUED | OUTPATIENT
Start: 2024-02-13 | End: 2024-02-13 | Stop reason: HOSPADM

## 2024-02-13 RX ORDER — SODIUM CHLORIDE 9 MG/ML
75 INJECTION, SOLUTION INTRAVENOUS CONTINUOUS
Status: DISCONTINUED | OUTPATIENT
Start: 2024-02-13 | End: 2024-02-13 | Stop reason: HOSPADM

## 2024-02-13 RX ORDER — OXYCODONE HYDROCHLORIDE 5 MG/1
10 TABLET ORAL EVERY 4 HOURS PRN
Status: DISCONTINUED | OUTPATIENT
Start: 2024-02-13 | End: 2024-02-13 | Stop reason: HOSPADM

## 2024-02-13 RX ORDER — LOSARTAN POTASSIUM 25 MG/1
25 TABLET ORAL DAILY
Qty: 30 TABLET | Refills: 0 | Status: SHIPPED | OUTPATIENT
Start: 2024-02-14 | End: 2024-03-18

## 2024-02-13 RX ORDER — ATROPINE SULFATE 0.1 MG/ML
0.5 INJECTION INTRAVENOUS
Status: DISCONTINUED | OUTPATIENT
Start: 2024-02-13 | End: 2024-02-13 | Stop reason: HOSPADM

## 2024-02-13 RX ORDER — LOSARTAN POTASSIUM 25 MG/1
25 TABLET ORAL DAILY
Status: DISCONTINUED | OUTPATIENT
Start: 2024-02-13 | End: 2024-02-13 | Stop reason: HOSPADM

## 2024-02-13 RX ORDER — NALOXONE HYDROCHLORIDE 0.4 MG/ML
0.2 INJECTION, SOLUTION INTRAMUSCULAR; INTRAVENOUS; SUBCUTANEOUS
Status: DISCONTINUED | OUTPATIENT
Start: 2024-02-13 | End: 2024-02-13 | Stop reason: HOSPADM

## 2024-02-13 RX ORDER — FLUMAZENIL 0.1 MG/ML
0.2 INJECTION, SOLUTION INTRAVENOUS
Status: DISCONTINUED | OUTPATIENT
Start: 2024-02-13 | End: 2024-02-13 | Stop reason: HOSPADM

## 2024-02-13 RX ORDER — ASPIRIN 81 MG/1
243 TABLET, CHEWABLE ORAL ONCE
Status: COMPLETED | OUTPATIENT
Start: 2024-02-13 | End: 2024-02-13

## 2024-02-13 RX ORDER — IODIXANOL 320 MG/ML
INJECTION, SOLUTION INTRAVASCULAR
Status: DISCONTINUED | OUTPATIENT
Start: 2024-02-13 | End: 2024-02-13 | Stop reason: HOSPADM

## 2024-02-13 RX ORDER — ACETAMINOPHEN 325 MG/1
650 TABLET ORAL EVERY 4 HOURS PRN
Status: DISCONTINUED | OUTPATIENT
Start: 2024-02-13 | End: 2024-02-13 | Stop reason: HOSPADM

## 2024-02-13 RX ORDER — ASPIRIN 81 MG/1
81 TABLET ORAL DAILY
Status: DISCONTINUED | OUTPATIENT
Start: 2024-02-14 | End: 2024-02-13 | Stop reason: HOSPADM

## 2024-02-13 RX ORDER — ASPIRIN 81 MG/1
81 TABLET ORAL DAILY
Qty: 30 TABLET | Refills: 0 | Status: SHIPPED | OUTPATIENT
Start: 2024-02-13 | End: 2024-03-14

## 2024-02-13 RX ORDER — DIAZEPAM 5 MG
10 TABLET ORAL
Status: COMPLETED | OUTPATIENT
Start: 2024-02-13 | End: 2024-02-13

## 2024-02-13 RX ORDER — OXYCODONE HYDROCHLORIDE 5 MG/1
5 TABLET ORAL EVERY 4 HOURS PRN
Status: DISCONTINUED | OUTPATIENT
Start: 2024-02-13 | End: 2024-02-13 | Stop reason: HOSPADM

## 2024-02-13 RX ORDER — FENTANYL CITRATE 50 UG/ML
INJECTION, SOLUTION INTRAMUSCULAR; INTRAVENOUS
Status: DISCONTINUED | OUTPATIENT
Start: 2024-02-13 | End: 2024-02-13 | Stop reason: HOSPADM

## 2024-02-13 RX ORDER — FENTANYL CITRATE 50 UG/ML
25 INJECTION, SOLUTION INTRAMUSCULAR; INTRAVENOUS
Status: DISCONTINUED | OUTPATIENT
Start: 2024-02-13 | End: 2024-02-13

## 2024-02-13 RX ORDER — LIDOCAINE 40 MG/G
CREAM TOPICAL
Status: DISCONTINUED | OUTPATIENT
Start: 2024-02-13 | End: 2024-02-13 | Stop reason: HOSPADM

## 2024-02-13 RX ORDER — HYDRALAZINE HYDROCHLORIDE 20 MG/ML
10 INJECTION INTRAMUSCULAR; INTRAVENOUS
Status: DISCONTINUED | OUTPATIENT
Start: 2024-02-13 | End: 2024-02-13 | Stop reason: HOSPADM

## 2024-02-13 RX ORDER — SODIUM CHLORIDE 9 MG/ML
INJECTION, SOLUTION INTRAVENOUS CONTINUOUS
Status: DISCONTINUED | OUTPATIENT
Start: 2024-02-13 | End: 2024-02-13 | Stop reason: HOSPADM

## 2024-02-13 RX ORDER — ASPIRIN 325 MG
325 TABLET ORAL ONCE
Status: COMPLETED | OUTPATIENT
Start: 2024-02-13 | End: 2024-02-13

## 2024-02-13 RX ADMIN — ACETAMINOPHEN 650 MG: 325 TABLET ORAL at 12:50

## 2024-02-13 RX ADMIN — SODIUM CHLORIDE: 9 INJECTION, SOLUTION INTRAVENOUS at 13:53

## 2024-02-13 RX ADMIN — LOSARTAN POTASSIUM 25 MG: 25 TABLET, FILM COATED ORAL at 10:34

## 2024-02-13 RX ADMIN — DIAZEPAM 10 MG: 10 TABLET ORAL at 13:54

## 2024-02-13 RX ADMIN — ASPIRIN 325 MG ORAL TABLET 325 MG: 325 PILL ORAL at 13:54

## 2024-02-13 ASSESSMENT — EJECTION FRACTION: EF_VALUE: .2

## 2024-02-13 ASSESSMENT — ACTIVITIES OF DAILY LIVING (ADL)
ADLS_ACUITY_SCORE: 29
ADLS_ACUITY_SCORE: 30
ADLS_ACUITY_SCORE: 29
ADLS_ACUITY_SCORE: 30
ADLS_ACUITY_SCORE: 30
ADLS_ACUITY_SCORE: 29
ADLS_ACUITY_SCORE: 30

## 2024-02-13 NOTE — ED PROVIDER NOTES
EMERGENCY DEPARTMENT ENCOUNTER      NAME: Lawson Daniels  AGE: 44 year old male  YOB: 1980  MRN: [id number]  EVALUATION DATE & TIME: 2/12/2024  5:55 PM    PCP: Casey Vasquez    ED PROVIDER: Alec Weaver M.D.      Chief Complaint   Patient presents with    Chest Pain         FINAL IMPRESSION:  Exertional chest pain  Elevated troponin      ED COURSE & MEDICAL DECISION MAKING:    Pertinent Labs & Imaging studies reviewed. (See chart for details)  44 year old male presents to the Emergency Department for evaluation of substernal chest pain with exertion.  Patient typically runs 30 to 40 miles weekly.  Over the last week he has began developing severe chest pain with running which then is relieved with rest.  Had initial episode Wednesday of last week had another episode today.  Was seen in urgent care and troponin was slightly elevated and was referred here for further evaluation.  EKG here reveals sinus bradycardia without obvious abnormalities.  Given symptomatology will repeat additional laboratory evaluation.. Patient appears non toxic with stable vitals signs. Overall exam is benign.  Patient reports no hypertension, diabetes.  Was markedly obese weighing more than 350 pounds at 1 time but then lost significant weight due to lifestyle changes.        6:18 PM  I met with the patient for the initial interview and physical examination. Discussed plan for treatment and workup in the ED.    6:35 PM.  Troponin returns elevated at 59 compared to 39 at 3:17 PM today.  Call placed to cardiology.  6:40 PM.  Patient discussed with cardiology.  They are agreeable with plan for hospitalization and evaluation in the morning.  No indications for heparinization at this point given he is pain-free.  Will proceed with 325 mg of aspirin.  Remainder of laboratory evaluation is unremarkable.  D-dimer normal at 0.31.  No indications of pulmonary embolism.  Swab for influenza/COVID/RSV also negative.  CBC unremarkable.   Basic metabolic panel essentially normal other than slight hyperglycemia.  Reviewed chest x-ray is unremarkable.  No evidence of infiltrative disease, effusions, pneumothorax.  Plan for hospitalization will be discussed with patient.  Call placed to the admitting physician.  6:43 PM I rechecked and updated the patient on the plan.  7:02 PM.  Patient discussed with the hospitalist Dr. Bond who is agreeable with plan.  At the conclusion of the encounter I discussed the results of all of the tests and the disposition. The questions were answered and return precautions provided. The patient or family acknowledged understanding and was agreeable with the care plan.       PPE: Provider wore mask.    MEDICATIONS GIVEN IN THE EMERGENCY:  Medications - No data to display    NEW PRESCRIPTIONS STARTED AT TODAY'S ER VISIT  New Prescriptions    No medications on file          =================================================================    HPI    Patient information was obtained from: patient     Use of Intrepreter: N/A         Lawson Daniels is a 44 year old male with a pertient medical history of obesity who presents to the ED for evaluation of chest pain.    The patient presents via walk-in from walk in clinic where his troponin was elevated at 39. He first had the pain on 2/7/2024. He was on a 10k run and when he had 3 miles left he developed left chest pain that lasted for ~45 minutes. He walked the rest of the way home and it eventually resolved. On 2/8 he did not exercise at all because he wanted to take a break. On 2/9 he had a normal run. On 2/10 he went on another run at a slower pace of 12 minutes/mile (normal pace is 9 min/mile). During the run on 2/10 he had slight chest tightness that resolved. Today, he was starting to go on a 5k run and the chest tightness began soon after he began so he walked the rest of the way home but the chest tightness has not resolved. He runs 30-40 miles per week. The patient notes  that he weighed 360 pounds in 2016 when he had a cardiac MRI.     He denies shortness of breath, recent URI symptoms (COVID in 9/2023), abdominal pain, or any other complaints at this time.       Chart Review:  2/12/2024: The patient was seen earlier today at [facility] for this chest pain. He had a negative chest x-ray but an elevated Troponin of 39. EKG showed sinus bradycardia,normal axis, normal intervals, no acute ST/T changes c/w ischemia.      REVIEW OF SYSTEMS   Constitutional:  Denies fever, chills  Respiratory:  Denies productive cough or increased work of breathing  Cardiovascular:  Denies palpitations. Positive for chest pain and chest tightness.  GI:  Denies abdominal pain, nausea, vomiting, or change in bowel or bladder habits   Musculoskeletal:  Denies any new muscle/joint swelling  Skin:  Denies rash   Neurologic:  Denies focal weakness  All systems negative except as marked.     PAST MEDICAL HISTORY:  No past medical history on file.    PAST SURGICAL HISTORY:  Past Surgical History:   Procedure Laterality Date    ABDOMEN SURGERY  March 2019    Bariatric (Gastric Sleeve) Surgery    CHOLECYSTECTOMY  05/01/2006    stones.    LAPAROSCOPIC CHOLECYSTECTOMY  05/01/2006    [place], [place]- [facility]    LASIK Bilateral 01/01/2015    NH LAP, KRISTEN RESTRICT PROC, LONGITUDINAL GASTRECTOMY N/A 05/13/2019    Procedure: LAPAROSCOPIC SLEEVE GASTRECTOMY;  Surgeon: Marcos Hernández MD;  Location: [facility];  Service: General    VASECTOMY  04/30/2010    Dr Pickering at [facility]         CURRENT MEDICATIONS:    No current facility-administered medications for this encounter.    Current Outpatient Medications:     cholecalciferol, vitamin D3, 5,000 unit Tab, [CHOLECALCIFEROL, VITAMIN D3, 5,000 UNIT TAB] Take 5,000 Units by mouth daily., Disp: , Rfl:     fexofenadine (ALLEGRA) 180 MG tablet, [FEXOFENADINE (ALLEGRA) 180 MG TABLET] Take 180 mg by mouth every evening.       , Disp: , Rfl:      pediatric multivitamin-iron (POLY-VI-SOL WITH IRON) chewable tablet, [PEDIATRIC MULTIVITAMIN-IRON (POLY-VI-SOL WITH IRON) CHEWABLE TABLET] Chew 1 tablet 2 (two) times a day., Disp: , Rfl:     sildenafil (VIAGRA) 100 MG tablet, Take 1 tablet (100 mg) by mouth daily as needed (ed) (Patient not taking: Reported on 2024), Disp: 6 tablet, Rfl: 0    tadalafil (ADCIRCA/CIALIS) 20 MG tablet, Take 1 tablet (20 mg) by mouth every 48 hours as needed (ed) (Patient not taking: Reported on 2024), Disp: 6 tablet, Rfl: 0    ALLERGIES:  Allergies   Allergen Reactions    Meperidine Nausea and Vomiting    Sumatriptan      Annotation: heartburn, dizziness, upset stomach, would not want to use it again      Tramadol Nausea and Vomiting     Annotation: when broke toe, given tramadol, had nausea and vomiting from that         FAMILY HISTORY:  Family History   Problem Relation Age of Onset    Breast Cancer Mother     Myasthenia gravis Father     Snoring Father     Dementia Father     Breast Cancer Maternal Grandmother     ALS Paternal Grandmother     Asthma Daughter     No Known Problems Son        SOCIAL HISTORY:   Social History     Socioeconomic History    Marital status:     Number of children: 2   Tobacco Use    Smoking status: Former     Packs/day: 2.00     Years: 15.00     Additional pack years: 0.00     Total pack years: 30.00     Types: Cigarettes     Start date: 1995     Quit date: 2006     Years since quittin.4     Passive exposure: Never    Smokeless tobacco: Former     Types: Chew    Tobacco comments:     No   Vaping Use    Vaping Use: Never used   Substance and Sexual Activity    Alcohol use: Not Currently     Comment: Sober since 23    Drug use: No    Sexual activity: Yes     Partners: Female     Birth control/protection: Condom, Male Surgical     Comment: 2 kids 10-14 yo   Other Topics Concern    Parent/sibling w/ CABG, MI or angioplasty before 65F 55M? No   Social History Narrative     .  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     Social Determinants of Health     Interpersonal Safety: Low Risk  (2/7/2024)    Interpersonal Safety     Do you feel physically and emotionally safe where you currently live?: Yes     Within the past 12 months, have you been hit, slapped, kicked or otherwise physically hurt by someone?: No     Within the past 12 months, have you been humiliated or emotionally abused in other ways by your partner or ex-partner?: No       VITALS:  Patient Vitals for the past 24 hrs:   BP Temp Temp src Pulse Resp SpO2   02/12/24 1800 (!) 158/82 -- -- 54 -- 97 %   02/12/24 1737 (!) 173/103 97.1  F (36.2  C) -- 89 16 100 %   02/12/24 1736 -- 97.1  F (36.2  C) Oral -- 18 --        PHYSICAL EXAM    Constitutional:  Awake, alert, in no apparent distress  HENT:  Normocephalic, Atraumatic. Bilateral external ears normal. Oropharynx moist. Nose normal. Neck- Normal range of motion with no guarding, No midline cervical tenderness, Supple, No stridor.   Eyes:  PERRL, EOMI with no signs of entrapment, Conjunctiva normal, No discharge.   Respiratory:  Normal breath sounds, No respiratory distress, No wheezing.    Cardiovascular:  Normal rhythm, No appreciable rubs or gallops. Bradycardia.  GI:  Soft, No tenderness, No distension, No palpable masses  Musculoskeletal:  Intact distal pulses, No edema. Good range of motion in all major joints. No tenderness to palpation or major deformities noted.  Integument:  Warm, Dry, No erythema, No rash.   Neurologic:  Alert & oriented, Normal motor function, Normal sensory function, No focal deficits noted.   Psychiatric:  Affect normal, Judgment normal, Mood normal.     LAB:  All pertinent labs reviewed and interpreted.  Results for orders placed or performed during the hospital encounter of 02/12/24   Chest XR,  PA & LAT     Status: None (Preliminary result)    Narrative    EXAM: XR CHEST 2 VIEWS  LOCATION: [facility]  DATE:  2/12/2024    INDICATION: Chest pain on exertion.  COMPARISON: 02/12/2024.      Impression    IMPRESSION: Negative chest.   Basic metabolic panel     Status: Abnormal   Result Value Ref Range    Sodium 146 (H) 135 - 145 mmol/L    Potassium 5.1 3.4 - 5.3 mmol/L    Chloride 110 (H) 98 - 107 mmol/L    Carbon Dioxide (CO2) 29 22 - 29 mmol/L    Anion Gap 7 7 - 15 mmol/L    Urea Nitrogen 18.9 6.0 - 20.0 mg/dL    Creatinine 0.93 0.67 - 1.17 mg/dL    GFR Estimate >90 >60 mL/min/1.73m2    Calcium 9.5 8.6 - 10.0 mg/dL    Glucose 128 (H) 70 - 99 mg/dL   Troponin T, High Sensitivity     Status: Abnormal   Result Value Ref Range    Troponin T, High Sensitivity 59 (H) <=22 ng/L   Symptomatic Influenza A/B, RSV, & SARS-CoV2 PCR (COVID-19) Nasopharyngeal     Status: Normal    Specimen: Nasopharyngeal; Swab   Result Value Ref Range    Influenza A PCR Negative Negative    Influenza B PCR Negative Negative    RSV PCR Negative Negative    SARS CoV2 PCR Negative Negative    Narrative    Testing was performed using the Xpert Xpress CoV2/Flu/RSV Assay on the [employer] GeneXpert Instrument. This test should be ordered for the detection of SARS-CoV-2, influenza, and RSV viruses in individuals who meet clinical and/or epidemiological criteria. Test performance is unknown in asymptomatic patients. This test is for in vitro diagnostic use under the FDA EUA for laboratories certified under CLIA to perform high or moderate complexity testing. This test has not been FDA cleared or approved. A negative result does not rule out the presence of PCR inhibitors in the specimen or target RNA in concentration below the limit of detection for the assay. If only one viral target is positive but coinfection with multiple targets is suspected, the sample should be re-tested with another FDA cleared, approved, or authorized test, if coinfection would change clinical management. This test was validated by the [facility]. These laboratories  are certified under the Clinical Laboratory Improvement Amendments of 1988 (CLIA-88) as qualified to perform high complexity laboratory testing.   D dimer quantitative     Status: Normal   Result Value Ref Range    D-Dimer Quantitative 0.31 0.00 - 0.50 ug/mL FEU    Narrative    This D-dimer assay is intended for use in conjunction with a clinical pretest probability assessment model to exclude pulmonary embolism (PE) and deep venous thrombosis (DVT) in outpatients suspected of PE or DVT. The cut-off value is 0.50 ug/mL FEU.   CBC with platelets and differential     Status: None   Result Value Ref Range    WBC Count 9.4 4.0 - 11.0 10e3/uL    RBC Count 4.72 4.40 - 5.90 10e6/uL    Hemoglobin 15.5 13.3 - 17.7 g/dL    Hematocrit 43.8 40.0 - 53.0 %    MCV 93 78 - 100 fL    MCH 32.8 26.5 - 33.0 pg    MCHC 35.4 31.5 - 36.5 g/dL    RDW 13.0 10.0 - 15.0 %    Platelet Count 280 150 - 450 10e3/uL    % Neutrophils 67 %    % Lymphocytes 24 %    % Monocytes 7 %    % Eosinophils 1 %    % Basophils 1 %    % Immature Granulocytes 0 %    NRBCs per 100 WBC 0 <1 /100    Absolute Neutrophils 6.3 1.6 - 8.3 10e3/uL    Absolute Lymphocytes 2.3 0.8 - 5.3 10e3/uL    Absolute Monocytes 0.6 0.0 - 1.3 10e3/uL    Absolute Eosinophils 0.1 0.0 - 0.7 10e3/uL    Absolute Basophils 0.1 0.0 - 0.2 10e3/uL    Absolute Immature Granulocytes 0.0 <=0.4 10e3/uL    Absolute NRBCs 0.0 10e3/uL   CBC with platelets differential     Status: None    Narrative    The following orders were created for panel order CBC with platelets differential.  Procedure                               Abnormality         Status                     ---------                               -----------         ------                     CBC with platelets and d...[[id number]]                      Final result                 Please view results for these tests on the individual orders.   Results for orders placed or performed in visit on 02/12/24   XR Chest 2 Views     Status: None     Narrative    EXAM: XR CHEST 2 VIEWS  LOCATION: [facility]  DATE: 2/12/2024    INDICATION:  Chest pain, unspecified type  COMPARISON: 05/01/2019      Impression    IMPRESSION: Negative chest.   Results for orders placed or performed in visit on 02/12/24   Troponin T, High Sensitivity     Status: Abnormal   Result Value Ref Range    Troponin T, High Sensitivity 39 (H) <=22 ng/L   CBC with platelets and differential     Status: None   Result Value Ref Range    WBC Count 9.2 4.0 - 11.0 10e3/uL    RBC Count 4.81 4.40 - 5.90 10e6/uL    Hemoglobin 15.4 13.3 - 17.7 g/dL    Hematocrit 44.7 40.0 - 53.0 %    MCV 93 78 - 100 fL    MCH 32.0 26.5 - 33.0 pg    MCHC 34.5 31.5 - 36.5 g/dL    RDW 12.8 10.0 - 15.0 %    Platelet Count 274 150 - 450 10e3/uL    % Neutrophils 72 %    % Lymphocytes 21 %    % Monocytes 6 %    % Eosinophils 1 %    % Basophils 0 %    % Immature Granulocytes 0 %    Absolute Neutrophils 6.6 1.6 - 8.3 10e3/uL    Absolute Lymphocytes 1.9 0.8 - 5.3 10e3/uL    Absolute Monocytes 0.5 0.0 - 1.3 10e3/uL    Absolute Eosinophils 0.1 0.0 - 0.7 10e3/uL    Absolute Basophils 0.0 0.0 - 0.2 10e3/uL    Absolute Immature Granulocytes 0.0 <=0.4 10e3/uL   EKG 12-lead, tracing only     Status: None (Preliminary result)   Result Value Ref Range    Systolic Blood Pressure  mmHg    Diastolic Blood Pressure  mmHg    Ventricular Rate 52 BPM    Atrial Rate 52 BPM    UT Interval 140 ms    QRS Duration 104 ms     ms    QTc 425 ms    P Axis 61 degrees    R AXIS 59 degrees    T Axis 51 degrees    Interpretation ECG       Sinus bradycardia  Otherwise normal ECG  When compared with ECG of 01-MAY-2019 17:21,  Premature atrial complexes are no longer Present     CBC with platelets and differential     Status: None    Narrative    The following orders were created for panel order CBC with platelets and differential.  Procedure                               Abnormality         Status                     ---------                                -----------         ------                     CBC with platelets and d...[[id number]]                      Final result                 Please view results for these tests on the individual orders.        RADIOLOGY:  Reviewed all pertinent imaging. Please see official radiology report.  Chest XR,  PA & LAT    (Results Pending)       EKG:    Sinus bradycardia.  Rate of 49.  Normal QRS.  Normal ST segments.  Unchanged from tracing of earlier today.  I have independently reviewed and interpreted the EKG(s) documented above.           I, Jeff Braden, am serving as a scribe to document services personally performed by Alec Weaver MD, based on my observation and the provider's statements to me. I, Alec Weaver MD attest that Jeff Braden is acting in a scribe capacity, has observed my performance of the services and has documented them in accordance with my direction.    Alec Weaver M.D.  Emergency Medicine  [facility] EMERGENCY DEPARTMENT     Alec Weaver MD  02/12/24 [id number]       Alec Weaver MD  02/12/24 [id number]

## 2024-02-13 NOTE — PLAN OF CARE
PRIMARY DIAGNOSIS: CHEST PAIN  OUTPATIENT/OBSERVATION GOALS TO BE MET BEFORE DISCHARGE:  1. Ruled out acute coronary syndrome (negative or stable Troponin):  Yes  2. Pain Status: Pain free.  3. Appropriate provocative testing performed: N/A  - Stress Test Procedure: ecg  - Interpretation of cardiac rhythm per telemetry tech: Sinus Bradycardia    4. Cleared by Consultants (if applicable):No  5. Return to near baseline physical activity: Yes  Discharge Planner Nurse   Safe discharge environment identified: Yes  Barriers to discharge: Yes       Entered by: Lawson Knight RN 02/13/2024 5:38 AM     Please review provider order for any additional goals.   Nurse to notify provider when observation goals have been met and patient is ready for discharge.  Goal Outcome Evaluation: Pt is alert and oriented. Denies pain or discomfort.

## 2024-02-13 NOTE — CONSULTS
"      Cardiology Consult Note    Thank you, Dr. Bond, for asking the [facility] team to see Lawson Daniels in consultation at [facility] to evaluate chest pain.      Assessment:   1.  Chest pain, exertional in nature.  ECG shows no acute changes, however, troponin gradually rising from 39 at time of presentation to 69.  This would be suspicious for acute coronary syndrome.  At this point, would favor coronary angiography to define anatomy and potential revascularization options.  2.  Elevated blood pressure without prior history of hypertension.  This could be contributing to the patient's symptoms.  Will initiate losartan 25 mg daily.  Clinically Significant Risk Factors Present on Admission         # Hypernatremia: Highest Na = 146 mmol/L in last 2 days, will monitor as appropriate               # Overweight: Estimated body mass index is 26.47 kg/m  (pended) as calculated from the following:    Height as of this encounter: (P) 1.803 m (5' 11\").    Weight as of this encounter: (P) 86.1 kg (189 lb 13.1 oz).              Plan:   1.  Initiate losartan 25 mg daily  2.  Pursue coronary angiography with further recommendations to follow      Primary cardiologist: None     Current History:   Mr. Lawson Daniels is a 44 year old male with with history of morbid obesity now status post 170 pound weight loss due to exercise and diet who presented to the ED for evaluation of exertional chest discomfort.  Approximately 1 week ago, the patient was out for one of his 7 mile runs and was about correction through when he developed tightness in his chest.  He continued to run but his symptoms intensified and began to radiate into his shoulders and down his arms with tingling of his hands and some mild diaphoresis.  He stopped running and walked back home with gradual resolution of his discomfort.  The following day, he went out for another run, but at a slower pace and again noted tightening in his chest but " no radiation into the arms.  This again resolved with rest.  He was well until yesterday when he went out for his usual run.  He had no issues initially but after returning home and retrieving another dog and starting to run again, he again noted tightening in his chest.  This prompted him to go to urgent care where an ECG showed no acute ischemic changes; however, his troponin was mildly elevated.  He was sent here to the ED for evaluation where initial troponin was 39.  ECG here demonstrated sinus bradycardia but without acute changes.  His troponins of since risen to 69 and cardiac consult requested.  He denies any ongoing symptoms of chest discomfort this morning.  Denies any family history of premature CAD.    Past Medical History:   No past medical history on file.    Past Surgical History:     Past Surgical History:   Procedure Laterality Date    ABDOMEN SURGERY  March 2019    Bariatric (Gastric Sleeve) Surgery    CHOLECYSTECTOMY  05/01/2006    stones.    LAPAROSCOPIC CHOLECYSTECTOMY  05/01/2006    [place], [place] [facility]    LASIK Bilateral 01/01/2015    ID LAP, KRISTEN RESTRICT PROC, LONGITUDINAL GASTRECTOMY N/A 05/13/2019    Procedure: LAPAROSCOPIC SLEEVE GASTRECTOMY;  Surgeon: Marcos Hernández MD;  Location: [facility] OR;  Service: General    VASECTOMY  04/30/2010    Dr Pickering at [facility]       Family History:     Family History   Problem Relation Age of Onset    Breast Cancer Mother     Myasthenia gravis Father     Snoring Father     Dementia Father     Breast Cancer Maternal Grandmother     ALS Paternal Grandmother     Asthma Daughter     No Known Problems Son        Social History:    reports that he quit smoking about 17 years ago. His smoking use included cigarettes. He started smoking about 28 years ago. He has a 30 pack-year smoking history. He has never been exposed to tobacco smoke. He has quit using smokeless tobacco.  His smokeless tobacco use included chew. He reports  "that he does not currently use alcohol. He reports that he does not use drugs.    Meds:     Current Facility-Administered Medications:     acetaminophen (TYLENOL) tablet 650 mg, 650 mg, Oral, Q4H PRN, Johnathan Bond MD    nitroGLYcerin (NITROSTAT) sublingual tablet 0.4 mg, 0.4 mg, Sublingual, Q5 Min PRN, Johnathan Bond MD    ondansetron (ZOFRAN ODT) ODT tab 4 mg, 4 mg, Oral, Q6H PRN **OR** ondansetron (ZOFRAN) injection 4 mg, 4 mg, Intravenous, Q6H PRN, Johnathan Bond MD    senna-docusate (SENOKOT-S/PERICOLACE) 8.6-50 MG per tablet 1 tablet, 1 tablet, Oral, BID PRN **OR** senna-docusate (SENOKOT-S/PERICOLACE) 8.6-50 MG per tablet 2 tablet, 2 tablet, Oral, BID PRN, Johnathan Bond MD      Allergies:   Meperidine, Sumatriptan, and Tramadol    Review of Systems:   A 12 point comprehensive review of systems was negative except as noted in the current history.    Objective:      Physical Exam  Body mass index is 26.47 kg/m  (pended).  BP (!) 145/89 (BP Location: Left arm)   Pulse (!) 49   Temp 97.9  F (36.6  C) (Oral)   Resp 16   Ht (P) 1.803 m (5' 11\")   Wt (P) 86.1 kg (189 lb 13.1 oz)   SpO2 97%   BMI (P) 26.47 kg/m      General Appearance:   Well-developed, well-nourished middle-age male in no acute distress   HEENT:  Normocephalic, atraumatic.  Sclera nonicteric.  Mucous membranes moist   Neck: No jugular venous distention   Chest: Symmetric with normal AP diameter   Lungs:   Clear to auscultation and percussion bilaterally   Cardiovascular:   Regular rate and rhythm.  S1, S2 normal.  No murmur or gallop   Abdomen:  Soft, nondistended, nontender.  No organomegaly or mass   Extremities: No peripheral edema, clubbing or cyanosis   Skin: No rash or lesions   Neurologic: Alert and oriented x 3.  No gross focal deficit             Cardiographics (personally reviewed)  ECG demonstrates sinus bradycardia, incomplete right bundle branch block.  No acute ST or T wave abnormality    Imaging (personally " "reviewed)  Echocardiogram ordered currently pending.    Lab Review (personally reviewed all results)  Recent Labs   Lab Test 04/17/23  0911   CHOL 128   HDL 59   LDL 63   TRIG 31     Recent Labs   Lab Test 04/17/23  0911 03/20/19  1434 01/12/18  1523   LDL 63 99 101     Recent Labs   Lab Test 02/12/24  1755   *   POTASSIUM 5.1   CHLORIDE 110*   CO2 29   *   BUN 18.9   CR 0.93   GFRESTIMATED >90   SHON 9.5     Recent Labs   Lab Test 02/12/24  1755 02/12/24  1517 04/17/23  0911   CR 0.93 0.87 0.91     Recent Labs   Lab Test 11/13/19  0904 03/20/19  1434 01/12/18  1523   A1C 4.7 5.0 5.7          Recent Labs   Lab Test 02/12/24  1755   WBC 9.4   HGB 15.5   HCT 43.8   MCV 93        Recent Labs   Lab Test 02/12/24  1755 02/12/24  1517 04/17/23  0911   HGB 15.5 15.4 15.1    No results for input(s): \"TROPONINI\" in the last 15211 hours.  No results for input(s): \"BNP\", \"NTBNPI\", \"NTBNP\" in the last 43025 hours.  Recent Labs   Lab Test 04/17/23  0911   TSH 1.66     Recent Labs   Lab Test 05/01/19  1703 07/09/18  1127   INR 1.07 1.05                 "

## 2024-02-13 NOTE — PROGRESS NOTES
PRIMARY DIAGNOSIS: CHEST PAIN  OUTPATIENT/OBSERVATION GOALS TO BE MET BEFORE DISCHARGE:  1. Ruled out acute coronary syndrome (negative or stable Troponin):  Yes  2. Pain Status: Pain free.  3. Appropriate provocative testing performed: N/A  - Stress Test Procedure: ekg  - Interpretation of cardiac rhythm per telemetry tech: Sinus bradycardia    4. Cleared by Consultants (if applicable):No  5. Return to near baseline physical activity: No  Discharge Planner Nurse   Safe discharge environment identified: No  Barriers to discharge: Yes       Entered by: Claire Umanzor RN 02/12/2024 10:03 PM     Patient alert and oriented x4. In speaking with patient, he admits to exercise addiction and a high anxiety personality. Patient did mention that he sees a therapist for these issues. Patient runs 30-40 miles a week. Had a gastric sleeve placed years ago which has helped him lose 180 lbs per patient. Last Troponin was 59. Patient is independent in the room for mobility. Patient is Full Code. Patient was asked about an Advanced Directive and patient was unsure about it at present. Was told if he changed his mind, he could learn more about them from a provider.  Patient is resting comfortably in bed with eyes closed.

## 2024-02-13 NOTE — MEDICATION SCRIBE - ADMISSION MEDICATION HISTORY
Medication Scribe Admission Medication History    Admission medication history is complete. The information provided in this note is only as accurate as the sources available at the time of the update.    Information Source(s): Patient via in-person    Pertinent Information: Sildenafil 100 mg and tadalafil 20 mg dispensed but patient has not started taking them as of 2/12/2024.    Changes made to PTA medication list:  Added: None  Deleted: None  Changed: None    Allergies reviewed with patient and updates made in EHR: yes    Medication History Completed By: Mustapha Umana 2/12/2024 7:50 PM    Prior to Admission medications    Medication Sig Last Dose Taking? Auth Provider Long Term End Date   cholecalciferol, vitamin D3, 5,000 unit Tab [CHOLECALCIFEROL, VITAMIN D3, 5,000 UNIT TAB] Take 5,000 Units by mouth daily. 2/12/2024 at AM Yes Provider, Historical     fexofenadine (ALLEGRA) 180 MG tablet [FEXOFENADINE (ALLEGRA) 180 MG TABLET] Take 180 mg by mouth every evening.        2/12/2024 at AM Yes Provider, Historical     pediatric multivitamin-iron (POLY-VI-SOL WITH IRON) chewable tablet [PEDIATRIC MULTIVITAMIN-IRON (POLY-VI-SOL WITH IRON) CHEWABLE TABLET] Chew 1 tablet 2 (two) times a day. 2/12/2024 at AM Yes Provider, Historical     sildenafil (VIAGRA) 100 MG tablet Take 1 tablet (100 mg) by mouth daily as needed (ed) New Rx at Has not started. Yes Manny Candelaria MD Yes    tadalafil (ADCIRCA/CIALIS) 20 MG tablet Take 1 tablet (20 mg) by mouth every 48 hours as needed (ed) New Rx at Has not started. Yes Manny Candelaria MD Yes

## 2024-02-13 NOTE — PRE-PROCEDURE
GENERAL PRE-PROCEDURE:   Procedure:  Coronary angiogram, possible percutaneous coronary intervention  Date/Time:  2/13/2024 2:02 PM    Written consent obtained?: Yes    Risks and benefits: Risks, benefits and alternatives were discussed    Consent given by:  Patient  Patient states understanding of procedure being performed: Yes    Patient's understanding of procedure matches consent: Yes    Procedure consent matches procedure scheduled: Yes    Expected level of sedation:  Moderate  Appropriately NPO:  Yes  ASA Class:  4 (Elevated troponins, chest pain, elevated blood pressure, former smoker)  Mallampati  :  Grade 4- soft palate obscured by base of tongue  Lungs:  Lungs clear with good breath sounds bilaterally  Heart:  Normal heart sounds and rate  History & Physical reviewed:  History and physical reviewed and updates made (see comment)  H&P Comments:  History & Physical reviewed:  History and physical reviewed and updates made (see comment)  H&P Comments:  Clinically Significant Risk Factors Present on Admission     Cardiovascular : Elevated troponins, chest pain, elevated blood pressure, former smoker     Fluid & Electrolyte Disorders : Not present on admission     Gastroenterology : Not present on admission     Hematology/Oncology : Not present on admission     Nephrology : Not present on admission     Neurology : Not present on admission     Pulmonology : Former tobacco use disorder     Systemic : Not present on admission    Statement of review:  I have reviewed the lab findings, diagnostic data, medications, and the plan for sedation    Statement of review:  I have reviewed the lab findings, diagnostic data, medications, and the plan for sedation

## 2024-02-13 NOTE — ED NOTES
[facility] ED Handoff Report    ED Chief Complaint:  chest pain     ED Diagnosis:  (R79.89) Elevated troponin  Comment:    Plan:      (R07.9) Exertional chest pain  Comment:    Plan:         PMH:  No past medical history on file.     Code Status:  No Order     Falls Risk: No Band: Not applicable    Current Living Situation/Residence: lives in a house     Elimination Status: Continent: Yes     Activity Level: Independent    Patients Preferred Language:  English     Needed: No    Vital Signs:  BP (!) 142/78   Pulse 55   Temp 97.1  F (36.2  C)   Resp 16   SpO2 97%      Cardiac Rhythm: NSR    Pain Score: 0/10    Is the Patient Confused:  No    Last Food or Drink: 02/12/24 at      Focused Assessment:  cardiac    Tests Performed: Done: Labs and Imaging    Treatments Provided:  aspirin    Family Dynamics/Concerns: No    Family Updated On Visitor Policy: Yes    Plan of Care Communicated to Family: Yes    Who Was Updated about Plan of Care:      Belongings Checklist Done and Signed by Patient: No    Medications sent with patient:      Covid: asymptomatic , negative    Additional Information:      RN: Manny De Jesus RN    2/12/2024 7:36 PM

## 2024-02-13 NOTE — PROGRESS NOTES
"[facility]    Medicine Progress Note - Hospitalist Service    Date of Admission:  2/12/2024    Assessment & Plan   Lawson Daniels is a 44 year old male admitted on 2/12/2024 for chest pain associated with exertion.      Chest pain with exertion   Unstable angina   Exertional chest pain for the past several days. Only present while running per patient report, associated left arm numbness and tingling. Non-smoker  - CXR negative    - Trop: 39 --> 59 --> 69 --> 44  - EKG showed sinus bradycardia, incomplete RBBB without acute ischemic changes. Repeat EKG 2/13 without significant changes  - Per ER physician discussion with on call cardiologist, no indication to start heparin drip.  - Lipid panel within normal limits   - Telemetry, continue   - Nitroglycerin prn  - Echo done 2/13 showing normal LV function, EF 55-60%, no regional wall motion abnormalities. LA moderately dilated, aortic root and ascending dilatation present   - Appreciate cardiology consult; plan for coronary angio this afternoon     Elevated BP without prior history of hypertension   - SBPs in the 150s   - started on losartan 25 mg daily by cardiology        Observation Goals: -diagnostic tests and consults completed and resulted, -vital signs normal or at patient baseline, Nurse to notify provider when observation goals have been met and patient is ready for discharge.  Diet: Low Saturated Fat Na <2400 mg    DVT Prophylaxis: Ambulate every shift  Gastelum Catheter: Not present  Lines: None     Cardiac Monitoring: ACTIVE order. Indication: Chest pain/ ACS rule out (24 hours)  Code Status: Full Code      Clinically Significant Risk Factors Present on Admission         # Hypernatremia: Highest Na = 146 mmol/L in last 2 days, will monitor as appropriate               # Overweight: Estimated body mass index is 26.47 kg/m  (pended) as calculated from the following:    Height as of this encounter: (P) 1.803 m (5' 11\").    Weight as of " "this encounter: (P) 86.1 kg (189 lb 13.1 oz).              Disposition Plan      Expected Discharge Date: 02/13/2024                The patient's care was discussed with the Attending Physician, Dr. Jeimy Durham who independently met with and assessed the patient and is agreement with the assessment and plan     Judy Davis PA-C  Hospitalist Service  [facility]  Securely message with [employer] (more info)  Text page via [employer] Paging/Directory   ______________________________________________________________________    Interval History   Patient currently without chest pain. Says he does not have chest pain or discomfort at rest, only when his HR is above 100 bpm. When present, describes it more of a \"chest tightness.\" No history of cigarette or tobacco use, no history of hyperlipidemia per patient report.    Had gastric sleeve surgery in 2019. Very active runner, states he runs 30-40 miles weekly.      Physical Exam   Vital Signs: Temp: 97.9  F (36.6  C) Temp src: Oral BP: (!) 145/89 Pulse: (!) 49   Resp: 16 SpO2: 97 % O2 Device: None (Room air)    Weight: 0 lbs 0 oz    Constitutional: awake, alert, no apparent distress, and appears stated age  Respiratory: No increased work of breathing, no accessory muscle use, clear to auscultation bilaterally, no crackles or wheezing  Cardiovascular: Regular rate and rhythm, normal S1 and S2, no S3 or S4, and no murmur noted  Skin: Normal skin color, texture, turgor. No rashes and no jaundice  Neuropsychiatric: Appropriate mood, affect and eye contact. Cooperative.    Medical Decision Making             Data     I have personally reviewed the following data over the past 24 hrs:    9.4  \   15.5   / 280     146 (H) 110 (H) 18.9 /  128 (H)   5.1 29 0.93 \     Trop: 44 (H) BNP: N/A     INR:  N/A PTT:  N/A   D-dimer:  0.31 Fibrinogen:  N/A       Imaging results reviewed over the past 24 hrs:   Recent Results (from the past 24 hour(s))   XR Chest 2 Views    " Narrative    EXAM: XR CHEST 2 VIEWS  LOCATION: [facility]  DATE: 2024    INDICATION:  Chest pain, unspecified type  COMPARISON: 2019      Impression    IMPRESSION: Negative chest.   Chest XR,  PA & LAT    Narrative    EXAM: XR CHEST 2 VIEWS  LOCATION: [facility]  DATE: 2024    INDICATION: Chest pain on exertion.  COMPARISON: 2024.      Impression    IMPRESSION: Negative chest.   Echocardiogram Complete   Result Value    LVEF  55-60%    Narrative    [id number]  [id number]  [id number]  [id number]^[place] [address]     Name: CHRISTINE GALLO  MRN: [id number]  : 1980  Study Date: 2024 08:04 AM  Age: 44 yrs  Gender: Male  Patient Location: [facility]  Reason For Study: Unstable Angina  Ordering Physician: ALLISON SOMERS  Referring Physician: KAIN GOLDSMITH  Performed By: PERRI     BSA: 2.1 m2  Height: 71 in  Weight: 190 lb  HR: 48  BP: 145/89 mmHg  ______________________________________________________________________________  Procedure  Complete Portable Echo Adult.  ______________________________________________________________________________  Interpretation Summary     Left ventricular function is normal.The ejection fraction is 55-60%.  No regional wall motion abnormalities noted.  Normal right ventricle size and systolic function.  The left atrium is moderately dilated.  Aortic root(4.4cm) and ascending(4.0 cm) dilatation is present.  No hemodynamically significant valvular abnormalities on 2D or color flow  imaging.  ______________________________________________________________________________  Left Ventricle  The left ventricle is borderline dilated. Left ventricular function is  normal.The ejection fraction is 55-60%. There is mild eccentric left  ventricular hypertrophy. Left ventricular diastolic function is indeterminate.  No regional wall motion abnormalities noted.     Right  Ventricle  Normal right ventricle size and systolic function.     Atria  The left atrium is moderately dilated. Right atrial size is normal. There is  no color Doppler evidence of an atrial shunt.     Mitral Valve  Mitral valve leaflets appear normal. There is no evidence of mitral stenosis  or clinically significant mitral regurgitation. There is trace to mild mitral  regurgitation.     Tricuspid Valve  Tricuspid valve leaflets appear normal. There is no evidence of tricuspid  stenosis or clinically significant tricuspid regurgitation.     Aortic Valve  Aortic valve leaflets appear normal. There is no evidence of aortic stenosis  or clinically significant aortic regurgitation.     Pulmonic Valve  The pulmonic valve is not well seen, but is grossly normal. This degree of  valvular regurgitation is within normal limits.     Vessels  Aortic root and ascending dilatation is present. IVC diameter and respiratory  changes fall into an intermediate range suggesting an RA pressure of 8 mmHg.     Pericardium  There is no pericardial effusion.     ______________________________________________________________________________  MMode/2D Measurements & Calculations  IVSd: 1.3 cm  LVIDd: 5.5 cm  LVIDs: 3.4 cm  LVPWd: 1.3 cm  FS: 38.9 %  LV mass(C)d: 289.7 grams  LV mass(C)dI: 140.7 grams/m2     Ao root diam: 4.4 cm  asc Aorta Diam: 4.0 cm  LVOT diam: 2.7 cm  LVOT area: 5.7 cm2  Ao root diam index Ht(cm/m): 2.4  Ao root diam index BSA (cm/m2): 2.1  Asc Ao diam index BSA (cm/m2): 1.9  Asc Ao diam index Ht(cm/m): 2.2  LA Volume (BP): 119.0 ml  LA Volume Index (BP): 57.8 ml/m2     LA Volume Indexed (AL/bp): 60.1 ml/m2  RWT: 0.46     Time Measurements  MM HR: 48.0 BPM     Doppler Measurements & Calculations  MV E max kulwinder: 54.5 cm/sec  MV A max kulwinder: 75.2 cm/sec  MV E/A: 0.72  MV dec slope: 249.0 cm/sec2  MV dec time: 0.22 sec  Ao V2 max: 158.0 cm/sec  Ao max PG: 10.0 mmHg  Ao V2 mean: 103.0 cm/sec  Ao mean P.0 mmHg  Ao V2 VTI:  35.2 cm  CATHERINE(I,D): 3.9 cm2  CATHERINE(V,D): 3.6 cm2     LV V1 max PG: 3.9 mmHg  LV V1 max: 98.2 cm/sec  LV V1 VTI: 24.2 cm  SV(LVOT): 138.6 ml  SI(LVOT): 67.3 ml/m2  Pulm Sys Krishna: 52.9 cm/sec  Pulm Henderson Krishna: 35.1 cm/sec  Pulm A Revs Krishna: 26.3 cm/sec  Pulm S/D: 1.5  AV Krishna Ratio (DI): 0.62  CATHERINE Index (cm2/m2): 1.9  E/E': 7.0  E/E' av.1  Lateral E/e': 5.1  Medial E/e': 7.0  Peak E' Krishna: 7.8 cm/sec     ______________________________________________________________________________  Report approved by: Jaci Bird 2024 11:51 AM

## 2024-02-13 NOTE — DISCHARGE INSTRUCTIONS

## 2024-02-13 NOTE — PROGRESS NOTES
Care Management Initial Consult    General Information  Assessment completed with:  ,patient       Primary Care Provider verified and updated as needed:   yes  Readmission within the last 30 days:   no        Advance Care Planning:     no documents       Communication Assessment  Patient's communication style: spoken language (English or Bilingual)    Hearing Difficulty or Deaf: no   Wear Glasses or Blind: no    Cognitive  Cognitive/Neuro/Behavioral: WDL                      Living Environment:   People in home:  alone     Current living Arrangements:        Able to return to prior arrangements:  yes       Family/Social Support:  Care provided by: self   Provides care for:  has teen aged children, not living with him                Description of Support System:    patient did not want to identify an emergency contact       Current Resources:   Patient receiving home care services:  no     Community Resources:  none  Equipment currently used at home:    Supplies currently used at home:      Employment/Financial:  Employment Status:   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  No    Lifestyle & Psychosocial Needs:  Social Determinants of Health     Food Insecurity: Not on file   Depression: Not at risk (2/7/2024)    PHQ-2     PHQ-2 Score: 2   Housing Stability: Not on file   Tobacco Use: Medium Risk (2/12/2024)    Patient History     Smoking Tobacco Use: Former     Smokeless Tobacco Use: Former     Passive Exposure: Never   Financial Resource Strain: Not on file   Alcohol Use: Not on file   Transportation Needs: Not on file   Physical Activity: Not on file   Interpersonal Safety: Low Risk  (2/7/2024)    Interpersonal Safety     Do you feel physically and emotionally safe where you currently live?: Yes     Within the past 12 months, have you been hit, slapped, kicked or otherwise physically  hurt by someone?: No     Within the past 12 months, have you been humiliated or emotionally abused in other ways by your partner or ex-partner?: No   Stress: Not on file   Social Connections: Not on file       Functional Status:  Prior to admission patient needed assistance:              Mental Health Status: has anxiety and sees a therapist regularly          Chemical Dependency Status: sober x 3 years                Values/Beliefs:  Spiritual, Cultural Beliefs, Restorationism Practices, Values that affect care:  Moravian               Additional Information:    Patient is alert,oriented and independent at baseline. He is divoriced. He expects to discharge home with OP follow up as needed. Friend to transport.  MILLICENT Fulton

## 2024-02-13 NOTE — H&P
"[facility]    History and Physical - Hospitalist Service       Date of Admission:  2/12/2024    Assessment & Plan      Lawson Daniels is a 44 year old male admitted on 2/12/2024 for chest pain associated with exertion.     Unstable angina:  Presents with recurrent exertional chest pain needs longer time to resolve.  Had borderline elevated troponin. D dimer normal.  EKG showed sinus bradycardia, incomplete RBBB without acute ischemic changes.   At the time of admission, patient reports that his chest pain has improved.   No family history of heart disease.  - Per ER physician discussion with on call cardiologist, no indication to start heparin drip.  - Telemetry  - Monitor troponin. If trending up, low threshold to start heparin drip.  - Echocardiogram  - Nitroglycerin prn  - Cardiology consult          Diet:  Cardiac diet  DVT Prophylaxis: Low Risk/Ambulatory with no VTE prophylaxis indicated  Gastelum Catheter: Not present  Lines: None     Cardiac Monitoring: None  Code Status:  Full code    Clinically Significant Risk Factors Present on Admission         # Hypernatremia: Highest Na = 146 mmol/L in last 2 days, will monitor as appropriate               # Overweight: Estimated body mass index is 28.76 kg/m  as calculated from the following:    Height as of 2/7/24: 1.791 m (5' 10.51\").    Weight as of 2/7/24: 92.3 kg (203 lb 6.4 oz).              Disposition Plan      Expected Discharge Date: 02/13/2024                  Johnathan Bond MD  Hospitalist Service  [facility]  Securely message with [employer] (more info)  Text page via [employer] Paging/Directory     ______________________________________________________________________    Chief Complaint   Chest pain    History is obtained from the patient    History of Present Illness   Lawson Daniels is a 44 year old male with a medical history of gastric sleeve and cholecystectomy presents to ED for acute chest pain.  Patient " reports that he is a runner.  He normally runs 3 times a week.  5 days ago, he planned to have a 10K run.  About 3 miles into the run, he developed retrosternal chest pain, radiating to his left shoulder and left upper arm.  He also felt diaphoretic.  He stopped running and walked about 30 minutes to go back home.  After he returned home, he took Tums and rested for 5 to 10 minutes.  His chest pain then resolved.  3 days ago, he had another 10K run.  He normally runs 9.5 minutes per mile. He ran at a slower pace that day. About 5 miles into the run, he felt chest tightness again.  He stopped the run.  It took him a long time for the chest tightness to resolve.  Today, he had another run.  He ran at much slower pace at 11 min per mile.  He was able to complete 5K run.  However, when he tried to run his second 5K, he developed chest tightness again.  After resting, his chest tightness did not resolve.  He then decided to come to ER for evaluation.  He reports no fever, cough, shortness of breath, and abdominal pain.  He reports no family history of heart disease.  He took 2 marijuana, a few weeks ago.  He denies alcohol or recreational drug use.  He reports no smoking. In Er, he was found to have borderline elevated troponin. EKG showed sinus bradycardia, incomplete RBBB without acute ischemic changes.       Past Medical History    No past medical history on file.    Past Surgical History   Past Surgical History:   Procedure Laterality Date    ABDOMEN SURGERY  March 2019    Bariatric (Gastric Sleeve) Surgery    CHOLECYSTECTOMY  05/01/2006    stones.    LAPAROSCOPIC CHOLECYSTECTOMY  05/01/2006    [place], [place]- [facility]    LASIK Bilateral 01/01/2015    DE LAP, KRISTEN RESTRICT PROC, LONGITUDINAL GASTRECTOMY N/A 05/13/2019    Procedure: LAPAROSCOPIC SLEEVE GASTRECTOMY;  Surgeon: Marcos Hernández MD;  Location: [facility];  Service: General    VASECTOMY  04/30/2010    Dr Pickering at [facility]        Prior to Admission Medications   Prior to Admission Medications   Prescriptions Last Dose Informant Patient Reported? Taking?   cholecalciferol, vitamin D3, 5,000 unit Tab 2/12/2024 at AM  Yes Yes   Sig: [CHOLECALCIFEROL, VITAMIN D3, 5,000 UNIT TAB] Take 5,000 Units by mouth daily.   fexofenadine (ALLEGRA) 180 MG tablet 2/12/2024 at AM  Yes Yes   Sig: [FEXOFENADINE (ALLEGRA) 180 MG TABLET] Take 180 mg by mouth every evening.          pediatric multivitamin-iron (POLY-VI-SOL WITH IRON) chewable tablet 2/12/2024 at AM  Yes Yes   Sig: [PEDIATRIC MULTIVITAMIN-IRON (POLY-VI-SOL WITH IRON) CHEWABLE TABLET] Chew 1 tablet 2 (two) times a day.   sildenafil (VIAGRA) 100 MG tablet New Rx at Has not started.  No Yes   Sig: Take 1 tablet (100 mg) by mouth daily as needed (ed)   tadalafil (ADCIRCA/CIALIS) 20 MG tablet New Rx at Has not started.  No Yes   Sig: Take 1 tablet (20 mg) by mouth every 48 hours as needed (ed)      Facility-Administered Medications: None        Review of Systems    The 10 point Review of Systems is negative other than noted in the HPI or here.      Physical Exam   Vital Signs: Temp: 97.1  F (36.2  C) Temp src: Oral BP: (!) 161/84 Pulse: 50   Resp: 16 SpO2: 98 % O2 Device: None (Room air)    Weight: 0 lbs 0 oz    General appearance: not in acute distress  HEENT: PERRL, EOMI  Lungs: Clear breath sounds in bilateral lung fields  Cardiovascular: Regular rate and rhythm, normal S1-S2  Abdomen: Soft, non tender, no distension  Musculoskeletal: No joint swelling  Skin: No rash and no edema  Neurology: AAO ×3.  Cranial nerves II - XII normal.  Normal muscle strength in all four extremities.     Medical Decision Making       60 MINUTES SPENT BY ME on the date of service doing chart review, history, exam, documentation & further activities per the note.      Data     I have personally reviewed the following data over the past 24 hrs:    9.4  \   15.5   / 280     146 (H) 110 (H) 18.9 /  128 (H)   5.1 29 0.93  \     Trop: 59 (H) BNP: N/A     INR:  N/A PTT:  N/A   D-dimer:  0.31 Fibrinogen:  N/A       Imaging results reviewed over the past 24 hrs:   Recent Results (from the past 24 hour(s))   XR Chest 2 Views    Narrative    EXAM: XR CHEST 2 VIEWS  LOCATION: [facility]  DATE: 2/12/2024    INDICATION:  Chest pain, unspecified type  COMPARISON: 05/01/2019      Impression    IMPRESSION: Negative chest.   Chest XR,  PA & LAT    Narrative    EXAM: XR CHEST 2 VIEWS  LOCATION: [facility]  DATE: 2/12/2024    INDICATION: Chest pain on exertion.  COMPARISON: 02/12/2024.      Impression    IMPRESSION: Negative chest.

## 2024-02-14 NOTE — DISCHARGE SUMMARY
"[facility]  Hospitalist Discharge Summary      Date of Admission:  2/12/2024  Date of Discharge:  2/13/2024  6:45 PM  Discharging Provider: Judy Davis PA-C  Discharge Service: Hospitalist Service    Discharge Diagnoses   Chest Pain - angiogram showed no obstructive coronary artery disease   Elevated blood pressure - hypertension suspected    Clinically Significant Risk Factors     # Overweight: Estimated body mass index is 26.36 kg/m  as calculated from the following:    Height as of this encounter: 1.803 m (5' 11\").    Weight as of this encounter: 85.7 kg (189 lb).       Follow-ups Needed After Discharge   Follow-up Appointments     Follow-up and recommended labs and tests       Follow up with primary care provider, NETTE HUNTER, within 7 days to   evaluate medication change and for hospital follow- up.  The following   labs/tests are recommended: LDL was stable here.  Recommend follow-up BMP   with start of antihypertensive medication and further blood pressure   medication titration.  Also can discuss when safe to resume ED medication.              Discharge Disposition   Discharged to home  Condition at discharge: Stable    Hospital Course   Lawson Daniels is a 44 year old male admitted on 2/12/2024 for chest pain associated with exertion. Patient presented to the ED after being seen outpatient for evaluation of exertional chest pain for several days. Patient was found to have slightly elevated troponin and was admitted for further cardiac evaluation. EKG in ED showing sinus bradycardia, incomplete RBBB without ischemic changes. Delta troponin peaked at 69 before trending downwards. Echo was done 2/13 which showed normal LV function, EF 55-60%, no regional wall motion abnormalities. A coronary angiogram was performed 2/13 which demonstrated ectatic vessels, no obstructive CAD; normal L-sided filling pressures. Patient had no recurrence of chest pain while hospitalized. Discharged " with low dose ASA and losartan and to follow-up with PCP. Discharged home in stable condition.      Chest pain with exertion   Exertional chest pain for the past several days. Only present while running per patient report, associated left arm numbness and tingling. Non-smoker  Without symptoms while hospitalized.  - CXR negative    - Trop: 39 --> 59 --> 69 --> 44  - EKG showed sinus bradycardia, incomplete RBBB without acute ischemic changes. Repeat EKG 2/13 without significant changes  - Per ER physician discussion with on call cardiologist, no indication to start heparin drip.  - Lipid panel within normal limits   - Telemetry, continue   - Nitroglycerin prn  - Echo done 2/13 showing normal LV function, EF 55-60%, no regional wall motion abnormalities. LA moderately dilated, aortic root and ascending dilatation present   - Coronary Angio done 2/13: ectatic vessels, no obstructive CAD; normal L-sided filling pressures  - Appreciate cardiology consult; continue ASA at discharge  - to follow-up with PCP     Elevated BP without prior history of hypertension   - SBPs in the 150s   - started on losartan 25 mg daily by cardiology, continued at discharge  - outpatient follow-up with PCP     Consultations This Hospital Stay   CARDIOLOGY IP CONSULT  PHARMACY IP CONSULT  PHARMACY IP CONSULT  SMOKING CESSATION PROGRAM IP CONSULT    Code Status   Prior    This case was discussed with the Attending Physician, Dr. Jeimy Durham, who independently met with and assessed the patient and who is in agreement with the disposition and discharge.    Judy Davis PA-C  [facility] EXTENDED RECOVERY AND SHORT STAY  [address] [place] [id number]  Phone: [phone]  Fax: [phone]  ______________________________________________________________________    Physical Exam   Vital Signs: Temp: 97.9  F (36.6  C) Temp src: Oral BP: (!) 140/83 Pulse: 52   Resp: 16 SpO2: 99 % O2 Device: None (Room air)    Weight:  189 lbs 0 oz    Constitutional: awake, alert, no apparent distress, and appears stated age  Respiratory: No increased work of breathing, no accessory muscle use, clear to auscultation bilaterally, no crackles or wheezing  Cardiovascular: Regular rate and rhythm, normal S1 and S2, no S3 or S4, and no murmur noted  Skin: Normal skin color, texture, turgor. No rashes and no jaundice  Neuropsychiatric: Appropriate mood, affect and eye contact. Cooperative.       Primary Care Physician   NETTE HUNTER    Discharge Orders      Reason for your hospital stay    Chest pain--angiogram showed no obstructive coronary artery disease.  Elevated blood pressure, hypertension suspected.     Follow-up and recommended labs and tests     Follow up with primary care provider, NETTE HUNTER, within 7 days to evaluate medication change and for hospital follow- up.  The following labs/tests are recommended: LDL was stable here.  Recommend follow-up BMP with start of antihypertensive medication and further blood pressure medication titration.  Also can discuss when safe to resume ED medication.     Activity    Your activity upon discharge: activity as tolerated     Diet    Follow this diet upon discharge: Orders Placed This Encounter      Regular Diet Adult       Significant Results and Procedures   Most Recent 3 BMP's:  Recent Labs   Lab Test 02/12/24  1755 02/12/24  1517 04/17/23  0911   * 142 144   POTASSIUM 5.1 4.9 4.3   CHLORIDE 110* 106 107   CO2 29 27 26   BUN 18.9 18.4 23.7*   CR 0.93 0.87 0.91   ANIONGAP 7 9 11   SHON 9.5 9.2 9.5   * 184* 93     Most Recent Cholesterol Panel:  Recent Labs   Lab Test 02/13/24  0003   CHOL 117   LDL 58   HDL 49   TRIG 49   ,   Results for orders placed or performed during the hospital encounter of 02/12/24   Chest XR,  PA & LAT    Narrative    EXAM: XR CHEST 2 VIEWS  LOCATION: [facility]  DATE: 2/12/2024    INDICATION: Chest pain on exertion.  COMPARISON:  2024.      Impression    IMPRESSION: Negative chest.   Echocardiogram Complete     Value    LVEF  55-60%    [facility]    [id number]  [id number]  [id number]  [id number]IS[address] [address]     Name: CHRISTINE GALLO  MRN: [id number]  : 1980  Study Date: 2024 08:04 AM  Age: 44 yrs  Gender: Male  Patient Location: [facility]  Reason For Study: Unstable Angina  Ordering Physician: ALLISON SOMERS  Referring Physician: KAIN GOLDSMITH  Performed By: PERRI     BSA: 2.1 m2  Height: 71 in  Weight: 190 lb  HR: 48  BP: 145/89 mmHg  ______________________________________________________________________________  Procedure  Complete Portable Echo Adult.  ______________________________________________________________________________  Interpretation Summary     Left ventricular function is normal.The ejection fraction is 55-60%.  No regional wall motion abnormalities noted.  Normal right ventricle size and systolic function.  The left atrium is moderately dilated.  Aortic root(4.4cm) and ascending(4.0 cm) dilatation is present.  No hemodynamically significant valvular abnormalities on 2D or color flow  imaging.  ______________________________________________________________________________  Left Ventricle  The left ventricle is borderline dilated. Left ventricular function is  normal.The ejection fraction is 55-60%. There is mild eccentric left  ventricular hypertrophy. Left ventricular diastolic function is indeterminate.  No regional wall motion abnormalities noted.     Right Ventricle  Normal right ventricle size and systolic function.     Atria  The left atrium is moderately dilated. Right atrial size is normal. There is  no color Doppler evidence of an atrial shunt.     Mitral Valve  Mitral valve leaflets appear normal. There is no evidence of mitral stenosis  or clinically significant mitral regurgitation. There is trace to mild mitral  regurgitation.     Tricuspid  Valve  Tricuspid valve leaflets appear normal. There is no evidence of tricuspid  stenosis or clinically significant tricuspid regurgitation.     Aortic Valve  Aortic valve leaflets appear normal. There is no evidence of aortic stenosis  or clinically significant aortic regurgitation.     Pulmonic Valve  The pulmonic valve is not well seen, but is grossly normal. This degree of  valvular regurgitation is within normal limits.     Vessels  Aortic root and ascending dilatation is present. IVC diameter and respiratory  changes fall into an intermediate range suggesting an RA pressure of 8 mmHg.     Pericardium  There is no pericardial effusion.     ______________________________________________________________________________  MMode/2D Measurements & Calculations  IVSd: 1.3 cm  LVIDd: 5.5 cm  LVIDs: 3.4 cm  LVPWd: 1.3 cm  FS: 38.9 %  LV mass(C)d: 289.7 grams  LV mass(C)dI: 140.7 grams/m2     Ao root diam: 4.4 cm  asc Aorta Diam: 4.0 cm  LVOT diam: 2.7 cm  LVOT area: 5.7 cm2  Ao root diam index Ht(cm/m): 2.4  Ao root diam index BSA (cm/m2): 2.1  Asc Ao diam index BSA (cm/m2): 1.9  Asc Ao diam index Ht(cm/m): 2.2  LA Volume (BP): 119.0 ml  LA Volume Index (BP): 57.8 ml/m2     LA Volume Indexed (AL/bp): 60.1 ml/m2  RWT: 0.46     Time Measurements  MM HR: 48.0 BPM     Doppler Measurements & Calculations  MV E max krishna: 54.5 cm/sec  MV A max krishna: 75.2 cm/sec  MV E/A: 0.72  MV dec slope: 249.0 cm/sec2  MV dec time: 0.22 sec  Ao V2 max: 158.0 cm/sec  Ao max PG: 10.0 mmHg  Ao V2 mean: 103.0 cm/sec  Ao mean P.0 mmHg  Ao V2 VTI: 35.2 cm  CATHERINE(I,D): 3.9 cm2  CATHERINE(V,D): 3.6 cm2     LV V1 max PG: 3.9 mmHg  LV V1 max: 98.2 cm/sec  LV V1 VTI: 24.2 cm  SV(LVOT): 138.6 ml  SI(LVOT): 67.3 ml/m2  Pulm Sys Krishna: 52.9 cm/sec  Pulm Henderson Krishna: 35.1 cm/sec  Pulm A Revs Krishna: 26.3 cm/sec  Pulm S/D: 1.5  AV Krishna Ratio (DI): 0.62  CATHERINE Index (cm2/m2): 1.9  E/E': 7.0  E/E' av.1  Lateral E/e': 5.1  Medial E/e': 7.0  Peak E' Krishna: 7.8 cm/sec      ______________________________________________________________________________  Report approved by: Jaci Bird 02/13/2024 11:51 AM         Cardiac Catheterization    Narrative    Images from the original result were not included.  Lawson Daniels is a 44 year old old male with HTN, previously high BMI   s/p gastric bypass who is here for CP, mid hs Radha elevation.    - ectatic vessels, no obstructive CAD; normal L-sided filling pressures  - continue ASA 81mg daily indefinitely, consider statin based on FLP   findings  - continue aggressive risk factor modification          Findings:  LM:long vessel, ectatic, no obstruction  LAD:tortuous, no obstruction  Lcx:tortuous no obstruction  RCA:dominant, ectatic, no obstruction    LVEDP:16    Access:  R Radial artery    Closure:   Vasc Band             Discharge Medications   Discharge Medication List as of 2/13/2024  6:23 PM        START taking these medications    Details   aspirin 81 MG EC tablet Take 1 tablet (81 mg) by mouth daily for 30 days, Disp-30 tablet, R-0, E-Prescribe      losartan (COZAAR) 25 MG tablet Take 1 tablet (25 mg) by mouth daily for 30 days, Disp-30 tablet, R-0, E-Prescribe           CONTINUE these medications which have NOT CHANGED    Details   cholecalciferol, vitamin D3, 5,000 unit Tab [CHOLECALCIFEROL, VITAMIN D3, 5,000 UNIT TAB] Take 5,000 Units by mouth daily., Historical      fexofenadine (ALLEGRA) 180 MG tablet [FEXOFENADINE (ALLEGRA) 180 MG TABLET] Take 180 mg by mouth every evening.       , Historical      pediatric multivitamin-iron (POLY-VI-SOL WITH IRON) chewable tablet [PEDIATRIC MULTIVITAMIN-IRON (POLY-VI-SOL WITH IRON) CHEWABLE TABLET] Chew 1 tablet 2 (two) times a day., Historical           STOP taking these medications       sildenafil (VIAGRA) 100 MG tablet Comments:   Reason for Stopping:         tadalafil (ADCIRCA/CIALIS) 20 MG tablet Comments:   Reason for Stopping:             Allergies   Allergies   Allergen  Reactions    Meperidine Nausea and Vomiting    Sumatriptan      Annotation: heartburn, dizziness, upset stomach, would not want to use it again      Tramadol Nausea and Vomiting     Annotation: when broke toe, given tramadol, had nausea and vomiting from that

## 2024-02-14 NOTE — PROGRESS NOTES
Patient alert and oriented.  Denies pain, nausea and shortness of breath.  No bleeding noted at TR band site.  Bandaid intact.  Wrist is soft to the touch.  Discharge teaching and instructions provided to patient.  Covered Post Angio care.  Discharged via self with girlfriend driving.

## 2024-02-14 NOTE — PLAN OF CARE
Pt alert and oriented. Vss on room air. Pt ready to be transferred back to short stay observation unit. Tr band removed without complication. Right radial procedural site cdi. No signs of bleeding/hematoma. Pt ate 100% meal. Sinus parth in 50s on monitor. Pt denies chest pain, pain, sob, n/t. Report given to RN. All belongings remain with patient.

## 2024-02-19 ENCOUNTER — MYC MEDICAL ADVICE (OUTPATIENT)
Dept: FAMILY MEDICINE | Facility: CLINIC | Age: 44
End: 2024-02-19

## 2024-02-19 ENCOUNTER — OFFICE VISIT (OUTPATIENT)
Dept: FAMILY MEDICINE | Facility: CLINIC | Age: 44
End: 2024-02-19
Payer: COMMERCIAL

## 2024-02-19 VITALS
OXYGEN SATURATION: 98 % | WEIGHT: 202.2 LBS | RESPIRATION RATE: 12 BRPM | HEIGHT: 71 IN | DIASTOLIC BLOOD PRESSURE: 82 MMHG | SYSTOLIC BLOOD PRESSURE: 148 MMHG | TEMPERATURE: 99.7 F | BODY MASS INDEX: 28.31 KG/M2 | HEART RATE: 67 BPM

## 2024-02-19 DIAGNOSIS — R79.89 ELEVATED TROPONIN: ICD-10-CM

## 2024-02-19 DIAGNOSIS — I15.9 SECONDARY HYPERTENSION: ICD-10-CM

## 2024-02-19 DIAGNOSIS — R07.9 CHEST PAIN, UNSPECIFIED TYPE: Primary | ICD-10-CM

## 2024-02-19 DIAGNOSIS — Z98.84 S/P GASTRIC BYPASS: ICD-10-CM

## 2024-02-19 DIAGNOSIS — F41.9 ANXIETY: ICD-10-CM

## 2024-02-19 LAB
ANION GAP SERPL CALCULATED.3IONS-SCNC: 9 MMOL/L (ref 7–15)
ATRIAL RATE - MUSE: 49 BPM
BUN SERPL-MCNC: 22 MG/DL (ref 6–20)
CALCIUM SERPL-MCNC: 9.3 MG/DL (ref 8.6–10)
CHLORIDE SERPL-SCNC: 106 MMOL/L (ref 98–107)
CREAT SERPL-MCNC: 0.85 MG/DL (ref 0.67–1.17)
DEPRECATED HCO3 PLAS-SCNC: 28 MMOL/L (ref 22–29)
DIASTOLIC BLOOD PRESSURE - MUSE: NORMAL MMHG
EGFRCR SERPLBLD CKD-EPI 2021: >90 ML/MIN/1.73M2
GLUCOSE SERPL-MCNC: 100 MG/DL (ref 70–99)
INTERPRETATION ECG - MUSE: NORMAL
P AXIS - MUSE: 70 DEGREES
POTASSIUM SERPL-SCNC: 4.7 MMOL/L (ref 3.4–5.3)
PR INTERVAL - MUSE: 140 MS
QRS DURATION - MUSE: 104 MS
QT - MUSE: 456 MS
QTC - MUSE: 411 MS
R AXIS - MUSE: 42 DEGREES
SODIUM SERPL-SCNC: 143 MMOL/L (ref 135–145)
SYSTOLIC BLOOD PRESSURE - MUSE: NORMAL MMHG
T AXIS - MUSE: 46 DEGREES
VENTRICULAR RATE- MUSE: 49 BPM

## 2024-02-19 PROCEDURE — 36415 COLL VENOUS BLD VENIPUNCTURE: CPT | Performed by: FAMILY MEDICINE

## 2024-02-19 PROCEDURE — 80048 BASIC METABOLIC PNL TOTAL CA: CPT | Performed by: FAMILY MEDICINE

## 2024-02-19 PROCEDURE — 99213 OFFICE O/P EST LOW 20 MIN: CPT | Performed by: FAMILY MEDICINE

## 2024-02-19 RX ORDER — ESCITALOPRAM OXALATE 10 MG/1
10 TABLET ORAL DAILY
Qty: 30 TABLET | Refills: 1 | Status: SHIPPED | OUTPATIENT
Start: 2024-02-19 | End: 2024-09-16

## 2024-02-19 ASSESSMENT — PAIN SCALES - GENERAL: PAINLEVEL: NO PAIN (0)

## 2024-02-19 NOTE — PROGRESS NOTES
"  Assessment & Plan   Problem List Items Addressed This Visit       Elevated troponin     Other Visit Diagnoses       Chest pain, unspecified type    -  Primary    S/P gastric bypass        Secondary hypertension        Relevant Orders    Basic metabolic panel (Completed)    Anxiety        Relevant Medications    escitalopram (LEXAPRO) 10 MG tablet             Will start lexapro 10mg once daily.   Follow up one months , sooner if issues     MED REC REQUIRED  Post Medication Reconciliation Status: discharge medications reconciled and changed, per note/orders  BMI  Estimated body mass index is 28.4 kg/m  as calculated from the following:    Height as of this encounter: 1.797 m (5' 10.75\").    Weight as of this encounter: 91.7 kg (202 lb 3.2 oz).             Subjective   Lawson is a 44 year old, presenting for the following health issues:  Medication Update (Med check. Has questions about anxiety medications. Pt is not currently on any medications for anxiety. Pt's therapist is speculating that his recent hospital visit was anxiety driven. She is suggesting buspar. ), Mole (Pt has a couple spots on his back that he would like checked for cancer. ), and Hospital F/U (Pt had chest pain on 2/7. Pt had lots of tests completed at [facility]. Pt has discoloration on Right wrist from the angiogram.)        2/19/2024     9:40 AM   Additional Questions   Roomed by Cassi SCHNEIDER CMA     History of Present Illness       Reason for visit:  Erectile dysfunction    He eats 2-3 servings of fruits and vegetables daily.He consumes 2 sweetened beverage(s) daily.He exercises with enough effort to increase his heart rate 60 or more minutes per day.  He exercises with enough effort to increase his heart rate 5 days per week.   He is taking medications regularly.         Hospital Follow-up Visit:    Hospital/Nursing Home/IP Rehab Facility: [facility]  Date of Admission: 2/12/24  Date of Discharge: 2/13/24  Reason(s) " "for Admission: Chest pain    Was your hospitalization related to COVID-19? No   Problems taking medications regularly:  None  Medication changes since discharge: None  Problems adhering to non-medication therapy:  None    Summary of hospitalization:  [facility] discharge summary reviewed  Diagnostic Tests/Treatments reviewed.  Follow up needed: none  Other Healthcare Providers Involved in Patient s Care:         None  Update since discharge: improved.         Plan of care communicated with patient                   Review of Systems  Constitutional, HEENT, cardiovascular, pulmonary, gi and gu systems are negative, except as otherwise noted.      Objective    BP (!) 148/82 (BP Location: Left arm, Patient Position: Sitting, Cuff Size: Adult Regular)   Pulse 67   Temp 99.7  F (37.6  C) (Oral)   Resp 12   Ht 1.797 m (5' 10.75\")   Wt 91.7 kg (202 lb 3.2 oz)   SpO2 98%   BMI 28.40 kg/m    Body mass index is 28.4 kg/m .  Physical Exam   GENERAL: alert and no distress  NECK: no adenopathy, no asymmetry, masses, or scars  RESP: lungs clear to auscultation - no rales, rhonchi or wheezes  CV: regular rate and rhythm, normal S1 S2, no S3 or S4, no murmur, click or rub, no peripheral edema  ABDOMEN: soft, nontender, no hepatosplenomegaly, no masses and bowel sounds normal  MS: no gross musculoskeletal defects noted, no edema            Signed Electronically by: NETTE HUNTER MD    "

## 2024-02-21 NOTE — TELEPHONE ENCOUNTER
PA Initiation    Medication: TADALAFIL 20 MG PO TABS  Insurance Company: [employer] Non-Specialty PA's - Phone [phone] Fax [phone]  Pharm[facility] Filling the Rx[id number] - [place], [place] - [address] AT [facility] [address]  Filling Pharmacy Phone: [phone]  Filling Pharmacy Fax: [phone]  Start Date: 2/21/2024        Note: Due to record-high volumes, our turn-around time is taking longer than usual . We are currently 10 business days behind in the pools.   We are working diligently to submit all requests in a timely manner and in the order they are received. Please only flag TRUE URGENT requests as high priority to the pool at this time.   If you have questions - please send a note/message in the active PA encounter and send back to the RPPA (Retail Pharmacy Prior Authorization) team [[id number]].    If you have more specific questions about our process please reach out to our supervisor Ashlyn Cantrell.   Thank you!

## 2024-02-23 NOTE — TELEPHONE ENCOUNTER
Prior Authorization Approval    Medication: TADALAFIL 20 MG PO TABS  Authorization Effective Date: 1/22/2024  Authorization Expiration Date: 2/20/2025  Approved Dose/Quantity:         Reference #:     Insurance Company: [employer]ty [employer]'s - Phone [phone] Fax [phone]  Expected CoPay: $    CoPay Card Available:      Financial Assistance Needed:   Which Pharmacy is filling the prescription: [facility] #[id number] [place] [address] AT [facility] [address]  Pharmacy Notified: Y  Patient Notified: Pharmacy will notify patient once order is ready.

## 2024-03-11 ENCOUNTER — MYC MEDICAL ADVICE (OUTPATIENT)
Dept: FAMILY MEDICINE | Facility: CLINIC | Age: 44
End: 2024-03-11
Payer: COMMERCIAL

## 2024-03-16 ENCOUNTER — MYC MEDICAL ADVICE (OUTPATIENT)
Dept: FAMILY MEDICINE | Facility: CLINIC | Age: 44
End: 2024-03-16
Payer: COMMERCIAL

## 2024-03-16 DIAGNOSIS — R07.9 EXERTIONAL CHEST PAIN: ICD-10-CM

## 2024-03-18 ENCOUNTER — PATIENT OUTREACH (OUTPATIENT)
Dept: CARE COORDINATION | Facility: CLINIC | Age: 44
End: 2024-03-18
Payer: COMMERCIAL

## 2024-03-18 DIAGNOSIS — R07.9 EXERTIONAL CHEST PAIN: ICD-10-CM

## 2024-03-18 RX ORDER — LOSARTAN POTASSIUM 25 MG/1
25 TABLET ORAL DAILY
Qty: 30 TABLET | Refills: 0 | Status: SHIPPED | OUTPATIENT
Start: 2024-03-18 | End: 2024-04-01

## 2024-03-18 RX ORDER — LOSARTAN POTASSIUM 25 MG/1
25 TABLET ORAL DAILY
Qty: 90 TABLET | OUTPATIENT
Start: 2024-03-18

## 2024-04-01 ENCOUNTER — OFFICE VISIT (OUTPATIENT)
Dept: FAMILY MEDICINE | Facility: CLINIC | Age: 44
End: 2024-04-01
Payer: COMMERCIAL

## 2024-04-01 ENCOUNTER — PATIENT OUTREACH (OUTPATIENT)
Dept: CARE COORDINATION | Facility: CLINIC | Age: 44
End: 2024-04-01

## 2024-04-01 VITALS
HEART RATE: 50 BPM | TEMPERATURE: 98.3 F | BODY MASS INDEX: 27.92 KG/M2 | DIASTOLIC BLOOD PRESSURE: 84 MMHG | WEIGHT: 199.4 LBS | HEIGHT: 71 IN | SYSTOLIC BLOOD PRESSURE: 153 MMHG | OXYGEN SATURATION: 99 % | RESPIRATION RATE: 16 BRPM

## 2024-04-01 DIAGNOSIS — R07.9 EXERTIONAL CHEST PAIN: ICD-10-CM

## 2024-04-01 DIAGNOSIS — F41.9 ANXIETY: Primary | ICD-10-CM

## 2024-04-01 PROCEDURE — 99213 OFFICE O/P EST LOW 20 MIN: CPT | Performed by: FAMILY MEDICINE

## 2024-04-01 RX ORDER — LOSARTAN POTASSIUM 25 MG/1
25 TABLET ORAL DAILY
Qty: 30 TABLET | Refills: 5 | Status: SHIPPED | OUTPATIENT
Start: 2024-04-01 | End: 2024-10-07

## 2024-04-01 RX ORDER — LORAZEPAM 0.5 MG/1
.5-1 TABLET ORAL EVERY 8 HOURS PRN
Qty: 15 TABLET | Refills: 0 | Status: SHIPPED | OUTPATIENT
Start: 2024-04-01

## 2024-04-01 RX ORDER — ESCITALOPRAM OXALATE 20 MG/1
20 TABLET ORAL DAILY
Qty: 30 TABLET | Refills: 2 | Status: SHIPPED | OUTPATIENT
Start: 2024-04-01 | End: 2024-07-10

## 2024-04-01 ASSESSMENT — ANXIETY QUESTIONNAIRES
3. WORRYING TOO MUCH ABOUT DIFFERENT THINGS: NEARLY EVERY DAY
7. FEELING AFRAID AS IF SOMETHING AWFUL MIGHT HAPPEN: NEARLY EVERY DAY
IF YOU CHECKED OFF ANY PROBLEMS ON THIS QUESTIONNAIRE, HOW DIFFICULT HAVE THESE PROBLEMS MADE IT FOR YOU TO DO YOUR WORK, TAKE CARE OF THINGS AT HOME, OR GET ALONG WITH OTHER PEOPLE: SOMEWHAT DIFFICULT
6. BECOMING EASILY ANNOYED OR IRRITABLE: MORE THAN HALF THE DAYS
5. BEING SO RESTLESS THAT IT IS HARD TO SIT STILL: NEARLY EVERY DAY
7. FEELING AFRAID AS IF SOMETHING AWFUL MIGHT HAPPEN: NEARLY EVERY DAY
1. FEELING NERVOUS, ANXIOUS, OR ON EDGE: NEARLY EVERY DAY
2. NOT BEING ABLE TO STOP OR CONTROL WORRYING: NEARLY EVERY DAY
8. IF YOU CHECKED OFF ANY PROBLEMS, HOW DIFFICULT HAVE THESE MADE IT FOR YOU TO DO YOUR WORK, TAKE CARE OF THINGS AT HOME, OR GET ALONG WITH OTHER PEOPLE?: SOMEWHAT DIFFICULT
4. TROUBLE RELAXING: NEARLY EVERY DAY
GAD7 TOTAL SCORE: 20
GAD7 TOTAL SCORE: 20

## 2024-04-01 ASSESSMENT — PAIN SCALES - GENERAL: PAINLEVEL: NO PAIN (0)

## 2024-04-01 NOTE — PROGRESS NOTES
"  Assessment & Plan   Problem List Items Addressed This Visit       Exertional chest pain    Relevant Medications    losartan (COZAAR) 25 MG tablet     Other Visit Diagnoses       Anxiety    -  Primary    Relevant Medications    escitalopram (LEXAPRO) 20 MG tablet    LORazepam (ATIVAN) 0.5 MG tablet                    BMI  Estimated body mass index is 28.01 kg/m  as calculated from the following:    Height as of this encounter: 1.797 m (5' 10.75\").    Weight as of this encounter: 90.4 kg (199 lb 6.4 oz).       Patient Instructions   Please increase to Lexapro to 20mg once daily  Please use lorazepam 0.5-1mg every 8 hours as needed  Please feel free to update us on all this       Subjective   Lawson is a 44 year old, presenting for the following health issues:  Recheck Medication (Labs  also discuss panic attack last week Sunday through Wednesday evening. See Therapist on Friday 03/29, suggestion to add another medication.)      4/1/2024    10:17 AM   Additional Questions   Roomed by  LPN     History of Present Illness       Mental Health Follow-up:  Patient presents to follow-up on Depression & Anxiety.Patient's depression since last visit has been:  Worse  The patient is not having other symptoms associated with depression.  Patient's anxiety since last visit has been:  Worse  The patient is not having other symptoms associated with anxiety.  Any significant life events: relationship concerns, financial concerns, housing concerns and grief or loss  Patient is feeling anxious or having panic attacks.  Patient has no concerns about alcohol or drug use.    Hypertension: He presents for follow up of hypertension.  He does not check blood pressure  regularly outside of the clinic. Outpatient blood pressures have not been over 140/90. He follows a low salt diet.     Vascular Disease:  He presents for follow up of vascular disease.     He never takes nitroglycerin. He takes daily aspirin.    He eats 2-3 servings of " "fruits and vegetables daily.He consumes 2 sweetened beverage(s) daily.He exercises with enough effort to increase his heart rate 60 or more minutes per day.  He exercises with enough effort to increase his heart rate 7 days per week.   He is taking medications regularly.                 Review of Systems  Constitutional, HEENT, cardiovascular, pulmonary, gi and gu systems are negative, except as otherwise noted.      Objective    BP (!) 153/84 (BP Location: Left arm, Patient Position: Sitting, Cuff Size: Adult Regular)   Pulse 50   Temp 98.3  F (36.8  C) (Oral)   Resp 16   Ht 1.797 m (5' 10.75\")   Wt 90.4 kg (199 lb 6.4 oz)   SpO2 99%   BMI 28.01 kg/m    Body mass index is 28.01 kg/m .  Physical Exam   GENERAL: alert and no distress            Signed Electronically by: NETTE HUNTER MD    "

## 2024-04-01 NOTE — PATIENT INSTRUCTIONS
Please increase to Lexapro to 20mg once daily  Please use lorazepam 0.5-1mg every 8 hours as needed  Please feel free to update us on all this

## 2024-04-08 DIAGNOSIS — F41.9 ANXIETY: ICD-10-CM

## 2024-04-08 RX ORDER — ESCITALOPRAM OXALATE 10 MG/1
10 TABLET ORAL DAILY
Qty: 30 TABLET | Refills: 1 | OUTPATIENT
Start: 2024-04-08

## 2024-06-16 ENCOUNTER — HEALTH MAINTENANCE LETTER (OUTPATIENT)
Age: 44
End: 2024-06-16

## 2024-07-10 DIAGNOSIS — F41.9 ANXIETY: ICD-10-CM

## 2024-07-10 RX ORDER — ESCITALOPRAM OXALATE 20 MG/1
20 TABLET ORAL DAILY
Qty: 30 TABLET | Refills: 0 | Status: SHIPPED | OUTPATIENT
Start: 2024-07-10 | End: 2024-08-13

## 2024-08-13 DIAGNOSIS — F41.9 ANXIETY: ICD-10-CM

## 2024-08-13 RX ORDER — ESCITALOPRAM OXALATE 20 MG/1
20 TABLET ORAL DAILY
Qty: 30 TABLET | Refills: 0 | Status: SHIPPED | OUTPATIENT
Start: 2024-08-13 | End: 2024-10-07

## 2024-09-16 DIAGNOSIS — F41.9 ANXIETY: ICD-10-CM

## 2024-09-16 RX ORDER — ESCITALOPRAM OXALATE 10 MG/1
10 TABLET ORAL DAILY
Qty: 30 TABLET | Refills: 0 | Status: SHIPPED | OUTPATIENT
Start: 2024-09-16

## 2024-10-04 DIAGNOSIS — R07.9 EXERTIONAL CHEST PAIN: ICD-10-CM

## 2024-10-04 DIAGNOSIS — F41.9 ANXIETY: ICD-10-CM

## 2024-10-07 RX ORDER — LOSARTAN POTASSIUM 25 MG/1
25 TABLET ORAL DAILY
Qty: 30 TABLET | Refills: 5 | Status: SHIPPED | OUTPATIENT
Start: 2024-10-07

## 2024-10-07 RX ORDER — ESCITALOPRAM OXALATE 20 MG/1
20 TABLET ORAL DAILY
Qty: 30 TABLET | Refills: 0 | Status: SHIPPED | OUTPATIENT
Start: 2024-10-07 | End: 2024-11-04

## 2024-10-14 ENCOUNTER — PATIENT OUTREACH (OUTPATIENT)
Dept: CARE COORDINATION | Facility: CLINIC | Age: 44
End: 2024-10-14
Payer: COMMERCIAL

## 2024-11-01 ENCOUNTER — OFFICE VISIT (OUTPATIENT)
Dept: FAMILY MEDICINE | Facility: CLINIC | Age: 44
End: 2024-11-01
Payer: COMMERCIAL

## 2024-11-01 VITALS
RESPIRATION RATE: 14 BRPM | WEIGHT: 220 LBS | TEMPERATURE: 99.1 F | DIASTOLIC BLOOD PRESSURE: 74 MMHG | SYSTOLIC BLOOD PRESSURE: 111 MMHG | HEART RATE: 60 BPM | HEIGHT: 71 IN | BODY MASS INDEX: 30.8 KG/M2 | OXYGEN SATURATION: 98 %

## 2024-11-01 DIAGNOSIS — Z11.3 SCREEN FOR STD (SEXUALLY TRANSMITTED DISEASE): Primary | ICD-10-CM

## 2024-11-01 DIAGNOSIS — L73.9 FOLLICULITIS: ICD-10-CM

## 2024-11-01 DIAGNOSIS — R21 RASH: ICD-10-CM

## 2024-11-01 LAB
HSV1 IGG SERPL QL IA: 26.2 INDEX
HSV1 IGG SERPL QL IA: ABNORMAL
HSV2 IGG SERPL QL IA: 0.5 INDEX
HSV2 IGG SERPL QL IA: ABNORMAL
T PALLIDUM AB SER QL: NONREACTIVE

## 2024-11-01 PROCEDURE — 87529 HSV DNA AMP PROBE: CPT | Performed by: PHYSICIAN ASSISTANT

## 2024-11-01 PROCEDURE — 87389 HIV-1 AG W/HIV-1&-2 AB AG IA: CPT | Performed by: PHYSICIAN ASSISTANT

## 2024-11-01 PROCEDURE — 86780 TREPONEMA PALLIDUM: CPT | Performed by: PHYSICIAN ASSISTANT

## 2024-11-01 PROCEDURE — 87591 N.GONORRHOEAE DNA AMP PROB: CPT | Performed by: PHYSICIAN ASSISTANT

## 2024-11-01 PROCEDURE — 99214 OFFICE O/P EST MOD 30 MIN: CPT | Performed by: PHYSICIAN ASSISTANT

## 2024-11-01 PROCEDURE — 87491 CHLMYD TRACH DNA AMP PROBE: CPT | Performed by: PHYSICIAN ASSISTANT

## 2024-11-01 PROCEDURE — 36415 COLL VENOUS BLD VENIPUNCTURE: CPT | Performed by: PHYSICIAN ASSISTANT

## 2024-11-01 PROCEDURE — 86696 HERPES SIMPLEX TYPE 2 TEST: CPT | Performed by: PHYSICIAN ASSISTANT

## 2024-11-01 PROCEDURE — 86695 HERPES SIMPLEX TYPE 1 TEST: CPT | Performed by: PHYSICIAN ASSISTANT

## 2024-11-01 RX ORDER — DOXYCYCLINE 100 MG/1
100 CAPSULE ORAL 2 TIMES DAILY
Qty: 14 CAPSULE | Refills: 0 | Status: SHIPPED | OUTPATIENT
Start: 2024-11-01 | End: 2024-11-08

## 2024-11-01 NOTE — PROGRESS NOTES
"  Assessment & Plan     Screen for STD (sexually transmitted disease)  Rash in pubic area x 6 days has burning, itching and pain associated. Has scabs in pubic hair area and grouped vesicles on penis. Concern for herpes infection vs folliculitis  - NEISSERIA GONORRHOEA PCR  - CHLAMYDIA TRACHOMATIS PCR  - HIV Antigen Antibody Combo  - Treponema Abs w Reflex to RPR and Titer  - Herpes Simplex Virus 1 and 2 IgG  - NEISSERIA GONORRHOEA PCR  - CHLAMYDIA TRACHOMATIS PCR  - HIV Antigen Antibody Combo  - Treponema Abs w Reflex to RPR and Titer  - Herpes Simplex Virus 1 and 2 IgG    Folliculitis  Will treat with doxycycline lesions in pubic region that started after shaving,   - doxycycline hyclate (VIBRAMYCIN) 100 MG capsule  Dispense: 14 capsule; Refill: 0    Rash  Concern for herpes but he is 6 days into symptoms and most lesions are scabbed.  There is one vesicle on penis that was swabbed today.   - HSV Types 1 and 2 Qualitative PCR CSF  - HSV Types 1 and 2 Qualitative PCR CSF            BMI  Estimated body mass index is 30.9 kg/m  as calculated from the following:    Height as of this encounter: 1.797 m (5' 10.75\").    Weight as of this encounter: 99.8 kg (220 lb).             Vazquez Pathak is a 44 year old, presenting for the following health issues:  STD (Sores on and around the penis started 10/27/2024) and Urinary Pain      11/1/2024     8:50 AM   Additional Questions   Roomed by Jabari     STD    History of Present Illness       Reason for visit:  STI or ingrone hair? Pain,itch,and swollen in locations. Took UTI med for 2 days but still persists.  Symptom onset:  3-7 days ago  Symptoms include:  Multiple sores looking like blackheads after shaving. Currently itches,swollen, and is painful.  Symptom intensity:  Moderate  Symptom progression:  Staying the same  Had these symptoms before:  No  What makes it better:  Hot bath soaking   He is taking medications regularly.       Sores in pubic area x6 days.   has " "pain, burning and itching associated. Began as blisters and have now become scabs.  He has shaved his pubic area including penis,  does have a new female sexual partner. Denies n/v/d.  Denies fevers, chills or body aches, has never had this rash before.  He has never had herpes and his partner has never had herpes that she is aware of.       Review of Systems  Constitutional, HEENT, cardiovascular, pulmonary, gi and gu systems are negative, except as otherwise noted.      Objective    /74   Pulse 60   Temp 99.1  F (37.3  C) (Oral)   Resp 14   Ht 1.797 m (5' 10.75\")   Wt 99.8 kg (220 lb)   SpO2 98%   BMI 30.90 kg/m    Body mass index is 30.9 kg/m .  Physical Exam   GENERAL: alert and no distress  EYES: Eyes grossly normal to inspection, PERRL and conjunctivae and sclerae normal   (male) individual scabs on pubic hair area, grouped vesicles on penis.   SKIN: no suspicious lesions or rashes  NEURO: Normal strength and tone, mentation intact and speech normal  PSYCH: mentation appears normal, affect normal/bright            Signed Electronically by: Sugey Christiansen PA-C    "

## 2024-11-02 LAB
C TRACH DNA SPEC QL NAA+PROBE: NEGATIVE
HIV 1+2 AB+HIV1 P24 AG SERPL QL IA: NONREACTIVE
HSV1 DNA SPEC QL NAA+PROBE: DETECTED
HSV2 DNA SPEC QL NAA+PROBE: NOT DETECTED
N GONORRHOEA DNA SPEC QL NAA+PROBE: NEGATIVE

## 2024-11-04 DIAGNOSIS — F41.9 ANXIETY: ICD-10-CM

## 2024-11-04 RX ORDER — ESCITALOPRAM OXALATE 20 MG/1
20 TABLET ORAL DAILY
Qty: 30 TABLET | Refills: 0 | Status: SHIPPED | OUTPATIENT
Start: 2024-11-04

## 2024-11-12 ENCOUNTER — MYC MEDICAL ADVICE (OUTPATIENT)
Dept: FAMILY MEDICINE | Facility: CLINIC | Age: 44
End: 2024-11-12
Payer: COMMERCIAL

## 2024-11-13 ENCOUNTER — VIRTUAL VISIT (OUTPATIENT)
Dept: FAMILY MEDICINE | Facility: CLINIC | Age: 44
End: 2024-11-13
Payer: COMMERCIAL

## 2024-11-13 DIAGNOSIS — N52.9 ERECTILE DYSFUNCTION, UNSPECIFIED ERECTILE DYSFUNCTION TYPE: Primary | ICD-10-CM

## 2024-11-13 PROCEDURE — 99213 OFFICE O/P EST LOW 20 MIN: CPT | Mod: 95 | Performed by: FAMILY MEDICINE

## 2024-11-13 RX ORDER — ALBUTEROL SULFATE 90 UG/1
INHALANT RESPIRATORY (INHALATION)
COMMUNITY

## 2024-11-13 RX ORDER — TADALAFIL 20 MG/1
20 TABLET ORAL DAILY PRN
Qty: 10 TABLET | Refills: 1 | Status: SHIPPED | OUTPATIENT
Start: 2024-11-13

## 2024-11-13 NOTE — PROGRESS NOTES
"Lawson is a 44 year old who is being evaluated via a billable video visit.    How would you like to obtain your AVS? MyChart  If the video visit is dropped, the invitation should be resent by: Text to cell phone: [phone]  Will anyone else be joining your video visit? No  {If patient encounters technical issues they should call [phone] :382849}    {PROVIDER CHARTING PREFERENCE:498873}    Subjective   Lawson is a 44 year old, presenting for the following health issues:  Recheck Medication (MED CHECK REFILL ON MEDICATION FOR ED )        11/13/2024    11:34 AM   Additional Questions   Roomed by LOGAN HARRIS CMA     History of Present Illness       Reason for visit:  STI or ingrone hair? Pain,itch,and swollen in locations. Took UTI med for 2 days but still persists.  Symptom onset:  3-7 days ago  Symptoms include:  Multiple sores looking like blackheads after shaving. Currently itches,swollen, and is painful.  Symptom intensity:  Moderate  Symptom progression:  Staying the same  Had these symptoms before:  No  What makes it better:  Hot bath soaking   He is taking medications regularly.       {SUPERLIST (Optional):193834}  {additonal problems for provider to add (Optional):514735}    {ROS Picklists (Optional):511067}      Objective    Vitals - Patient Reported  Weight (Patient Reported): 99.8 kg (220 lb)  Pulse (Patient Reported): 62        Physical Exam   {video visit exam brief selected:408720}    {Diagnostic Test Results (Optional):892867}      Video-Visit Details    Type of service:  Video Visit   Originating Location (pt. Location): {video visit patient location:940864::\"Home\"}  {PROVIDER LOCATION On-site should be selected for visits conducted from your clinic location or adjoining [facility] hospital, academic office, or other nearby [facility] building. Off-site should be selected for all other provider locations, including home:439100}  Distant Location (provider location):  {virtual location " "provider:332684}  Platform used for Video Visit: {Virtual Visit Platforms:186183::\"[employer]\"}  Signed Electronically by: NETTE HUNTER MD  {Email feedback regarding this note to [email or web address]   :681519}  "

## 2024-12-09 DIAGNOSIS — F41.9 ANXIETY: ICD-10-CM

## 2024-12-09 RX ORDER — ESCITALOPRAM OXALATE 20 MG/1
20 TABLET ORAL DAILY
Qty: 30 TABLET | Refills: 0 | Status: SHIPPED | OUTPATIENT
Start: 2024-12-09

## 2024-12-26 ENCOUNTER — MYC MEDICAL ADVICE (OUTPATIENT)
Dept: FAMILY MEDICINE | Facility: CLINIC | Age: 44
End: 2024-12-26
Payer: COMMERCIAL

## 2024-12-26 NOTE — TELEPHONE ENCOUNTER
Patient would like to stop taking lexapro due to issues it causes with sexual performance. He was recommended to start Wellbutrin, please review if this is appropriate and fill medication if it is.    Jeana Mcintosh RN  [facility]

## 2025-03-11 DIAGNOSIS — F41.9 ANXIETY: ICD-10-CM

## 2025-03-11 RX ORDER — BUPROPION HYDROCHLORIDE 150 MG/1
150 TABLET ORAL EVERY MORNING
Qty: 30 TABLET | Refills: 1 | Status: SHIPPED | OUTPATIENT
Start: 2025-03-11

## 2025-03-25 DIAGNOSIS — N52.9 ERECTILE DYSFUNCTION, UNSPECIFIED ERECTILE DYSFUNCTION TYPE: ICD-10-CM

## 2025-03-25 RX ORDER — TADALAFIL 20 MG/1
20 TABLET ORAL
Qty: 10 TABLET | Refills: 1 | Status: SHIPPED | OUTPATIENT
Start: 2025-03-25

## 2025-05-19 ENCOUNTER — OFFICE VISIT (OUTPATIENT)
Dept: URGENT CARE | Facility: URGENT CARE | Age: 45
End: 2025-05-19
Payer: COMMERCIAL

## 2025-05-19 VITALS
HEIGHT: 71 IN | TEMPERATURE: 97.6 F | HEART RATE: 46 BPM | BODY MASS INDEX: 30.8 KG/M2 | DIASTOLIC BLOOD PRESSURE: 86 MMHG | WEIGHT: 220 LBS | OXYGEN SATURATION: 100 % | SYSTOLIC BLOOD PRESSURE: 147 MMHG | RESPIRATION RATE: 18 BRPM

## 2025-05-19 DIAGNOSIS — H61.22 IMPACTED CERUMEN OF LEFT EAR: ICD-10-CM

## 2025-05-19 DIAGNOSIS — J01.10 ACUTE NON-RECURRENT FRONTAL SINUSITIS: Primary | ICD-10-CM

## 2025-05-19 RX ORDER — FLUTICASONE PROPIONATE 50 MCG
2 SPRAY, SUSPENSION (ML) NASAL DAILY
Qty: 18 ML | Refills: 0 | Status: SHIPPED | OUTPATIENT
Start: 2025-05-19 | End: 2025-06-09

## 2025-05-19 NOTE — PROGRESS NOTES
"Chief Complaint   Patient presents with    Sinus Problem     headache for last week, sinus pressure, secretions from right ear         Assessment & Plan  Assessment  - I suspect patient's symptoms are more consistent with allergy or inflammation related sinusitis versus bacterial infection.  However he does run risk of developing this.  Initially we will treat with increasing Allegra to twice a day, Flonase and Otovent.  If symptoms are not improving over the next week or experiencing worsening systemic symptoms start Augmentin.      ICD-10-CM    1. Acute non-recurrent frontal sinusitis  J01.10 fluticasone (FLONASE) 50 MCG/ACT nasal spray     amoxicillin-clavulanate (AUGMENTIN) 875-125 MG tablet          SUBJECTIVE:  History of Present Illness-Lawson Daniels, a 45-year-old male, reports experiencing a persistent headache for the past week, which he associates with concerns for either a sinus or ear infection. He describes constant nasal drainage and mentions that his girlfriend observed drainage from his right ear. He has been experiencing daily headaches since last week, which have significantly disrupted his sleep over the weekend. He reports having chills but no fever, cough, or body aches. He frequently sneezes and takes Allegra daily, especially during spring and fall, due to severe allergies. He denies having a sore throat.  He describes a constant pressure behind his eyes and nasal congestion but denies any new ringing  in the ears or hearing loss. He underwent bariatric surgery in 2018 and is not supposed to take ibuprofen; he takes acetaminophen, which only slightly dulls the headache pain. Despite the headache, he was able to complete a 13-mile run the day before the visit.     ROS: Pertinent ROS neg other than the symptoms noted above in the HPI.     OBJECTIVE:  BP (!) 147/86   Pulse (!) 46   Temp 97.6  F (36.4  C) (Oral)   Resp 18   Ht 1.803 m (5' 11\")   Wt 99.8 kg (220 lb)   SpO2 100%   BMI " 30.68 kg/m        GENERAL: alert and no distress  EYES: Eyes grossly normal to inspection, PERRL and conjunctivae and sclerae normal  HENT: TM impacted with cerumen: After removal left TM within normal limits, right TM mild clear effusion.  Nasal mucosal edema, nose and mouth without ulcers or lesions  MS: no gross musculoskeletal defects noted, no edema  SKIN: no suspicious lesions or rashes  NEURO: Normal strength and tone, mentation intact and speech normal, cranial nerves II through XII intact, finger-to-nose normal, Romberg negative    DIAGNOSTICS       No results found for any visits on 05/19/25.     Artie Torres PA-C    Consent was obtained from the patient to use an AI documentation tool in the creation of this note.  Any grammatical or spelling errors are non-intentional. Please contact the author of this note directly if you are in need of any clarification.     Current Outpatient Medications   Medication Sig Dispense Refill    amoxicillin-clavulanate (AUGMENTIN) 875-125 MG tablet Take 1 tablet by mouth 2 times daily for 7 days. 14 tablet 0    fluticasone (FLONASE) 50 MCG/ACT nasal spray Spray 2 sprays into both nostrils daily for 21 days. 18 mL 0    albuterol (PROAIR HFA/PROVENTIL HFA/VENTOLIN HFA) 108 (90 Base) MCG/ACT inhaler INHALE 2 PUFFS BY MOUTH EVERY 4 HOURS AS NEEDED FOR WHEEZING AND FOR COUGH -USE WITH EXTENDER (Patient not taking: Reported on 11/13/2024)      buPROPion (WELLBUTRIN XL) 150 MG 24 hr tablet TAKE 1 TABLET(150 MG) BY MOUTH EVERY MORNING 30 tablet 1    cholecalciferol, vitamin D3, 5,000 unit Tab [CHOLECALCIFEROL, VITAMIN D3, 5,000 UNIT TAB] Take 5,000 Units by mouth daily.      fexofenadine (ALLEGRA) 180 MG tablet [FEXOFENADINE (ALLEGRA) 180 MG TABLET] Take 180 mg by mouth every evening.             LORazepam (ATIVAN) 0.5 MG tablet Take 1-2 tablets (0.5-1 mg) by mouth every 8 hours as needed for anxiety 15 tablet 0    losartan (COZAAR) 25 MG tablet TAKE 1 TABLET(25 MG) BY MOUTH  DAILY 30 tablet 5    pediatric multivitamin-iron (POLY-VI-SOL WITH IRON) chewable tablet [PEDIATRIC MULTIVITAMIN-IRON (POLY-VI-SOL WITH IRON) CHEWABLE TABLET] Chew 1 tablet 2 (two) times a day.      tadalafil (CIALIS) 20 MG tablet TAKE 1 TABLET(20 MG) BY MOUTH DAILY AS NEEDED 10 tablet 1     No current facility-administered medications for this visit.      Patient Active Problem List   Diagnosis    Flat feet, bilateral    Allergic rhinitis    PAC (premature atrial contraction)    Edema    Venous hypertension of lower extremity, bilateral    Valgus deformity of both feet    Snoring    Venous insufficiency of both lower extremities    Sleep apnea    Elevated troponin    Exertional chest pain      No past medical history on file.  Past Surgical History:   Procedure Laterality Date    ABDOMEN SURGERY  March 2019    Bariatric (Gastric Sleeve) Surgery    CHOLECYSTECTOMY  05/01/2006    stones.    CV CORONARY ANGIOGRAM N/A 2/13/2024    Procedure: Coronary Angiogram;  Surgeon: Maximo La MD;  [facility] CATH LAB CV    CV LEFT HEART CATH N/A 2/13/2024    Procedure: Left Heart Catheterization;  Surgeon: Maximo La MD;  Location: [facility] CATH LAB CV    LAPAROSCOPIC CHOLECYSTECTOMY  05/01/2006    [facility]    LASIK Bilateral 01/01/2015    MN LAP, KRISTEN RESTRICT PROC, LONGITUDINAL GASTRECTOMY N/A 05/13/2019    Procedure: LAPAROSCOPIC SLEEVE GASTRECTOMY;  Surgeon: Marcos Hernández MD;  Location: [facility];  Service: General    VASECTOMY  04/30/2010    Dr Pickering at [facility]     Family History   Problem Relation Age of Onset    Breast Cancer Mother     Myasthenia gravis Father     Snoring Father     Dementia Father     Breast Cancer Maternal Grandmother     ALS Paternal Grandmother     Asthma Daughter     No Known Problems Son      Social History     Tobacco Use    Smoking status: Former     Current packs/day: 0.00     Average packs/day: 2.0 packs/day for 15.0 years  (30.0 ttl pk-yrs)     Types: Cigarettes     Start date: 1995     Quit date: 2006     Years since quittin.7     Passive exposure: Never    Smokeless tobacco: Former     Types: Chew    Tobacco comments:     No   Substance Use Topics    Alcohol use: Not Currently     Comment: Sober since 23

## 2025-05-19 NOTE — PROGRESS NOTES
Urgent Care Clinic Visit    Chief Complaint   Patient presents with    Sinus Problem     headache for last week, sinus pressure, secretions from right ear

## 2025-05-19 NOTE — PATIENT INSTRUCTIONS
The vast majority of sinus infections are caused by viruses and improve after 12-14 days. This means antibiotics will not help your symptoms and possible give you undesirable side effects like diarrhea and upset stomach.    First, improve your sinus hygiene by:  Flonase/fluticasone nasal spray 2 sprays in each nostril once a day for 1 - 4 weeks.  This may take several days to become effective.   Consider saline nasal rinses  Otovent, which is a balloon you blow up with your nose and helps pop the ears and regulate the pressure you feel can be bought off of [employer] and works quite well  Mucinex may be helpful  Vicks VapoRub  Humidified air and steam  These can be bought Over the Counter at any Pharmacy.    If your symptoms are not improving or worsening after 12-14 days since symptom onset and 5-7 days of trying the above then you can get and try the Antibiotic prescribed because the sinus infection is likely to be bacterial at that point.

## 2025-06-21 ENCOUNTER — HEALTH MAINTENANCE LETTER (OUTPATIENT)
Age: 45
End: 2025-06-21

## 2025-06-24 DIAGNOSIS — R07.9 EXERTIONAL CHEST PAIN: ICD-10-CM

## 2025-06-24 DIAGNOSIS — F41.9 ANXIETY: ICD-10-CM

## 2025-06-24 NOTE — TELEPHONE ENCOUNTER
Requested Prescriptions   Pending Prescriptions Disp Refills    losartan (COZAAR) 25 MG tablet [Pharmacy Med Name: LOSARTAN 25MG TABLETS] 30 tablet 5     Sig: TAKE 1 TABLET(25 MG) BY MOUTH DAILY       Angiotensin-II Receptors Failed - 6/24/2025  7:34 AM        Failed - Most recent blood pressure under 140/90 in past 12 months     BP Readings from Last 3 Encounters:   05/19/25 (!) 147/86   11/01/24 111/74   04/01/24 (!) 153/84       No data recorded            Failed - Has GFR on file in past 12 months and most recent value is normal        Failed - Medication indicated for associated diagnosis     The medication is prescribed for one or more of the following conditions:    Chronic Kidney Disease (CDK)    Heart Failure (HF)    Diabetes, Nephropathy   Hypertension    Coronary Artery Disease (CAD)   Raynaud's Disease   Ischemic cardiomyopathy   Cardiomyopathy   Pulmonary Hypertension          Failed - Normal serum potassium on file in past 12 months     Recent Labs   Lab Test 02/19/24  1032   POTASSIUM 4.7                    Passed - Medication is active on med list and the sig matches. RN to manually verify dose and sig if red X/fail.     If the protocol passes (green check), you do not need to verify med dose and sig.    A prescription matches if they are the same clinical intention.    For Example: once daily and every morning are the same.    The protocol can not identify upper and lower case letters as matching and will fail.     For Example: Take 1 tablet (50 mg) by mouth daily     TAKE 1 TABLET (50 MG) BY MOUTH DAILY    For all fails (red x), verify dose and sig.    If the refill does match what is on file, the RN can still proceed to approve the refill request.       If they do not match, route to the appropriate provider.             Passed - Recent (12 month) or future (90 days) visit with authorizing provider's specialty (provided they have been seen in the past 15 months)     The patient must have completed  an in-person or virtual visit within the past 12 months or has a future visit scheduled within the next 90 days with the authorizing provider s specialty.  Urgent care and e-visits do not qualify as an office visit for this protocol.          Passed - Patient is age 18 or older          buPROPion (WELLBUTRIN XL) 150 MG 24 hr tablet [Pharmacy Med Name: BUPROPION XL 150MG TABLETS (24 H)] 30 tablet 1     Sig: TAKE 1 TABLET(150 MG) BY MOUTH EVERY MORNING       Rx Protocol Bupropion Passed - 6/24/2025  7:34 AM        Passed - Medication is active on med list and the sig matches. RN to manually verify dose and sig if red X/fail.     If the protocol passes (green check), you do not need to verify med dose and sig.    A prescription matches if they are the same clinical intention.    For Example: once daily and every morning are the same.    The protocol can not identify upper and lower case letters as matching and will fail.     For Example: Take 1 tablet (50 mg) by mouth daily     TAKE 1 TABLET (50 MG) BY MOUTH DAILY    For all fails (red x), verify dose and sig.    If the refill does match what is on file, the RN can still proceed to approve the refill request.       If they do not match, route to the appropriate provider.             Passed - Recent (12 month) or future (90 days) visit with authorizing provider's specialty (provided they have been seen in the past 15 months)     The patient must have completed an in-person or virtual visit within the past 12 months or has a future visit scheduled within the next 90 days with the authorizing provider s specialty.  Urgent care and e-visits do not qualify as an office visit for this protocol.          Passed - Medication is indicated for associated diagnosis     Medication is associated with one or more of the following diagnoses:  Anxiety  Bipolar Disorder  Depression  Smoking Cessation  Adjustment disorder with mixed anxiety and depressed mood  Adjustment disorder with  anxiety  Tobacco user  Adjustment disorder with anxious mood  Attention deficit hyperactivity disorder          Passed - Blood pressure on file in the past 12 months        Passed - RAMANDEEP-7 on file in the past 12 months     RAMANDEEP-7 is only required if the requested medication is prescribed for anxiety.          Passed - Patient is age 18 or older

## 2025-06-25 RX ORDER — LOSARTAN POTASSIUM 25 MG/1
25 TABLET ORAL DAILY
Qty: 30 TABLET | Refills: 5 | Status: SHIPPED | OUTPATIENT
Start: 2025-06-25

## 2025-06-25 RX ORDER — BUPROPION HYDROCHLORIDE 150 MG/1
150 TABLET ORAL EVERY MORNING
Qty: 30 TABLET | Refills: 1 | Status: SHIPPED | OUTPATIENT
Start: 2025-06-25

## 2025-08-18 DIAGNOSIS — J30.9 ALLERGIC RHINITIS, UNSPECIFIED SEASONALITY, UNSPECIFIED TRIGGER: Primary | Chronic | ICD-10-CM

## 2025-08-19 RX ORDER — FLUTICASONE PROPIONATE 50 MCG
1 SPRAY, SUSPENSION (ML) NASAL DAILY
Qty: 16 G | Refills: 1 | Status: SHIPPED | OUTPATIENT
Start: 2025-08-19

## (undated) DEVICE — KIT HAND CONTROL ACIST 014644 AR-P54

## (undated) DEVICE — ELECTRODE DEFIB CADENCE 22550R

## (undated) DEVICE — MANIFOLD KIT ANGIO AUTOMATED 014613

## (undated) DEVICE — CATH DIAG RADIAL 5FR TIG 4.5 100CM

## (undated) DEVICE — SHTH INTRO 0.021IN ID 6FR DIA

## (undated) DEVICE — SYR ANGIOGRAPHY MULTIUSE KIT ACIST 014612

## (undated) DEVICE — SLEEVE TR BAND RADIAL COMPRESSION DEVICE 24CM TRB24-REG

## (undated) DEVICE — CUSTOM PACK CORONARY SAN5BCRHEA

## (undated) RX ORDER — DIAZEPAM 10 MG
TABLET ORAL
Status: DISPENSED
Start: 2024-02-13

## (undated) RX ORDER — FENTANYL CITRATE 50 UG/ML
INJECTION, SOLUTION INTRAMUSCULAR; INTRAVENOUS
Status: DISPENSED
Start: 2024-02-13

## (undated) RX ORDER — ASPIRIN 325 MG
TABLET ORAL
Status: DISPENSED
Start: 2024-02-13